# Patient Record
Sex: MALE | Race: WHITE | NOT HISPANIC OR LATINO | Employment: FULL TIME | URBAN - NONMETROPOLITAN AREA
[De-identification: names, ages, dates, MRNs, and addresses within clinical notes are randomized per-mention and may not be internally consistent; named-entity substitution may affect disease eponyms.]

---

## 2018-10-11 ENCOUNTER — HOSPITAL ENCOUNTER (OUTPATIENT)
Facility: HOSPITAL | Age: 43
Discharge: HOME | End: 2018-10-12
Attending: EMERGENCY MEDICINE | Admitting: UROLOGY

## 2018-10-11 ENCOUNTER — APPOINTMENT (OUTPATIENT)
Dept: CT IMAGING | Facility: HOSPITAL | Age: 43
End: 2018-10-11

## 2018-10-11 DIAGNOSIS — N20.1 RIGHT URETERAL STONE: Primary | ICD-10-CM

## 2018-10-11 DIAGNOSIS — N23 RENAL COLIC ON RIGHT SIDE: ICD-10-CM

## 2018-10-11 PROBLEM — R31.9 HEMATURIA: Status: ACTIVE | Noted: 2018-10-11

## 2018-10-11 LAB
ALBUMIN SERPL-MCNC: 4.5 G/DL (ref 3.5–5)
ALBUMIN/GLOB SERPL: 1.5 G/DL (ref 1.5–2.5)
ALP SERPL-CCNC: 76 U/L (ref 40–129)
ALT SERPL W P-5'-P-CCNC: 38 U/L (ref 10–44)
ANION GAP SERPL CALCULATED.3IONS-SCNC: 7.9 MMOL/L (ref 3.6–11.2)
AST SERPL-CCNC: 30 U/L (ref 10–34)
BACTERIA UR QL AUTO: ABNORMAL /HPF
BACTERIA UR QL AUTO: ABNORMAL /HPF
BASOPHILS # BLD AUTO: 0.05 10*3/MM3 (ref 0–0.3)
BASOPHILS NFR BLD AUTO: 0.6 % (ref 0–2)
BILIRUB SERPL-MCNC: 1.5 MG/DL (ref 0.2–1.8)
BILIRUB UR QL STRIP: ABNORMAL
BILIRUB UR QL STRIP: NEGATIVE
BUN BLD-MCNC: 16 MG/DL (ref 7–21)
BUN/CREAT SERPL: 16.5 (ref 7–25)
CALCIUM SPEC-SCNC: 9.2 MG/DL (ref 7.7–10)
CHLORIDE SERPL-SCNC: 106 MMOL/L (ref 99–112)
CLARITY UR: ABNORMAL
CLARITY UR: CLEAR
CO2 SERPL-SCNC: 27.1 MMOL/L (ref 24.3–31.9)
COLOR UR: ABNORMAL
COLOR UR: YELLOW
CREAT BLD-MCNC: 0.97 MG/DL (ref 0.43–1.29)
CRP SERPL-MCNC: <0.5 MG/DL (ref 0–0.99)
CRP SERPL-MCNC: <0.5 MG/DL (ref 0–0.99)
DEPRECATED RDW RBC AUTO: 38.8 FL (ref 37–54)
EOSINOPHIL # BLD AUTO: 0.2 10*3/MM3 (ref 0–0.7)
EOSINOPHIL NFR BLD AUTO: 2.4 % (ref 0–5)
ERYTHROCYTE [DISTWIDTH] IN BLOOD BY AUTOMATED COUNT: 12.7 % (ref 11.5–14.5)
GFR SERPL CREATININE-BSD FRML MDRD: 85 ML/MIN/1.73
GLOBULIN UR ELPH-MCNC: 3.1 GM/DL
GLUCOSE BLD-MCNC: 98 MG/DL (ref 70–110)
GLUCOSE UR STRIP-MCNC: NEGATIVE MG/DL
GLUCOSE UR STRIP-MCNC: NEGATIVE MG/DL
HCT VFR BLD AUTO: 44.8 % (ref 42–52)
HGB BLD-MCNC: 15.9 G/DL (ref 14–18)
HGB UR QL STRIP.AUTO: ABNORMAL
HGB UR QL STRIP.AUTO: ABNORMAL
HOLD SPECIMEN: NORMAL
HYALINE CASTS UR QL AUTO: ABNORMAL /LPF
HYALINE CASTS UR QL AUTO: ABNORMAL /LPF
IMM GRANULOCYTES # BLD: 0.04 10*3/MM3 (ref 0–0.03)
IMM GRANULOCYTES NFR BLD: 0.5 % (ref 0–0.5)
KETONES UR QL STRIP: ABNORMAL
KETONES UR QL STRIP: NEGATIVE
LEUKOCYTE ESTERASE UR QL STRIP.AUTO: ABNORMAL
LEUKOCYTE ESTERASE UR QL STRIP.AUTO: NEGATIVE
LYMPHOCYTES # BLD AUTO: 3.21 10*3/MM3 (ref 1–3)
LYMPHOCYTES NFR BLD AUTO: 38.4 % (ref 21–51)
MCH RBC QN AUTO: 29.8 PG (ref 27–33)
MCHC RBC AUTO-ENTMCNC: 35.5 G/DL (ref 33–37)
MCV RBC AUTO: 83.9 FL (ref 80–94)
MONOCYTES # BLD AUTO: 0.72 10*3/MM3 (ref 0.1–0.9)
MONOCYTES NFR BLD AUTO: 8.6 % (ref 0–10)
MUCOUS THREADS URNS QL MICRO: ABNORMAL /HPF
NEUTROPHILS # BLD AUTO: 4.13 10*3/MM3 (ref 1.4–6.5)
NEUTROPHILS NFR BLD AUTO: 49.5 % (ref 30–70)
NITRITE UR QL STRIP: NEGATIVE
NITRITE UR QL STRIP: NEGATIVE
OSMOLALITY SERPL CALC.SUM OF ELEC: 282.4 MOSM/KG (ref 273–305)
PH UR STRIP.AUTO: 6 [PH] (ref 5–8)
PH UR STRIP.AUTO: 6 [PH] (ref 5–8)
PLATELET # BLD AUTO: 356 10*3/MM3 (ref 130–400)
PMV BLD AUTO: 10.2 FL (ref 6–10)
POTASSIUM BLD-SCNC: 4.2 MMOL/L (ref 3.5–5.3)
PROT SERPL-MCNC: 7.6 G/DL (ref 6–8)
PROT UR QL STRIP: ABNORMAL
PROT UR QL STRIP: NEGATIVE
RBC # BLD AUTO: 5.34 10*6/MM3 (ref 4.7–6.1)
RBC # UR: ABNORMAL /HPF
RBC # UR: ABNORMAL /HPF
REF LAB TEST METHOD: ABNORMAL
REF LAB TEST METHOD: ABNORMAL
SODIUM BLD-SCNC: 141 MMOL/L (ref 135–153)
SP GR UR STRIP: 1.01 (ref 1–1.03)
SP GR UR STRIP: 1.02 (ref 1–1.03)
SQUAMOUS #/AREA URNS HPF: ABNORMAL /HPF
SQUAMOUS #/AREA URNS HPF: ABNORMAL /HPF
UROBILINOGEN UR QL STRIP: ABNORMAL
UROBILINOGEN UR QL STRIP: ABNORMAL
WBC NRBC COR # BLD: 8.35 10*3/MM3 (ref 4.5–12.5)
WBC UR QL AUTO: ABNORMAL /HPF
WBC UR QL AUTO: ABNORMAL /HPF
WHOLE BLOOD HOLD SPECIMEN: NORMAL

## 2018-10-11 PROCEDURE — G0378 HOSPITAL OBSERVATION PER HR: HCPCS

## 2018-10-11 PROCEDURE — 81001 URINALYSIS AUTO W/SCOPE: CPT | Performed by: PHYSICIAN ASSISTANT

## 2018-10-11 PROCEDURE — 25010000002 MORPHINE PER 10 MG: Performed by: INTERNAL MEDICINE

## 2018-10-11 PROCEDURE — 80053 COMPREHEN METABOLIC PANEL: CPT | Performed by: PHYSICIAN ASSISTANT

## 2018-10-11 PROCEDURE — 25010000002 KETOROLAC TROMETHAMINE PER 15 MG: Performed by: INTERNAL MEDICINE

## 2018-10-11 PROCEDURE — 74176 CT ABD & PELVIS W/O CONTRAST: CPT

## 2018-10-11 PROCEDURE — 81001 URINALYSIS AUTO W/SCOPE: CPT | Performed by: NURSE PRACTITIONER

## 2018-10-11 PROCEDURE — 96361 HYDRATE IV INFUSION ADD-ON: CPT

## 2018-10-11 PROCEDURE — 86140 C-REACTIVE PROTEIN: CPT | Performed by: NURSE PRACTITIONER

## 2018-10-11 PROCEDURE — 25010000002 PROMETHAZINE PER 50 MG: Performed by: PHYSICIAN ASSISTANT

## 2018-10-11 PROCEDURE — 25010000002 MORPHINE PER 10 MG

## 2018-10-11 PROCEDURE — 96376 TX/PRO/DX INJ SAME DRUG ADON: CPT

## 2018-10-11 PROCEDURE — 25010000002 HYDROMORPHONE 1 MG/ML SOLUTION: Performed by: EMERGENCY MEDICINE

## 2018-10-11 PROCEDURE — 85025 COMPLETE CBC W/AUTO DIFF WBC: CPT | Performed by: PHYSICIAN ASSISTANT

## 2018-10-11 PROCEDURE — 74176 CT ABD & PELVIS W/O CONTRAST: CPT | Performed by: RADIOLOGY

## 2018-10-11 PROCEDURE — 96374 THER/PROPH/DIAG INJ IV PUSH: CPT

## 2018-10-11 PROCEDURE — 25010000002 ONDANSETRON PER 1 MG: Performed by: PHYSICIAN ASSISTANT

## 2018-10-11 PROCEDURE — 25010000002 ONDANSETRON PER 1 MG: Performed by: INTERNAL MEDICINE

## 2018-10-11 PROCEDURE — 25010000002 KETOROLAC TROMETHAMINE PER 15 MG: Performed by: PHYSICIAN ASSISTANT

## 2018-10-11 PROCEDURE — 96375 TX/PRO/DX INJ NEW DRUG ADDON: CPT

## 2018-10-11 PROCEDURE — 86140 C-REACTIVE PROTEIN: CPT | Performed by: INTERNAL MEDICINE

## 2018-10-11 PROCEDURE — 99284 EMERGENCY DEPT VISIT MOD MDM: CPT

## 2018-10-11 RX ORDER — MULTIVITAMIN
1 TABLET ORAL DAILY
Status: DISCONTINUED | OUTPATIENT
Start: 2018-10-12 | End: 2018-10-12 | Stop reason: HOSPADM

## 2018-10-11 RX ORDER — KETOROLAC TROMETHAMINE 30 MG/ML
30 INJECTION, SOLUTION INTRAMUSCULAR; INTRAVENOUS EVERY 6 HOURS PRN
Status: DISCONTINUED | OUTPATIENT
Start: 2018-10-11 | End: 2018-10-12 | Stop reason: HOSPADM

## 2018-10-11 RX ORDER — MORPHINE SULFATE 2 MG/ML
4 INJECTION, SOLUTION INTRAMUSCULAR; INTRAVENOUS ONCE
Status: COMPLETED | OUTPATIENT
Start: 2018-10-11 | End: 2018-10-11

## 2018-10-11 RX ORDER — ATENOLOL 50 MG/1
50 TABLET ORAL DAILY
Status: DISCONTINUED | OUTPATIENT
Start: 2018-10-12 | End: 2018-10-12 | Stop reason: HOSPADM

## 2018-10-11 RX ORDER — SODIUM CHLORIDE 9 MG/ML
INJECTION, SOLUTION INTRAVENOUS
Status: DISPENSED
Start: 2018-10-11 | End: 2018-10-11

## 2018-10-11 RX ORDER — SODIUM CHLORIDE 0.9 % (FLUSH) 0.9 %
3 SYRINGE (ML) INJECTION EVERY 12 HOURS SCHEDULED
Status: DISCONTINUED | OUTPATIENT
Start: 2018-10-11 | End: 2018-10-12 | Stop reason: HOSPADM

## 2018-10-11 RX ORDER — NALOXONE HCL 0.4 MG/ML
0.4 VIAL (ML) INJECTION
Status: DISCONTINUED | OUTPATIENT
Start: 2018-10-11 | End: 2018-10-11

## 2018-10-11 RX ORDER — MORPHINE SULFATE 2 MG/ML
INJECTION, SOLUTION INTRAMUSCULAR; INTRAVENOUS
Status: COMPLETED
Start: 2018-10-11 | End: 2018-10-11

## 2018-10-11 RX ORDER — MULTIVITAMIN
1 TABLET ORAL DAILY
Status: CANCELLED | OUTPATIENT
Start: 2018-10-11

## 2018-10-11 RX ORDER — SODIUM CHLORIDE 0.9 % (FLUSH) 0.9 %
10 SYRINGE (ML) INJECTION AS NEEDED
Status: DISCONTINUED | OUTPATIENT
Start: 2018-10-11 | End: 2018-10-12 | Stop reason: HOSPADM

## 2018-10-11 RX ORDER — ATENOLOL 50 MG/1
50 TABLET ORAL DAILY
Status: CANCELLED | OUTPATIENT
Start: 2018-10-11

## 2018-10-11 RX ORDER — KETOROLAC TROMETHAMINE 30 MG/ML
30 INJECTION, SOLUTION INTRAMUSCULAR; INTRAVENOUS ONCE
Status: COMPLETED | OUTPATIENT
Start: 2018-10-11 | End: 2018-10-11

## 2018-10-11 RX ORDER — PROMETHAZINE HYDROCHLORIDE 25 MG/ML
12.5 INJECTION, SOLUTION INTRAMUSCULAR; INTRAVENOUS ONCE
Status: COMPLETED | OUTPATIENT
Start: 2018-10-11 | End: 2018-10-11

## 2018-10-11 RX ORDER — ONDANSETRON 2 MG/ML
4 INJECTION INTRAMUSCULAR; INTRAVENOUS ONCE
Status: COMPLETED | OUTPATIENT
Start: 2018-10-11 | End: 2018-10-11

## 2018-10-11 RX ORDER — MORPHINE SULFATE 2 MG/ML
2 INJECTION, SOLUTION INTRAMUSCULAR; INTRAVENOUS EVERY 4 HOURS PRN
Status: DISCONTINUED | OUTPATIENT
Start: 2018-10-11 | End: 2018-10-12 | Stop reason: HOSPADM

## 2018-10-11 RX ORDER — MORPHINE SULFATE 2 MG/ML
1 INJECTION, SOLUTION INTRAMUSCULAR; INTRAVENOUS EVERY 4 HOURS PRN
Status: DISCONTINUED | OUTPATIENT
Start: 2018-10-11 | End: 2018-10-11

## 2018-10-11 RX ORDER — MULTIVITAMIN
1 TABLET ORAL DAILY
COMMUNITY
End: 2022-08-30

## 2018-10-11 RX ORDER — ONDANSETRON 2 MG/ML
4 INJECTION INTRAMUSCULAR; INTRAVENOUS EVERY 6 HOURS PRN
Status: DISCONTINUED | OUTPATIENT
Start: 2018-10-11 | End: 2018-10-12 | Stop reason: HOSPADM

## 2018-10-11 RX ORDER — NALOXONE HCL 0.4 MG/ML
0.4 VIAL (ML) INJECTION
Status: DISCONTINUED | OUTPATIENT
Start: 2018-10-11 | End: 2018-10-12 | Stop reason: HOSPADM

## 2018-10-11 RX ORDER — ACETAMINOPHEN 325 MG/1
650 TABLET ORAL EVERY 4 HOURS PRN
Status: DISCONTINUED | OUTPATIENT
Start: 2018-10-11 | End: 2018-10-12 | Stop reason: HOSPADM

## 2018-10-11 RX ORDER — SODIUM CHLORIDE 0.9 % (FLUSH) 0.9 %
3-10 SYRINGE (ML) INJECTION AS NEEDED
Status: DISCONTINUED | OUTPATIENT
Start: 2018-10-11 | End: 2018-10-12 | Stop reason: HOSPADM

## 2018-10-11 RX ADMIN — HYDROMORPHONE HYDROCHLORIDE 1 MG: 1 INJECTION, SOLUTION INTRAMUSCULAR; INTRAVENOUS; SUBCUTANEOUS at 07:19

## 2018-10-11 RX ADMIN — ONDANSETRON 4 MG: 2 INJECTION, SOLUTION INTRAMUSCULAR; INTRAVENOUS at 06:22

## 2018-10-11 RX ADMIN — MORPHINE SULFATE 1 MG: 2 INJECTION, SOLUTION INTRAMUSCULAR; INTRAVENOUS at 18:39

## 2018-10-11 RX ADMIN — KETOROLAC TROMETHAMINE 30 MG: 30 INJECTION, SOLUTION INTRAMUSCULAR; INTRAVENOUS at 23:33

## 2018-10-11 RX ADMIN — MORPHINE SULFATE 4 MG: 2 INJECTION, SOLUTION INTRAMUSCULAR; INTRAVENOUS at 06:34

## 2018-10-11 RX ADMIN — MORPHINE SULFATE 2 MG: 2 INJECTION, SOLUTION INTRAMUSCULAR; INTRAVENOUS at 21:15

## 2018-10-11 RX ADMIN — SODIUM CHLORIDE 1000 ML: 9 INJECTION, SOLUTION INTRAVENOUS at 06:22

## 2018-10-11 RX ADMIN — PROMETHAZINE HYDROCHLORIDE 12.5 MG: 25 INJECTION INTRAMUSCULAR; INTRAVENOUS at 08:05

## 2018-10-11 RX ADMIN — Medication 3 ML: at 10:00

## 2018-10-11 RX ADMIN — MORPHINE SULFATE 1 MG: 2 INJECTION, SOLUTION INTRAMUSCULAR; INTRAVENOUS at 12:26

## 2018-10-11 RX ADMIN — HYDROMORPHONE HYDROCHLORIDE 1 MG: 1 INJECTION, SOLUTION INTRAMUSCULAR; INTRAVENOUS; SUBCUTANEOUS at 08:00

## 2018-10-11 RX ADMIN — ONDANSETRON 4 MG: 2 INJECTION INTRAMUSCULAR; INTRAVENOUS at 23:33

## 2018-10-11 RX ADMIN — KETOROLAC TROMETHAMINE 30 MG: 30 INJECTION, SOLUTION INTRAMUSCULAR; INTRAVENOUS at 16:20

## 2018-10-11 RX ADMIN — KETOROLAC TROMETHAMINE 30 MG: 30 INJECTION, SOLUTION INTRAMUSCULAR; INTRAVENOUS at 06:20

## 2018-10-11 NOTE — PLAN OF CARE
Problem: Patient Care Overview  Goal: Plan of Care Review  Outcome: Ongoing (interventions implemented as appropriate)      Problem: Pain, Acute (Adult)  Goal: Identify Related Risk Factors and Signs and Symptoms  Outcome: Ongoing (interventions implemented as appropriate)

## 2018-10-11 NOTE — H&P
HISTORY AND PHYSICAL    Patient Identification:  Name:  Francois Griffin  Age:  43 y.o.  Sex:  male  :  1975  MRN:  [id number]   Visit Number:  [id number]  Room number:  214/2S  Primary Care Physician:  Nanette Shepard APRN     Chief complaint:    Chief Complaint   Patient presents with   • Flank Pain       History of presenting illness:  43 y.o. male with history of hyperlipidemia, hypertension, and kidney stones. He presented to the ED with Moderate, sharp, right flank pain that continued to worsen especially with movement. He denies dysuria or hematuria. No fever or chills. CT of there abdomen and pelvis  Reports mild right hydronephrosis secondary to 5 mm proximal right ureteral stone just beyond the UPJ, Mild sigmoid diverticulosis without diverticulitis. Urine collected in the ED positive for RBC's, minimal WBC's. White count is normal. Will consult urology for evaluation of stone and hematuria. He was admitted to the observation unit of pain management and follow up. Case discussed with Dr. Montalvo.  ---------------------------------------------------------------------------------------------------------------------   Review Of Systems:    Constitutional: no fever, chills and night sweats. No appetite change or unexpected weight change. No fatigue.  Eyes: no eye drainage, itching or redness.  HEENT: no mouth sores, dysphagia or nose bleed.  Respiratory: no for shortness of breath, cough or production of sputum.  Cardiovascular: no chest pain, no palpitations, no orthopnea.  Gastrointestinal: no nausea, vomiting or diarrhea. No abdominal pain, hematemesis or rectal bleeding.  Genitourinary: no dysuria or polyuria.  Hematologic/lymphatic: no lymph node abnormalities, no easy bruising or easy bleeding.  Musculoskeletal: no muscle or joint pain.  Skin: No rash and no itching.  Neurological: no loss of consciousness, no seizure, no headache.  Behavioral/Psych: no depression or suicidal  ideation.  Endocrine: no hot flashes.  Immunologic: negative.    ---------------------------------------------------------------------------------------------------------------------   Past Medical History:   Diagnosis Date   • Hyperlipidemia    • Hypertension    • Kidney stone      Past Surgical History:   Procedure Laterality Date   • APPENDECTOMY     • VOCAL CORD BIOPSY       History reviewed. No pertinent family history.  Social History     Social History   • Marital status:      Social History Main Topics   • Smoking status: Never Smoker   • Alcohol use No   • Drug use: No     Other Topics Concern   • Not on file     ---------------------------------------------------------------------------------------------------------------------   Allergies:  Patient has no known allergies.  ---------------------------------------------------------------------------------------------------------------------   Prior to Admission Medications     Prescriptions Last Dose Informant Patient Reported? Taking?    atenolol (TENORMIN) 50 MG tablet 10/11/2018 Self No Yes    Take 1 tablet by mouth Daily.    multivitamin (DAILY RMABO) tablet tablet 10/11/2018 Self Yes Yes    Take 1 tablet by mouth Daily.    hepatitis A (HAVRIX) 1440 EL U/ML vaccine Not Taking Self No No    Inject 1 mL into the appropriate muscle as directed by prescriber.        ---------------------------------------------------------------------------------------------------------------------   Vital Signs:  Temp:  [96.3 °F (35.7 °C)-97.9 °F (36.6 °C)] 96.3 °F (35.7 °C)  Heart Rate:  [61-72] 61  Resp:  [18-22] 18  BP: (113-149)/() 115/74    Mean Arterial Pressure (Non-Invasive) for the past 24 hrs (Last 3 readings):   Noninvasive MAP (mmHg)   10/11/18 0808 87   10/11/18 0723 94   10/11/18 0607 115     SpO2:  [97 %-100 %] 98 %  on   ;   Device (Oxygen Therapy): room air  Body mass index is 32.07 kg/m².    Wt Readings from Last 3 Encounters:   10/11/18 95.7  kg (210 lb 14.4 oz)               ---------------------------------------------------------------------------------------------------------------------   Physical Exam:  Constitutional:  Well-developed and well-nourished.  No respiratory distress.      HENT:  Head: Normocephalic and atraumatic.  Mouth:  Moist mucous membranes.    Eyes:  Conjunctivae and EOM are normal.  Pupils are equal, round, and reactive to light.  No scleral icterus.  Neck:  Neck supple.  No JVD present.    Cardiovascular:  Normal rate, regular rhythm and normal heart sounds with no murmur.  Pulmonary/Chest:  No respiratory distress, no wheezes, no crackles, with normal breath sounds and good air movement.  Abdominal:  Soft.  Bowel sounds are normal.  No distension and no tenderness.   Musculoskeletal:  No edema, no tenderness, and no deformity.  No red or swollen joints anywhere.    Neurological:  Alert and oriented to person, place, and time.  No cranial nerve deficit.  No tongue deviation.  No facial droop.  No slurred speech.   Skin:  Skin is warm and dry.  No rash noted.  No pallor.   Peripheral vascular:  No edema and strong pulses on all 4 extremities.  Genitourinary: right flank pain  ---------------------------------------------------------------------------------------------------------------------      --------------------------------------------------------------------------------------------------------------------    Results from last 7 days  Lab Units 10/11/18  0933 10/11/18  0615   CRP mg/dL <0.50  --    WBC 10*3/mm3  --  8.35   HEMOGLOBIN g/dL  --  15.9   HEMATOCRIT %  --  44.8   MCV fL  --  83.9   MCHC g/dL  --  35.5   PLATELETS 10*3/mm3  --  356     Results from last 7 days  Lab Units 10/11/18  0615   SODIUM mmol/L 141   POTASSIUM mmol/L 4.2   CHLORIDE mmol/L 106   CO2 mmol/L 27.1   BUN mg/dL 16   CREATININE mg/dL 0.97   EGFR IF NONAFRICN AM mL/min/1.73 85   CALCIUM mg/dL 9.2   GLUCOSE mg/dL 98   ALBUMIN g/dL 4.50   BILIRUBIN  mg/dL 1.5   ALK PHOS U/L 76   AST (SGOT) U/L 30   ALT (SGPT) U/L 38   Estimated Creatinine Clearance: 112.4 mL/min (by C-G formula based on SCr of 0.97 mg/dL).    No results found for: HGBA1C, POCGLU  No results found for: AMMONIA      Results from last 7 days  Lab Units 10/11/18  0827   NITRITE UA  Negative   WBC UA /HPF 3-5*   BACTERIA UA /HPF 1+*   SQUAM EPITHEL UA /HPF 0-2             pH No results found for: PHART   pO2 No results found for: PO2ART   pCO2 No results found for: BCA0TWN   HCO3 No results found for: IPC7PCF     I have personally looked at the labs and they are summarized above.  ----------------------------------------------------------------------------------------------------------------------  Imaging Results (last 24 hours)     Procedure Component Value Units Date/Time    CT Abdomen Pelvis Stone Protocol [[id number]] Collected:  10/11/18 0744     Updated:  10/11/18 0748    Narrative:       EXAM: CT ABDOMEN PELVIS STONE PROTOCOL-              TECHNIQUE: Multiple axial CT images were obtained from lung bases  through pubic symphysis WITHOUT administration of IV contrast.  Reformatted images in the coronal and/or sagittal plane(s) were  generated from the axial data set to facilitate diagnostic accuracy  and/or surgical planning.  Oral Contrast:NONE.     Radiation dose reduction techniques were utilized per ALARA protocol.  Automated exposure control was initiated through either or [employer] or  [employer] software packages by  protocol.       DOSE: 1051.52 mGy.cm     Clinical information  Flank pain, stone disease suspected      Comparison  NONE.     Findings  LOWER THORAX: Clear. No effusions.     ABDOMEN:        LIVER: Homogeneous. No focal hepatic mass or ductal dilatation.        GALLBLADDER: CHOLECYSTECTOMY.        PANCREAS: Unremarkable. No mass or ductal dilatation.        SPLEEN: Homogeneous. No splenomegaly.        ADRENALS: No mass.        KIDNEYS/URETERS: MILD RIGHT-SIDED  HYDRONEPHROSIS SECONDARY TO 5 MM  PROXIMAL RIGHT URETERAL STONE. BILATERAL NONOBSTRUCTING KIDNEY STONES.        GI TRACT: SMALL SLIDING HIATAL HERNIA. MILD SIGMOID DIVERTICULOSIS  WITHOUT EVIDENCE OF DIVERTICULITIS. NO BOWEL OBSTRUCTION.        PERITONEUM: No free air. No free fluid or loculated fluid  collections.        MESENTERY: Unremarkable.        LYMPH NODES: No lymphadenopathy.        VASCULATURE: No evidence of aneurysm.        ABDOMINAL WALL: No focal hernia or mass.           OTHER: None.     PELVIS:        BLADDER: No focal mass or significant wall thickening        REPRODUCTIVE: Unremarkable as visualized.        APPENDIX: APPENDECTOMY.     BONES: DEGENERATIVE CHANGES WITH NEURAL FORAMINAL STENOSIS L5/S1. NO  ACUTE BONY FINDINGS.       Impression:       Impression:  1. MILD RIGHT HYDRONEPHROSIS SECONDARY TO 5 MM PROXIMAL RIGHT URETERAL  STONE JUST BEYOND UPJ.  2. MILD SIGMOID DIVERTICULOSIS WITHOUT DIVERTICULITIS.  3. OTHER NONACUTE/INCIDENTAL FINDINGS AS ABOVE.     This report was finalized on 10/11/2018 7:46 AM by Dr. Dejuan Levy MD.           I have personally reviewed the radiology images and read the final radiology report.  ----------------------------------------------------------------------------------------------------------------------  Assessment:    1. Right flank pain r/t kidney stone     Plan:      43 y.o. male with history of hyperlipidemia, hypertension, and kidney stones. He presented to the ED with Moderate, sharp, right flank pain that continued to worsen especially with movement. He denies dysuria or hematuria. No fever or chills. CT of there abdomen and pelvis  Reports mild right hydronephrosis secondary to 5 mm proximal right ureteral stone just beyond the UPJ, Mild sigmoid diverticulosis without diverticulitis. Urine collected in the ED positive for RBC's, minimal WBC's. White count is normal. Will consult urology for evaluation of stone and hematuria. He was admitted to the  observation unit of pain management and follow up. Case discussed with Dr. Montalvo.    Will continue to follow closely.      YESSENIA Elise  10/11/18  11:11 AM

## 2018-10-11 NOTE — ED PROVIDER NOTES
Subjective     Abdominal Pain   Pain location:  R flank  Pain quality: sharp    Pain radiates to:  Back  Pain severity:  Moderate  Onset quality:  Sudden  Duration:  2 hours  Timing:  Constant  Progression:  Worsening  Chronicity:  New  Context: awakening from sleep    Context: not alcohol use, not diet changes, not eating, not laxative use, not medication withdrawal, not previous surgeries, not recent illness, not recent sexual activity, not recent travel, not retching, not sick contacts, not suspicious food intake and not trauma    Relieved by:  None tried  Worsened by:  Position changes, palpation, movement and urination  Ineffective treatments:  None tried  Associated symptoms: nausea and vomiting    Associated symptoms: no anorexia, no belching, no chest pain, no chills, no constipation, no cough, no diarrhea, no dysuria, no fatigue, no fever, no flatus, no hematemesis, no hematochezia, no hematuria, no melena, no shortness of breath, no sore throat, no vaginal bleeding and no vaginal discharge    Risk factors: obesity    Risk factors: no alcohol abuse, no aspirin use, not elderly, has not had multiple surgeries, no NSAID use, not pregnant and no recent hospitalization    Risk factors comment:  History of kidney stone 1 year ago that passed on its own.      Review of Systems   Constitutional: Negative.  Negative for chills, fatigue and fever.   HENT: Negative.  Negative for sore throat.    Respiratory: Negative.  Negative for cough and shortness of breath.    Cardiovascular: Negative.  Negative for chest pain.   Gastrointestinal: Positive for abdominal pain, nausea and vomiting. Negative for abdominal distention, anal bleeding, anorexia, blood in stool, constipation, diarrhea, flatus, hematemesis, hematochezia, melena and rectal pain.   Endocrine: Negative.    Genitourinary: Positive for decreased urine volume. Negative for difficulty urinating, discharge, dysuria, enuresis, flank pain, frequency, genital  sores, hematuria, penile pain, penile swelling, scrotal swelling, testicular pain, urgency, vaginal bleeding and vaginal discharge.   Skin: Negative.    Neurological: Negative.    Psychiatric/Behavioral: Negative.    All other systems reviewed and are negative.      Past Medical History:   Diagnosis Date   • Hyperlipidemia    • Hypertension    • Kidney stone        No Known Allergies    Past Surgical History:   Procedure Laterality Date   • APPENDECTOMY     • VOCAL CORD BIOPSY         History reviewed. No pertinent family history.    Social History     Social History   • Marital status:      Social History Main Topics   • Smoking status: Never Smoker   • Alcohol use No   • Drug use: No     Other Topics Concern   • Not on file           Objective   Physical Exam   Constitutional: He is oriented to person, place, and time. He appears well-developed and well-nourished. No distress.   HENT:   Head: Normocephalic and atraumatic.   Right Ear: External ear normal.   Left Ear: External ear normal.   Nose: Nose normal.   Eyes: Pupils are equal, round, and reactive to light. Conjunctivae and EOM are normal.   Neck: Normal range of motion. Neck supple. No JVD present. No tracheal deviation present.   Cardiovascular: Normal rate, regular rhythm and normal heart sounds.  Exam reveals no gallop and no friction rub.    No murmur heard.  Pulmonary/Chest: Effort normal and breath sounds normal. No respiratory distress. He has no wheezes. He has no rales. He exhibits no tenderness.   Abdominal: Soft. Bowel sounds are normal. He exhibits no distension and no mass. There is tenderness. There is no rebound and no guarding. No hernia.   Tenderness to palpation in the right flank    Musculoskeletal: Normal range of motion. He exhibits no edema or deformity.   Neurological: He is alert and oriented to person, place, and time. No cranial nerve deficit.   Skin: Skin is warm and dry. No rash noted. He is not diaphoretic. No erythema.  No pallor.   Psychiatric: He has a normal mood and affect. His behavior is normal. Thought content normal.   Nursing note and vitals reviewed.      Procedures           ED Course  ED Course as of Oct 11 0801   Thu Oct 11, 2018   [id number] Spoke with Anisha in the observation unit and she has accepted him for admission under Dr. Montalvo.   [MM]      ED Course User Index  [MM] Navya Howell PA                  MDM  Number of Diagnoses or Management Options  Renal colic on right side:   Right ureteral stone:      Amount and/or Complexity of Data Reviewed  Clinical lab tests: ordered and reviewed  Tests in the radiology section of CPT®: ordered and reviewed  Discuss the patient with other providers: yes          Final diagnoses:   Right ureteral stone   Renal colic on right side            Navya Howell PA  10/11/18 0801

## 2018-10-12 ENCOUNTER — ANESTHESIA EVENT (OUTPATIENT)
Dept: PERIOP | Facility: HOSPITAL | Age: 43
End: 2018-10-12

## 2018-10-12 ENCOUNTER — APPOINTMENT (OUTPATIENT)
Dept: GENERAL RADIOLOGY | Facility: HOSPITAL | Age: 43
End: 2018-10-12

## 2018-10-12 ENCOUNTER — ANESTHESIA (OUTPATIENT)
Dept: PERIOP | Facility: HOSPITAL | Age: 43
End: 2018-10-12

## 2018-10-12 VITALS
SYSTOLIC BLOOD PRESSURE: 101 MMHG | RESPIRATION RATE: 18 BRPM | WEIGHT: 210.98 LBS | HEIGHT: 68 IN | BODY MASS INDEX: 31.98 KG/M2 | HEART RATE: 53 BPM | TEMPERATURE: 96.8 F | OXYGEN SATURATION: 96 % | DIASTOLIC BLOOD PRESSURE: 65 MMHG

## 2018-10-12 LAB
ANION GAP SERPL CALCULATED.3IONS-SCNC: 9 MMOL/L (ref 3.6–11.2)
BASOPHILS # BLD AUTO: 0.02 10*3/MM3 (ref 0–0.3)
BASOPHILS NFR BLD AUTO: 0.2 % (ref 0–2)
BUN BLD-MCNC: 15 MG/DL (ref 7–21)
BUN/CREAT SERPL: 13.6 (ref 7–25)
CALCIUM SPEC-SCNC: 8.9 MG/DL (ref 7.7–10)
CHLORIDE SERPL-SCNC: 112 MMOL/L (ref 99–112)
CO2 SERPL-SCNC: 20 MMOL/L (ref 24.3–31.9)
CREAT BLD-MCNC: 1.1 MG/DL (ref 0.43–1.29)
DEPRECATED RDW RBC AUTO: 39.8 FL (ref 37–54)
EOSINOPHIL # BLD AUTO: 0.16 10*3/MM3 (ref 0–0.7)
EOSINOPHIL NFR BLD AUTO: 1.8 % (ref 0–5)
ERYTHROCYTE [DISTWIDTH] IN BLOOD BY AUTOMATED COUNT: 12.9 % (ref 11.5–14.5)
GFR SERPL CREATININE-BSD FRML MDRD: 74 ML/MIN/1.73
GLUCOSE BLD-MCNC: 97 MG/DL (ref 70–110)
HCT VFR BLD AUTO: 43.9 % (ref 42–52)
HGB BLD-MCNC: 15.2 G/DL (ref 14–18)
IMM GRANULOCYTES # BLD: 0.01 10*3/MM3 (ref 0–0.03)
IMM GRANULOCYTES NFR BLD: 0.1 % (ref 0–0.5)
LYMPHOCYTES # BLD AUTO: 1.39 10*3/MM3 (ref 1–3)
LYMPHOCYTES NFR BLD AUTO: 16 % (ref 21–51)
MCH RBC QN AUTO: 29.3 PG (ref 27–33)
MCHC RBC AUTO-ENTMCNC: 34.6 G/DL (ref 33–37)
MCV RBC AUTO: 84.7 FL (ref 80–94)
MONOCYTES # BLD AUTO: 0.61 10*3/MM3 (ref 0.1–0.9)
MONOCYTES NFR BLD AUTO: 7 % (ref 0–10)
NEUTROPHILS # BLD AUTO: 6.49 10*3/MM3 (ref 1.4–6.5)
NEUTROPHILS NFR BLD AUTO: 74.9 % (ref 30–70)
OSMOLALITY SERPL CALC.SUM OF ELEC: 282 MOSM/KG (ref 273–305)
PLATELET # BLD AUTO: 281 10*3/MM3 (ref 130–400)
PMV BLD AUTO: 10.1 FL (ref 6–10)
POTASSIUM BLD-SCNC: 4.7 MMOL/L (ref 3.5–5.3)
RBC # BLD AUTO: 5.18 10*6/MM3 (ref 4.7–6.1)
SODIUM BLD-SCNC: 141 MMOL/L (ref 135–153)
WBC NRBC COR # BLD: 8.68 10*3/MM3 (ref 4.5–12.5)

## 2018-10-12 PROCEDURE — 25010000002 MORPHINE PER 10 MG: Performed by: NURSE PRACTITIONER

## 2018-10-12 PROCEDURE — 74018 RADEX ABDOMEN 1 VIEW: CPT | Performed by: RADIOLOGY

## 2018-10-12 PROCEDURE — 25010000002 FENTANYL CITRATE (PF) 100 MCG/2ML SOLUTION: Performed by: NURSE ANESTHETIST, CERTIFIED REGISTERED

## 2018-10-12 PROCEDURE — 25010000002 HYDROMORPHONE 1 MG/ML SOLUTION

## 2018-10-12 PROCEDURE — 25010000002 MIDAZOLAM PER 1 MG: Performed by: NURSE ANESTHETIST, CERTIFIED REGISTERED

## 2018-10-12 PROCEDURE — G0378 HOSPITAL OBSERVATION PER HR: HCPCS

## 2018-10-12 PROCEDURE — 96376 TX/PRO/DX INJ SAME DRUG ADON: CPT

## 2018-10-12 PROCEDURE — 50590 FRAGMENTING OF KIDNEY STONE: CPT | Performed by: UROLOGY

## 2018-10-12 PROCEDURE — 25010000002 ONDANSETRON PER 1 MG: Performed by: NURSE ANESTHETIST, CERTIFIED REGISTERED

## 2018-10-12 PROCEDURE — 25010000003 CEFAZOLIN PER 500 MG: Performed by: UROLOGY

## 2018-10-12 PROCEDURE — 85025 COMPLETE CBC W/AUTO DIFF WBC: CPT | Performed by: INTERNAL MEDICINE

## 2018-10-12 PROCEDURE — 25010000002 HYDROMORPHONE PER 4 MG: Performed by: ANESTHESIOLOGY

## 2018-10-12 PROCEDURE — 25010000002 PROPOFOL 10 MG/ML EMULSION: Performed by: NURSE ANESTHETIST, CERTIFIED REGISTERED

## 2018-10-12 PROCEDURE — 80048 BASIC METABOLIC PNL TOTAL CA: CPT | Performed by: INTERNAL MEDICINE

## 2018-10-12 PROCEDURE — 25010000002 DEXAMETHASONE PER 1 MG: Performed by: NURSE ANESTHETIST, CERTIFIED REGISTERED

## 2018-10-12 PROCEDURE — 74018 RADEX ABDOMEN 1 VIEW: CPT

## 2018-10-12 RX ORDER — HYDROMORPHONE HCL 110MG/55ML
PATIENT CONTROLLED ANALGESIA SYRINGE INTRAVENOUS AS NEEDED
Status: DISCONTINUED | OUTPATIENT
Start: 2018-10-12 | End: 2018-10-12 | Stop reason: SURG

## 2018-10-12 RX ORDER — HYDROMORPHONE HYDROCHLORIDE 1 MG/ML
0.5 INJECTION, SOLUTION INTRAMUSCULAR; INTRAVENOUS; SUBCUTANEOUS
Status: DISCONTINUED | OUTPATIENT
Start: 2018-10-12 | End: 2018-10-12 | Stop reason: HOSPADM

## 2018-10-12 RX ORDER — SODIUM CHLORIDE, SODIUM LACTATE, POTASSIUM CHLORIDE, CALCIUM CHLORIDE 600; 310; 30; 20 MG/100ML; MG/100ML; MG/100ML; MG/100ML
125 INJECTION, SOLUTION INTRAVENOUS CONTINUOUS
Status: DISCONTINUED | OUTPATIENT
Start: 2018-10-12 | End: 2018-10-12 | Stop reason: HOSPADM

## 2018-10-12 RX ORDER — SODIUM CHLORIDE 0.9 % (FLUSH) 0.9 %
3-10 SYRINGE (ML) INJECTION AS NEEDED
Status: DISCONTINUED | OUTPATIENT
Start: 2018-10-12 | End: 2018-10-12 | Stop reason: HOSPADM

## 2018-10-12 RX ORDER — HYDROMORPHONE HYDROCHLORIDE 1 MG/ML
0.5 INJECTION, SOLUTION INTRAMUSCULAR; INTRAVENOUS; SUBCUTANEOUS ONCE
Status: COMPLETED | OUTPATIENT
Start: 2018-10-12 | End: 2018-10-12

## 2018-10-12 RX ORDER — LIDOCAINE HYDROCHLORIDE 20 MG/ML
INJECTION, SOLUTION EPIDURAL; INFILTRATION; INTRACAUDAL; PERINEURAL AS NEEDED
Status: DISCONTINUED | OUTPATIENT
Start: 2018-10-12 | End: 2018-10-12 | Stop reason: SURG

## 2018-10-12 RX ORDER — MEPERIDINE HYDROCHLORIDE 25 MG/ML
12.5 INJECTION INTRAMUSCULAR; INTRAVENOUS; SUBCUTANEOUS
Status: DISCONTINUED | OUTPATIENT
Start: 2018-10-12 | End: 2018-10-12 | Stop reason: HOSPADM

## 2018-10-12 RX ORDER — ONDANSETRON 2 MG/ML
INJECTION INTRAMUSCULAR; INTRAVENOUS AS NEEDED
Status: DISCONTINUED | OUTPATIENT
Start: 2018-10-12 | End: 2018-10-12 | Stop reason: SURG

## 2018-10-12 RX ORDER — FENTANYL CITRATE 50 UG/ML
50 INJECTION, SOLUTION INTRAMUSCULAR; INTRAVENOUS
Status: DISCONTINUED | OUTPATIENT
Start: 2018-10-12 | End: 2018-10-12 | Stop reason: HOSPADM

## 2018-10-12 RX ORDER — PROPOFOL 10 MG/ML
VIAL (ML) INTRAVENOUS AS NEEDED
Status: DISCONTINUED | OUTPATIENT
Start: 2018-10-12 | End: 2018-10-12 | Stop reason: SURG

## 2018-10-12 RX ORDER — SODIUM CHLORIDE 0.9 % (FLUSH) 0.9 %
3 SYRINGE (ML) INJECTION EVERY 12 HOURS SCHEDULED
Status: DISCONTINUED | OUTPATIENT
Start: 2018-10-12 | End: 2018-10-12 | Stop reason: HOSPADM

## 2018-10-12 RX ORDER — ONDANSETRON 2 MG/ML
4 INJECTION INTRAMUSCULAR; INTRAVENOUS ONCE AS NEEDED
Status: DISCONTINUED | OUTPATIENT
Start: 2018-10-12 | End: 2018-10-12 | Stop reason: HOSPADM

## 2018-10-12 RX ORDER — OXYCODONE HYDROCHLORIDE AND ACETAMINOPHEN 5; 325 MG/1; MG/1
1 TABLET ORAL ONCE AS NEEDED
Status: DISCONTINUED | OUTPATIENT
Start: 2018-10-12 | End: 2018-10-12 | Stop reason: HOSPADM

## 2018-10-12 RX ORDER — FENTANYL CITRATE 50 UG/ML
INJECTION, SOLUTION INTRAMUSCULAR; INTRAVENOUS AS NEEDED
Status: DISCONTINUED | OUTPATIENT
Start: 2018-10-12 | End: 2018-10-12 | Stop reason: SURG

## 2018-10-12 RX ORDER — MIDAZOLAM HYDROCHLORIDE 1 MG/ML
INJECTION INTRAMUSCULAR; INTRAVENOUS AS NEEDED
Status: DISCONTINUED | OUTPATIENT
Start: 2018-10-12 | End: 2018-10-12 | Stop reason: SURG

## 2018-10-12 RX ORDER — IPRATROPIUM BROMIDE AND ALBUTEROL SULFATE 2.5; .5 MG/3ML; MG/3ML
3 SOLUTION RESPIRATORY (INHALATION) ONCE AS NEEDED
Status: DISCONTINUED | OUTPATIENT
Start: 2018-10-12 | End: 2018-10-12 | Stop reason: HOSPADM

## 2018-10-12 RX ORDER — DEXAMETHASONE SODIUM PHOSPHATE 4 MG/ML
INJECTION, SOLUTION INTRA-ARTICULAR; INTRALESIONAL; INTRAMUSCULAR; INTRAVENOUS; SOFT TISSUE AS NEEDED
Status: DISCONTINUED | OUTPATIENT
Start: 2018-10-12 | End: 2018-10-12 | Stop reason: SURG

## 2018-10-12 RX ORDER — OXYCODONE AND ACETAMINOPHEN 10; 325 MG/1; MG/1
1 TABLET ORAL EVERY 4 HOURS PRN
Qty: 10 TABLET | Refills: 0 | Status: SHIPPED | OUTPATIENT
Start: 2018-10-12 | End: 2018-10-22

## 2018-10-12 RX ADMIN — ATENOLOL 50 MG: 50 TABLET ORAL at 07:45

## 2018-10-12 RX ADMIN — PROPOFOL 180 MG: 10 INJECTION, EMULSION INTRAVENOUS at 11:57

## 2018-10-12 RX ADMIN — MIDAZOLAM HYDROCHLORIDE 2 MG: 1 INJECTION, SOLUTION INTRAMUSCULAR; INTRAVENOUS at 11:53

## 2018-10-12 RX ADMIN — DEXAMETHASONE SODIUM PHOSPHATE 4 MG: 4 INJECTION, SOLUTION INTRAMUSCULAR; INTRAVENOUS at 12:06

## 2018-10-12 RX ADMIN — Medication 1 TABLET: at 07:45

## 2018-10-12 RX ADMIN — FENTANYL CITRATE 50 MCG: 50 INJECTION INTRAMUSCULAR; INTRAVENOUS at 12:35

## 2018-10-12 RX ADMIN — ONDANSETRON 4 MG: 2 INJECTION, SOLUTION INTRAMUSCULAR; INTRAVENOUS at 12:06

## 2018-10-12 RX ADMIN — HYDROMORPHONE HYDROCHLORIDE 0.5 MG: 1 INJECTION, SOLUTION INTRAMUSCULAR; INTRAVENOUS; SUBCUTANEOUS at 05:11

## 2018-10-12 RX ADMIN — MORPHINE SULFATE 2 MG: 2 INJECTION, SOLUTION INTRAMUSCULAR; INTRAVENOUS at 00:47

## 2018-10-12 RX ADMIN — Medication 3 ML: at 11:14

## 2018-10-12 RX ADMIN — SODIUM CHLORIDE, POTASSIUM CHLORIDE, SODIUM LACTATE AND CALCIUM CHLORIDE: 600; 310; 30; 20 INJECTION, SOLUTION INTRAVENOUS at 11:31

## 2018-10-12 RX ADMIN — CEFAZOLIN SODIUM 2 G: 2 SOLUTION INTRAVENOUS at 11:53

## 2018-10-12 RX ADMIN — FENTANYL CITRATE 50 MCG: 50 INJECTION INTRAMUSCULAR; INTRAVENOUS at 11:53

## 2018-10-12 RX ADMIN — HYDROMORPHONE HYDROCHLORIDE 1 MG: 2 INJECTION, SOLUTION INTRAMUSCULAR; INTRAVENOUS; SUBCUTANEOUS at 11:29

## 2018-10-12 RX ADMIN — FENTANYL CITRATE 50 MCG: 50 INJECTION INTRAMUSCULAR; INTRAVENOUS at 12:29

## 2018-10-12 RX ADMIN — LIDOCAINE HYDROCHLORIDE 3 ML: 20 INJECTION, SOLUTION EPIDURAL; INFILTRATION; INTRACAUDAL; PERINEURAL at 11:55

## 2018-10-12 RX ADMIN — MORPHINE SULFATE 2 MG: 2 INJECTION, SOLUTION INTRAMUSCULAR; INTRAVENOUS at 04:07

## 2018-10-12 RX ADMIN — FENTANYL CITRATE 50 MCG: 50 INJECTION INTRAMUSCULAR; INTRAVENOUS at 12:05

## 2018-10-12 NOTE — PLAN OF CARE
Problem: Patient Care Overview  Goal: Plan of Care Review  Outcome: Ongoing (interventions implemented as appropriate)    Goal: Individualization and Mutuality  Outcome: Ongoing (interventions implemented as appropriate)    Goal: Discharge Needs Assessment  Outcome: Ongoing (interventions implemented as appropriate)    Goal: Interprofessional Rounds/Family Conf  Outcome: Ongoing (interventions implemented as appropriate)      Problem: Pain, Acute (Adult)  Goal: Identify Related Risk Factors and Signs and Symptoms  Outcome: Ongoing (interventions implemented as appropriate)  Pt reports pain is a tolerable level  Goal: Acceptable Pain Control/Comfort Level  Outcome: Ongoing (interventions implemented as appropriate)

## 2018-10-12 NOTE — ANESTHESIA PROCEDURE NOTES
Airway  Urgency: elective    Date/Time: 10/12/2018 11:58 AM    General Information and Staff    Patient location during procedure: OR  CRNA: SHANTELL FLORIAN    Indications and Patient Condition  Indications for airway management: airway protection    Preoxygenated: yes  MILS maintained throughout  Mask difficulty assessment: 0 - not attempted    Final Airway Details  Final airway type: supraglottic airway      Successful airway: unique  Size 4  Airway Seal Pressure (cm H2O): 20    Number of attempts at approach: 1    Additional Comments  Atraumatic

## 2018-10-12 NOTE — PROGRESS NOTES
"  I have personally seen and examined the patient today and discussed overnight interval progress and pertinent issues with nursing staff.    Subjective       He had severe pain overnight. Pain medication was increased. Stated that he felt a \"pop\" and the pain improved after pain medication. He continues to be tender on the right flank, as well as lower abdomin and groin area. He has been afebrile overnight. CRP and white count are normal. UA with significant hematuria. He is NPO this morning for ureteroscopy and possible lithotripsy this afternoon.         History taken from: patient chart RN      Objective       Vital Signs    /76 (BP Location: Left arm, Patient Position: Lying)   Pulse 98   Temp 97 °F (36.1 °C) (Oral)   Resp 18   Ht 172.7 cm (68\")   Wt 95.7 kg (210 lb 14.4 oz)   SpO2 97%   BMI 32.07 kg/m²     Temp:  [96.2 °F (35.7 °C)-98.6 °F (37 °C)] 97 °F (36.1 °C)      Intake/Output Summary (Last 24 hours) at 10/12/18 1018  Last data filed at 10/11/18 1117   Gross per 24 hour   Intake             1000 ml   Output                0 ml   Net             1000 ml     Intake & Output (last 3 days)       10/09 0701 - 10/10 0700 10/10 0701 - 10/11 0700 10/11 0701 - 10/12 0700 10/12 0701 - 10/13 0700    P.O.   1320     Total Intake(mL/kg)   1320 (13.8)     Net     +1320              Unmeasured Urine Occurrence   2 x     Unmeasured Stool Occurrence   1 x           Objective    General Appearance:    Alert, cooperative, in no acute distress   Head:    Normocephalic, without obvious abnormality, atraumatic   Eyes:            Lids and lashes normal, conjunctivae and sclerae normal, no   icterus, no pallor, corneas clear, PERRLA   Ears:    Ears appear intact with no abnormalities noted   Throat:   No oral lesions, no thrush, oral mucosa moist   Neck:   No adenopathy, supple, trachea midline, no thyromegaly, no   carotid bruit, no JVD   Back:     No tenderness to percussion or palpation, range of motion   normal "   Lungs:     Clear to auscultation,respirations regular, even and unlabored. No wheezing, no ronchi and no crackles.    Heart:    Regular rhythm and normal rate, normal S1 and S2, no            murmur, no gallop, no rub, no click   Chest Wall:    No abnormalities observed   Abdomen:    right flank tenderness   Rectal:     Deferred   Extremities:   Moves all extremities well, no edema, no cyanosis, no             redness   Pulses:   Pulses palpable and equal bilaterally   Skin:   No bleeding, bruising or rash   Lymph nodes:   No palpable adenopathy   Neurologic:   Awake, alert and oriented x 3. Following commands.           Results:      Results from last 7 days  Lab Units 10/12/18  0402 10/11/18  0615   WBC 10*3/mm3 8.68 8.35     Lab Results   Component Value Date    NEUTROABS 6.49 10/12/2018         Results from last 7 days  Lab Units 10/12/18  0402   CREATININE mg/dL 1.10         Results from last 7 days  Lab Units 10/11/18  0933 10/11/18  0637   CRP mg/dL <0.50 <0.50       Imaging Results (last 24 hours)     Procedure Component Value Units Date/Time    XR Abdomen KUB [[id number]] Collected:  10/12/18 0829     Updated:  10/12/18 0835    Narrative:       EXAMINATION: XR ABDOMEN KUB-      TECHNIQUE: Single KUB     CLINICAL INDICATION:     stone localization; N20.1-Calculus of ureter;  N23-Unspecified renal colic      COMPARISON:    None available.     FINDINGS:    Left kidney stone. Probably no change in location of previously  described UPJ stone in the region of the proximal right ureter.  Scattered fecal material seen throughout colon.  The bony structures are unremarkable.  No definite radiographic evidence of soft tissue mass.            Impression:       Probably no change in location of proximal right ureteral stone.  Nonobstructing left kidney stone noted.     This report was finalized on 10/12/2018 8:30 AM by Dr. Dejuan Levy MD.               Results Review:    I have personally reviewed laboratory data,  "culture results, radiology studies and antimicrobial therapy.    Hospital Medications (active)       Dose Frequency Start End    acetaminophen (TYLENOL) tablet 650 mg 650 mg Every 4 Hours PRN 10/11/2018     Sig - Route: Take 2 tablets by mouth Every 4 (Four) Hours As Needed for Mild Pain . - Oral    atenolol (TENORMIN) tablet 50 mg 50 mg Daily 10/12/2018     Sig - Route: Take 1 tablet by mouth Daily. - Oral    HYDROmorphone (DILAUDID) injection 0.5 mg 0.5 mg Once 10/12/2018 10/12/2018    Sig - Route: Infuse 0.5 mL into a venous catheter 1 (One) Time. - Intravenous    HYDROmorphone (DILAUDID) injection 0.5 mg 0.5 mg Every 2 Hours PRN 10/12/2018 10/22/2018    Sig - Route: Infuse 0.5 mL into a venous catheter Every 2 (Two) Hours As Needed for Severe Pain . - Intravenous    ketorolac (TORADOL) injection 30 mg 30 mg Every 6 Hours PRN 10/11/2018 10/16/2018    Sig - Route: Infuse 1 mL into a venous catheter Every 6 (Six) Hours As Needed for Moderate Pain  (times two doses). - Intravenous    morphine injection 2 mg 2 mg Every 4 Hours PRN 10/11/2018 10/21/2018    Sig - Route: Infuse 1 mL into a venous catheter Every 4 (Four) Hours As Needed for Moderate Pain . - Intravenous    Cosign for Ordering: Accepted by Ruslan Montalvo MD on 10/12/2018 10:03 AM    Linked Group 1:  \"And\" Linked Group Details        multivitamin (DAILY RAMBO) tablet 1 tablet 1 tablet Daily 10/12/2018     Sig - Route: Take 1 tablet by mouth Daily. - Oral    naloxone (NARCAN) injection 0.4 mg 0.4 mg Every 5 Minutes PRN 10/11/2018     Sig - Route: Infuse 1 mL into a venous catheter Every 5 (Five) Minutes As Needed for Respiratory Depression. - Intravenous    Linked Group 1:  \"And\" Linked Group Details        ondansetron (ZOFRAN) injection 4 mg 4 mg Every 6 Hours PRN 10/11/2018     Sig - Route: Infuse 2 mL into a venous catheter Every 6 (Six) Hours As Needed for Nausea or Vomiting. - Intravenous    sodium chloride 0.9 % flush 10 mL 10 mL As Needed " "10/11/2018     Sig - Route: Infuse 10 mL into a venous catheter As Needed for Line Care. - Intravenous    Cosign for Ordering: Accepted by Kishan Crawford MD on 10/11/2018  7:14 AM    Linked Group 2:  \"And\" Linked Group Details        sodium chloride 0.9 % flush 3 mL 3 mL Every 12 Hours Scheduled 10/11/2018     Sig - Route: Infuse 3 mL into a venous catheter Every 12 (Twelve) Hours. - Intravenous    sodium chloride 0.9 % flush 3-10 mL 3-10 mL As Needed 10/11/2018     Sig - Route: Infuse 3-10 mL into a venous catheter As Needed for Line Care. - Intravenous    morphine injection 1 mg (Discontinued) 1 mg Every 4 Hours PRN 10/11/2018 10/11/2018    Sig - Route: Infuse 0.5 mL into a venous catheter Every 4 (Four) Hours As Needed for Moderate Pain . - Intravenous    Linked Group 1:  \"And\" Linked Group Details        naloxone (NARCAN) injection 0.4 mg (Discontinued) 0.4 mg Every 5 Minutes PRN 10/11/2018 10/11/2018    Sig - Route: Infuse 1 mL into a venous catheter Every 5 (Five) Minutes As Needed for Respiratory Depression. - Intravenous    Linked Group 1:  \"And\" Linked Group Details                           ASSESSMENT/PLAN           Assessment/Plan     ASSESSMENT:    1. Right flank pain r/t kidney stone    PLAN:    He had severe pain overnight. Pain medication was increased. Stated that he felt a \"pop\" and the pain improved after pain medication. He continues to be tender on the right flank, as well as lower abdomin and groin area. He has been afebrile overnight. CRP and white count are normal. UA with significant hematuria. He is NPO this morning for ureteroscopy and possible lithotripsy this afternoon.    Will monitor after surgical procedure for pain control with hopes to discharge later this evening.      Patient's findings and recommendations were discussed with patient, nursing staff, primary care team and consulting provider    Code Status:   Code Status and Medical Interventions:   Ordered at: 10/11/18 0910 "     Level Of Support Discussed With:    Patient     Code Status:    CPR     Medical Interventions (Level of Support Prior to Arrest):    Full       Anisha Neil, APRN  10/12/18  10:18 AM   Physician Attestation:    I have personally seen and examined the patient. I reviewed the patient's data including history of present illness, review of systems, physical examination, assessment and treatment plan and agree with findings above. The assessment and plan are my own.  I have reviewed and edited the note above after discussing the findings with the midlevel.    Ruslan Montalvo MD  Infectious Diseases  10/13/18  9:44 AM

## 2018-10-12 NOTE — NURSING NOTE
Pt currently NPO for procedure this afternoon. I called to outpatient surgery and they have given me the OK to give the pt. His atenolol and multivitamin with a SMALL sip of water.

## 2018-10-12 NOTE — DISCHARGE SUMMARY
[facility] DISCHARGE SUMMARY    Patient Identification:  Name:  Francois Griffin  Age:  43 y.o.  Sex:  male  :  1975  MRN:  [id number]  Visit Number:  [id number]    Date of Admission: 10/11/2018  Date of Discharge:  10/12/2018     PCP: Nanette Shepard, YESSENIA    DISCHARGE DIAGNOSIS    Right ureteral stone/ s/p ESWL  CONSULTS     Urology  PROCEDURES PERFORMED    ESWL  HOSPITAL COURSE   Patient is a 43 y.o. male presented to [facility] complaining of right flank pain related to renal stone. CT from  10/11/18 reported 5 MM urethral stone. He has ESWL 10/12/18 by Dr. Whitley. Patient is doing well post op and denies any pain and is ready to be discharged. He will need to follow up with Urology as recommended. He should follow up with PCP 1-2 weeks.  Please see the admitting history and physical for further details.          VITAL SIGNS:  Temp:  [96.1 °F (35.6 °C)-98.6 °F (37 °C)] 96.2 °F (35.7 °C)  Heart Rate:  [53-98] 53  Resp:  [14-18] 18  BP: (101-139)/(65-87) 101/65  SpO2:  [95 %-98 %] 96 %  on  Flow (L/min):  [4] 4;   Device (Oxygen Therapy): room air  Body mass index is 32.07 kg/m².  Wt Readings from Last 1 Encounters:   10/11/18 0902 95.7 kg (210 lb 14.4 oz)   10/11/18 0603 90.7 kg (200 lb)     Wt Readings from Last 3 Encounters:   10/11/18 95.7 kg (210 lb 14.4 oz)       PHYSICAL EXAM:    Constitutional:  Well-developed and well-nourished.  No respiratory distress.      HENT:  Head: Normocephalic and atraumatic.  Mouth:  Moist mucous membranes.    Eyes:  Conjunctivae and EOM are normal.  Pupils are equal, round, and reactive to light.  No scleral icterus.  Neck:  Neck supple.  No JVD present.    Cardiovascular:  Normal rate, regular rhythm and normal heart sounds with no murmur.  Pulmonary/Chest:  No respiratory distress, no wheezes, no crackles, with normal breath sounds and good air movement.  Abdominal:  Soft.  Bowel sounds are normal.  No distension and no tenderness.    Musculoskeletal:  No edema, no tenderness, and no deformity.  No red or swollen joints anywhere.    Neurological:  Alert and oriented to person, place, and time.  No cranial nerve deficit.  No tongue deviation.  No facial droop.  No slurred speech.   Skin:  Skin is warm and dry.  No rash noted.  No pallor.   Peripheral vascular:  No edema and strong pulses on all 4 extremities.  Genitourinary: right flank pain, much improved    DISCHARGE DISPOSITION   Stable    DISCHARGE MEDICATIONS:     Discharge Medications      New Medications      Instructions Start Date   oxyCODONE-acetaminophen  MG per tablet  Commonly known as:  PERCOCET   1 tablet, Oral, Every 4 Hours PRN         Continue These Medications      Instructions Start Date   atenolol 50 MG tablet  Commonly known as:  TENORMIN   50 mg, Oral, Daily      HAVRIX 1440 EL U/ML vaccine  Generic drug:  hepatitis A   1,440 Units, Intramuscular      multivitamin tablet tablet   1 tablet, Oral, Daily             Diet Instructions     Advance Diet as Tolerated           Future Appointments  Date Time Provider Department Center   10/22/2018 10:00 AM Elvin Whitley MD [facility] None       TEST  RESULTS PENDING AT DISCHARGE   none    CODE STATUS  Code Status and Medical Interventions:   Ordered at: 10/11/18 0910     Level Of Support Discussed With:    Patient     Code Status:    CPR     Medical Interventions (Level of Support Prior to Arrest):    Full       YESSENIA Elise  10/12/18  2:13 PM    Please note that this discharge summary required more than 30 minutes to complete.    Please send a copy of this dictation to the following providers:  Nanette Shepard APRN      Your Scheduled Appointments    Oct 22, 2018 10:00 AM EDT  Follow Up with Elvin Whitley MD  [facility] UR[facility] (--) [place] [address]  [phone]   Arrive 15 minutes prior to appointment.        Additional Instructions for the Follow-ups  that You Need to Schedule     Discharge Follow-up with PCP    As directed      Currently Documented PCP:  Nanette Shepard APRN  PCP Phone Number:  [phone]    Follow Up Details:  follow up with PCP 1-2 weeks         Discharge Follow-up with Specified Provider: 1 week with Juddidle    As directed      To:  1 week with Erikle           Follow-up Information     Nanette Shepard APRN .    Specialty:  Family Medicine  Why:  follow up with PCP 1-2 weeks  Contact information:  [address] [facility] [address]  [phone]                  Physician Attestation:    I have personally seen and examined the patient. I reviewed the patient's data including history of present illness, review of systems, physical examination, assessment and treatment plan and agree with findings above. The assessment and plan are my own.  I have reviewed and edited the note above after discussing the findings with the midlevel.    Ruslan Montalvo MD  Infectious Diseases  10/13/18  9:44 AM

## 2018-10-12 NOTE — OP NOTE
EXTRACORPOREAL SHOCKWAVE LITHOTRIPSY  Procedure Note    Francois Griffin  10/11/2018 - 10/12/2018    Pre-op Diagnosis:   Right ureteral stone [N20.1]    Post-op Diagnosis:     Post-Op Diagnosis Codes:     * Right ureteral stone [N20.1]    Procedure/CPT® Codes:   43-year-old white male with a right 6 mm upper ureteral calculus.  This is his first stone.  He is for lithotripsy.  ESWL-the patient is a candidate for extracorporeal shockwave lithotripsy.  We discussed the size type of stone.  And the complications associated with the procedure including but not limited to pain in the flank, hematoma, spontaneous renal hemorrhage, and adequate fragmentation of stones as well as the need for passage of the stone fragments. The need for concomitant additional procedures in the range of 24% and the risk of a distal fragment in the range of 3% requiring ureteroscopic removal..  Fact that sometimes a stent is indicated based on the size and the density of the stone as determined on the CAT scan.  Following an extensive informed consent he is brought to the operative suite and underwent induction of general endotracheal anesthetic the stone was localized at F2 and a total of 1500 shockwaves administered without complication.  There was excellent fragmentation and the patient was awake and alert return to recovery room.          Procedure(s):  EXTRACORPOREAL SHOCKWAVE LITHOTRIPSY    Surgeon(s):  Elvin Whitley MD    Anesthesia: see anesthesia record    Staff:   Circulator: Sofie Ag RN  Other: Christa Rubio    Estimated Blood Loss: none  Urine Voided: * No values recorded between 10/12/2018 11:53 AM and 10/12/2018 12:13 PM *    Specimens:                None      Drains: None    Findings: Excellent fragmentation    Blood: N/A    Complications: None    Grafts and Implants: None    Elvin Whitley MD     Date: 10/12/2018  Time: 12:13 PM

## 2018-10-12 NOTE — ANESTHESIA POSTPROCEDURE EVALUATION
Patient: Francois Griffin    Procedure Summary     Date:  10/12/18 Room / Location:  [facility] OR 08 /  [facility] OR    Anesthesia Start:  1153 Anesthesia Stop:  1220    Procedure:  EXTRACORPOREAL SHOCKWAVE LITHOTRIPSY (Right ) Diagnosis:       Right ureteral stone      (Right ureteral stone [N20.1])    Surgeon:  Elvin Whitley MD Provider:  Rajeev Henderson MD    Anesthesia Type:  general ASA Status:  2          Anesthesia Type: general  Last vitals  BP   105/75 (10/12/18 1250)   Temp   98.3 °F (36.8 °C) (10/12/18 1250)   Pulse   64 (10/12/18 1250)   Resp   16 (10/12/18 1250)     SpO2   96 % (10/12/18 1250)     Post Anesthesia Care and Evaluation    Patient location during evaluation: PHASE II  Patient participation: complete - patient participated  Level of consciousness: awake and alert  Pain score: 1  Pain management: adequate  Airway patency: patent  Anesthetic complications: No anesthetic complications  PONV Status: controlled  Cardiovascular status: acceptable  Respiratory status: acceptable  Hydration status: acceptable

## 2018-10-22 ENCOUNTER — HOSPITAL ENCOUNTER (OUTPATIENT)
Dept: GENERAL RADIOLOGY | Facility: HOSPITAL | Age: 43
Discharge: HOME | End: 2018-10-22
Attending: UROLOGY | Admitting: UROLOGY

## 2018-10-22 ENCOUNTER — OFFICE VISIT (OUTPATIENT)
Dept: UROLOGY | Facility: CLINIC | Age: 43
End: 2018-10-22

## 2018-10-22 VITALS — WEIGHT: 210 LBS | BODY MASS INDEX: 31.83 KG/M2 | HEIGHT: 68 IN

## 2018-10-22 DIAGNOSIS — N20.1 RIGHT URETERAL STONE: ICD-10-CM

## 2018-10-22 DIAGNOSIS — N20.0 KIDNEY STONE: Primary | ICD-10-CM

## 2018-10-22 PROCEDURE — 74018 RADEX ABDOMEN 1 VIEW: CPT | Performed by: RADIOLOGY

## 2018-10-22 PROCEDURE — 99024 POSTOP FOLLOW-UP VISIT: CPT | Performed by: UROLOGY

## 2018-10-22 PROCEDURE — 74018 RADEX ABDOMEN 1 VIEW: CPT

## 2018-10-22 RX ORDER — HYDROCODONE BITARTRATE AND ACETAMINOPHEN 10; 325 MG/1; MG/1
1 TABLET ORAL EVERY 6 HOURS PRN
Qty: 15 TABLET | Refills: 0 | Status: SHIPPED | OUTPATIENT
Start: 2018-10-22 | End: 2019-04-02

## 2018-10-22 RX ORDER — TAMSULOSIN HYDROCHLORIDE 0.4 MG/1
1 CAPSULE ORAL NIGHTLY
Qty: 30 CAPSULE | Refills: 2 | Status: SHIPPED | OUTPATIENT
Start: 2018-10-22 | End: 2019-01-02 | Stop reason: SDUPTHER

## 2018-10-22 NOTE — PROGRESS NOTES
Chief Complaint:          Chief Complaint   Patient presents with   • Nephrolithiasis     ESWL surgery f/u       HPI:   43 y.o. male.  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  A KUB performed today reveals a right 3 mm distal fragment just at the level of the ureterovesical junction.  I discussed this with him.  He has greater than a 75-85% chance of spontaneous passage.  I'm going to make the addition of medical expulsive therapy I offered him a surgical intervention but I'm confident he'll pass the stone without difficulty.  I'll see him back in a week he was given adequate pain medication and Flomax.  There is no indication for acute intervention, no fevers or chills or evidence of urinary tract infection.    Past Medical History:        Past Medical History:   Diagnosis Date   • Elevated cholesterol    • Hyperlipidemia    • Hypertension    • Kidney stone    • PONV (postoperative nausea and vomiting)          Current Meds:     Current Outpatient Prescriptions   Medication Sig Dispense Refill   • atenolol (TENORMIN) 50 MG tablet Take 1 tablet by mouth Daily. 90 tablet 1   • hepatitis A (HAVRIX) 1440 EL U/ML vaccine Inject 1 mL into the appropriate muscle as directed by prescriber. 1 mL 1   • multivitamin (DAILY RAMBO) tablet tablet Take 1 tablet by mouth Daily.     • oxyCODONE-acetaminophen (PERCOCET)  MG per tablet Take 1 tablet by mouth Every 4 (Four) Hours As Needed for Moderate Pain 10 tablet 0     No current facility-administered medications for this visit.         Allergies:      No Known Allergies     Past Surgical History:     Past Surgical History:   Procedure Laterality Date   • APPENDECTOMY     • CHOLECYSTECTOMY     • EXTRACORPOREAL SHOCK WAVE LITHOTRIPSY (ESWL) Right 10/12/2018    Procedure: EXTRACORPOREAL SHOCKWAVE  LITHOTRIPSY;  Surgeon: Elvin Whitley MD;  Location: [facility];  Service: Urology   • VOCAL CORD BIOPSY           Social History:     Social History     Social History   • Marital status:      Spouse name: N/A   • Number of children: N/A   • Years of education: N/A     Occupational History   • Not on file.     Social History Main Topics   • Smoking status: Never Smoker   • Smokeless tobacco: Never Used   • Alcohol use No   • Drug use: No   • Sexual activity: Defer     Other Topics Concern   • Not on file     Social History Narrative   • No narrative on file       Family History:     History reviewed. No pertinent family history.    Review of Systems:     Review of Systems   Constitutional: Negative.    HENT: Negative.    Eyes: Negative.    Respiratory: Negative.    Cardiovascular: Negative.    Gastrointestinal: Negative.    Endocrine: Negative.    Musculoskeletal: Negative.    Allergic/Immunologic: Negative.    Neurological: Negative.    Hematological: Negative.    Psychiatric/Behavioral: Negative.        Physical Exam:     Physical Exam   Constitutional: He is oriented to person, place, and time. He appears well-developed and well-nourished.   HENT:   Head: Normocephalic and atraumatic.   Eyes: Pupils are equal, round, and reactive to light. Conjunctivae and EOM are normal.   Neck: Normal range of motion.   Cardiovascular: Normal rate, regular rhythm, normal heart sounds and intact distal pulses.    Pulmonary/Chest: Effort normal and breath sounds normal.   Abdominal: Soft. Bowel sounds are normal.   Musculoskeletal: Normal range of motion.   Neurological: He is alert and oriented to person, place, and time. He has normal reflexes.   Skin: Skin is warm and dry.   Psychiatric: He has a normal mood and affect. His behavior is normal. Judgment and thought content normal.   Nursing note and vitals reviewed.      I have reviewed the following portions of the patient's history: allergies, current  medications, past family history, past medical history, past social history, past surgical history, problem list and ROS and confirm it's accurate.      Procedure:       Assessment/Plan:   Ureteral calculus-patient has been diagnosed with a ureteral calculus.  We have discussed the various parameters regarding spontaneous passage including the notion that a 2 mm stone has a high likelihood of spontaneous passage versus a larger stone being caught up in the upper areas of the urinary tract.  We also discussed the medical management of stone disease and the use of medical expulsive therapy in the form of Flomax.  This is used in an off label setting.  We discussed the indicators for intervention including  absolute indicators such as sepsis and uncontrollable severe pain as well as  the relative indicators of moderate pain that is well-controlled with various analgesia.  I also talked about nonoperative management including ambulation and increasing fluids and hot tub as being an effective adjuncts in the treatment of a ureteral stone.  He was given Flomax and hydrocodone.  He is a substantial risk of spontaneous passage.  He understands the need for intervention should one of the absolute indicators arise I'm when he give him a week to pass it.     Patient's Body mass index is 31.93 kg/m². BMI is above normal parameters. Recommendations include: educational material.          This document has been electronically signed by DEL METZ MD October 22, 2018 9:35 AM

## 2019-01-02 DIAGNOSIS — N20.0 KIDNEY STONE: ICD-10-CM

## 2019-01-02 RX ORDER — TAMSULOSIN HYDROCHLORIDE 0.4 MG/1
1 CAPSULE ORAL NIGHTLY
Qty: 30 CAPSULE | Refills: 2 | Status: SHIPPED | OUTPATIENT
Start: 2019-01-02 | End: 2021-03-10

## 2019-02-07 ENCOUNTER — OFFICE VISIT (OUTPATIENT)
Dept: FAMILY MEDICINE CLINIC | Facility: CLINIC | Age: 44
End: 2019-02-07

## 2019-02-07 VITALS
HEIGHT: 69 IN | HEART RATE: 57 BPM | WEIGHT: 217 LBS | SYSTOLIC BLOOD PRESSURE: 125 MMHG | TEMPERATURE: 98.7 F | OXYGEN SATURATION: 99 % | BODY MASS INDEX: 32.14 KG/M2 | DIASTOLIC BLOOD PRESSURE: 83 MMHG

## 2019-02-07 DIAGNOSIS — I10 ESSENTIAL HYPERTENSION: Primary | ICD-10-CM

## 2019-02-07 DIAGNOSIS — E78.2 MIXED HYPERLIPIDEMIA: ICD-10-CM

## 2019-02-07 PROCEDURE — 99203 OFFICE O/P NEW LOW 30 MIN: CPT | Performed by: FAMILY MEDICINE

## 2019-02-07 RX ORDER — ATENOLOL 50 MG/1
50 TABLET ORAL DAILY
Qty: 90 TABLET | Refills: 1 | Status: CANCELLED | OUTPATIENT
Start: 2019-02-07

## 2019-02-07 RX ORDER — METOPROLOL SUCCINATE 50 MG/1
50 TABLET, EXTENDED RELEASE ORAL DAILY
Qty: 30 TABLET | Refills: 5 | Status: SHIPPED | OUTPATIENT
Start: 2019-02-07 | End: 2019-04-02

## 2019-02-18 ENCOUNTER — APPOINTMENT (OUTPATIENT)
Dept: LAB | Facility: HOSPITAL | Age: 44
End: 2019-02-18

## 2019-02-18 LAB
ALBUMIN SERPL-MCNC: 4.2 G/DL (ref 3.5–5)
ALBUMIN/GLOB SERPL: 1.4 G/DL (ref 1.5–2.5)
ALP SERPL-CCNC: 69 U/L (ref 40–129)
ALT SERPL W P-5'-P-CCNC: 25 U/L (ref 10–44)
ANION GAP SERPL CALCULATED.3IONS-SCNC: 4.6 MMOL/L (ref 3.6–11.2)
AST SERPL-CCNC: 18 U/L (ref 10–34)
BASOPHILS # BLD AUTO: 0.04 10*3/MM3 (ref 0–0.3)
BASOPHILS NFR BLD AUTO: 0.6 % (ref 0–2)
BILIRUB SERPL-MCNC: 1.3 MG/DL (ref 0.2–1.8)
BUN BLD-MCNC: 12 MG/DL (ref 7–21)
BUN/CREAT SERPL: 13.8 (ref 7–25)
CALCIUM SPEC-SCNC: 9.2 MG/DL (ref 7.7–10)
CHLORIDE SERPL-SCNC: 110 MMOL/L (ref 99–112)
CHOLEST SERPL-MCNC: 194 MG/DL (ref 0–200)
CO2 SERPL-SCNC: 26.4 MMOL/L (ref 24.3–31.9)
CREAT BLD-MCNC: 0.87 MG/DL (ref 0.43–1.29)
DEPRECATED RDW RBC AUTO: 38.2 FL (ref 37–54)
EOSINOPHIL # BLD AUTO: 0.1 10*3/MM3 (ref 0–0.7)
EOSINOPHIL NFR BLD AUTO: 1.6 % (ref 0–5)
ERYTHROCYTE [DISTWIDTH] IN BLOOD BY AUTOMATED COUNT: 12.7 % (ref 11.5–14.5)
GFR SERPL CREATININE-BSD FRML MDRD: 97 ML/MIN/1.73
GLOBULIN UR ELPH-MCNC: 3 GM/DL
GLUCOSE BLD-MCNC: 88 MG/DL (ref 70–110)
HCT VFR BLD AUTO: 44.3 % (ref 42–52)
HDLC SERPL-MCNC: 38 MG/DL (ref 60–100)
HGB BLD-MCNC: 15.9 G/DL (ref 14–18)
IMM GRANULOCYTES # BLD AUTO: 0.02 10*3/MM3 (ref 0–0.03)
IMM GRANULOCYTES NFR BLD AUTO: 0.3 % (ref 0–0.5)
LDLC SERPL CALC-MCNC: 115 MG/DL (ref 0–100)
LDLC/HDLC SERPL: 3.02 {RATIO}
LYMPHOCYTES # BLD AUTO: 2.03 10*3/MM3 (ref 1–3)
LYMPHOCYTES NFR BLD AUTO: 32.4 % (ref 21–51)
MCH RBC QN AUTO: 30.1 PG (ref 27–33)
MCHC RBC AUTO-ENTMCNC: 35.9 G/DL (ref 33–37)
MCV RBC AUTO: 83.9 FL (ref 80–94)
MONOCYTES # BLD AUTO: 0.49 10*3/MM3 (ref 0.1–0.9)
MONOCYTES NFR BLD AUTO: 7.8 % (ref 0–10)
NEUTROPHILS # BLD AUTO: 3.59 10*3/MM3 (ref 1.4–6.5)
NEUTROPHILS NFR BLD AUTO: 57.3 % (ref 30–70)
OSMOLALITY SERPL CALC.SUM OF ELEC: 280.4 MOSM/KG (ref 273–305)
PLATELET # BLD AUTO: 296 10*3/MM3 (ref 130–400)
PMV BLD AUTO: 10.1 FL (ref 6–10)
POTASSIUM BLD-SCNC: 4.1 MMOL/L (ref 3.5–5.3)
PROT SERPL-MCNC: 7.2 G/DL (ref 6–8)
RBC # BLD AUTO: 5.28 10*6/MM3 (ref 4.7–6.1)
SODIUM BLD-SCNC: 141 MMOL/L (ref 135–153)
TRIGL SERPL-MCNC: 206 MG/DL (ref 0–150)
TSH SERPL DL<=0.05 MIU/L-ACNC: 1.91 MIU/ML (ref 0.55–4.78)
VLDLC SERPL-MCNC: 41.2 MG/DL
WBC NRBC COR # BLD: 6.27 10*3/MM3 (ref 4.5–12.5)

## 2019-02-18 PROCEDURE — 80061 LIPID PANEL: CPT | Performed by: FAMILY MEDICINE

## 2019-02-18 PROCEDURE — 80053 COMPREHEN METABOLIC PANEL: CPT | Performed by: FAMILY MEDICINE

## 2019-02-18 PROCEDURE — 84443 ASSAY THYROID STIM HORMONE: CPT | Performed by: FAMILY MEDICINE

## 2019-02-18 PROCEDURE — 85025 COMPLETE CBC W/AUTO DIFF WBC: CPT | Performed by: FAMILY MEDICINE

## 2019-02-26 NOTE — PROGRESS NOTES
"Francois Griffin     VITALS: Blood pressure 125/83, pulse 57, temperature 98.7 °F (37.1 °C), temperature source Oral, height 175.3 cm (69\"), weight 98.4 kg (217 lb), SpO2 99 %.    Subjective  Chief Complaint:   Chief Complaint   Patient presents with   • Establish Care   • Hypertension        History of Present Illness:  Patient is a 43 y.o.  male who presents to clinic secondary to establishment of care.    Patient was seen today for these conditions and concerns:    Patient has hypertension and is on atenolol 50 mg orally daily. He does not really like this medication. Denies any dizziness, lightheadedness, blurry vision, chest pain, or edema.   Home blood pressure: No  Low salt diet: Yes    Patient has hyperlipidemia but failed statins.     Patient has a history of kidney stones.    No complaints about any of the medications.    The following portions of the patient's history were reviewed and updated as appropriate: allergies, current medications, past family history, past medical history, past social history, past surgical history and problem list.    Past Medical History  Past Medical History:   Diagnosis Date   • Elevated cholesterol    • Hyperlipidemia    • Hypertension    • Kidney stone    • PONV (postoperative nausea and vomiting)        Review of Systems  Constitutional: Denies any recent history of HAs, dizziness, fevers, chills, itching.  Eyes: Denies any changes in vision. Denies any blurry vision or diplopia.  Ears, Nose, Mouth, Throat: Denies any sore throat, rhinorrhea, or cough.  Cardiovascular: Denies any chest pain, pressure, or palpitations.  Respiratory: Denies any shortness of breath or wheezing.  Gastrointestinal: Denies any abdominal pain, nausea, vomiting, diarrhea, or constipation.  Genitourinary: Denies any changes in urination.  Musculoskeletal: Denies any muscle weakness.  Skin and/or breasts: Denies any rashes.  Neurological: Denies any changes in balance or gait.  Psychiatric: Denies any " anxiety, depression, or insomnia. Denies any suicidal or homicidal ideations.  Endocrine: Denies any heat or cold intolerance. Denies any voice changes, polydipsia, or polyuria.  Hematologic/Lymphatic: Denies any anemia or easy bruising.    Surgical History  Past Surgical History:   Procedure Laterality Date   • APPENDECTOMY     • CHOLECYSTECTOMY     • EXTRACORPOREAL SHOCK WAVE LITHOTRIPSY (ESWL) Right 10/12/2018    Procedure: EXTRACORPOREAL SHOCKWAVE LITHOTRIPSY;  Surgeon: Elvin Whitley MD;  Location: [facility];  Service: Urology   • VOCAL CORD BIOPSY         Family History  Family History   Problem Relation Age of Onset   • Hypertension Father    • Arthritis Father    • Heart disease Mother    • Hypertension Mother    • Diabetes Mother    • Cancer Maternal Grandmother    • Cancer Maternal Grandfather    • Stroke Maternal Grandfather    • Cancer Paternal Grandfather        Social History  Social History     Socioeconomic History   • Marital status:      Spouse name: Not on file   • Number of children: Not on file   • Years of education: Not on file   • Highest education level: Not on file   Social Needs   • Financial resource strain: Not on file   • Food insecurity - worry: Not on file   • Food insecurity - inability: Not on file   • Transportation needs - medical: Not on file   • Transportation needs - non-medical: Not on file   Occupational History   • Not on file   Tobacco Use   • Smoking status: Never Smoker   • Smokeless tobacco: Never Used   Substance and Sexual Activity   • Alcohol use: No   • Drug use: No   • Sexual activity: Defer   Other Topics Concern   • Not on file   Social History Narrative   • Not on file       Objective  Physical Exam  Gen: Patient in NAD. Pleasant and answers appropriately. A&Ox3.    Skin: Warm and dry with normal turgor. No purpura, rashes, or unusual pigmentation noted. Hair is normal in appearance and distribution.    HEENT: NC/AT. No lesions noted. Conjunctiva  clear, sclera nonicteric. PERRL. EOMI without nystagmus or strabismus. Fundi appear benign. No hemorrhages or exudates of eyes. Auditory canals are patent bilaterally without lesions. TMs intact,  nonerythematous, nonbulging without lesions. Nasal mucosa pink, nonerythematous, and nonedematous. Frontal and maxillary sinuses are nontender. O/P nonerythematous and moist without exudate.    Neck: Supple without lymph nodes palpated. FROM.     Lungs: CTA B/L without rales, rhonchi, crackles, or wheezes.    Heart: RRR. S1 and S2 normal. No S3 or S4. No MRGT.    Abd: Soft, nontender,nondistended. (+)BSx4 quadrants.     Extrem: No CCE. Radial pulses 2+/4 and equal B/L. FROMx4. No bone, joint, or muscle tenderness noted.    Neuro: No focal motor/sensory deficits.    Procedures    Assessment/Plan  Francois Griffin is a 43 y.o. here for establishment of care.  Diagnoses and all orders for this visit:    Essential hypertension  -     CBC & Differential  -     Comprehensive Metabolic Panel  -     TSH  -     CBC Auto Differential  -     Osmolality, Calculated; Future  -     Osmolality, Calculated  -     Cancel: atenolol (TENORMIN) 50 MG tablet; Take 1 tablet by mouth Daily.  -     metoprolol succinate XL (TOPROL-XL) 50 MG 24 hr tablet; Take 1 tablet by mouth Daily.    Mixed hyperlipidemia  -     Lipid Panel      Patient's Body mass index is 32.05 kg/m². BMI is above normal parameters. Recommendations include: exercise counseling and nutrition counseling.        Findings and plans discussed with patient who verbalizes understanding and agreement. Will followup with patient once results are in. Patient to followup at clinic PRN or in three months for further medical followup.    Holly Rodriguez MD

## 2019-02-28 ENCOUNTER — LAB (OUTPATIENT)
Dept: LAB | Facility: HOSPITAL | Age: 44
End: 2019-02-28

## 2019-02-28 ENCOUNTER — OFFICE VISIT (OUTPATIENT)
Dept: FAMILY MEDICINE CLINIC | Facility: CLINIC | Age: 44
End: 2019-02-28

## 2019-02-28 ENCOUNTER — HOSPITAL ENCOUNTER (OUTPATIENT)
Dept: GENERAL RADIOLOGY | Facility: HOSPITAL | Age: 44
Discharge: HOME | End: 2019-02-28
Admitting: FAMILY MEDICINE

## 2019-02-28 ENCOUNTER — TELEPHONE (OUTPATIENT)
Dept: FAMILY MEDICINE CLINIC | Facility: CLINIC | Age: 44
End: 2019-02-28

## 2019-02-28 VITALS
SYSTOLIC BLOOD PRESSURE: 150 MMHG | BODY MASS INDEX: 31.55 KG/M2 | HEIGHT: 69 IN | HEART RATE: 112 BPM | DIASTOLIC BLOOD PRESSURE: 80 MMHG | OXYGEN SATURATION: 99 % | WEIGHT: 213 LBS

## 2019-02-28 DIAGNOSIS — R07.1 CHEST PAIN ON BREATHING: Primary | ICD-10-CM

## 2019-02-28 DIAGNOSIS — I10 ESSENTIAL HYPERTENSION: ICD-10-CM

## 2019-02-28 DIAGNOSIS — R00.0 TACHYCARDIA: ICD-10-CM

## 2019-02-28 DIAGNOSIS — R07.1 CHEST PAIN ON BREATHING: ICD-10-CM

## 2019-02-28 LAB
CK MB SERPL-CCNC: 1.04 NG/ML (ref 0–5)
TROPONIN I SERPL-MCNC: <0.006 NG/ML

## 2019-02-28 PROCEDURE — 93000 ELECTROCARDIOGRAM COMPLETE: CPT | Performed by: FAMILY MEDICINE

## 2019-02-28 PROCEDURE — 36415 COLL VENOUS BLD VENIPUNCTURE: CPT

## 2019-02-28 PROCEDURE — 71046 X-RAY EXAM CHEST 2 VIEWS: CPT

## 2019-02-28 PROCEDURE — 84484 ASSAY OF TROPONIN QUANT: CPT

## 2019-02-28 PROCEDURE — 82553 CREATINE MB FRACTION: CPT

## 2019-02-28 PROCEDURE — 99214 OFFICE O/P EST MOD 30 MIN: CPT | Performed by: FAMILY MEDICINE

## 2019-02-28 PROCEDURE — 71046 X-RAY EXAM CHEST 2 VIEWS: CPT | Performed by: RADIOLOGY

## 2019-02-28 RX ORDER — ATENOLOL 50 MG/1
50 TABLET ORAL DAILY
Qty: 90 TABLET | Refills: 1 | Status: SHIPPED | OUTPATIENT
Start: 2019-02-28 | End: 2019-08-26 | Stop reason: SDUPTHER

## 2019-03-01 ENCOUNTER — TELEPHONE (OUTPATIENT)
Dept: FAMILY MEDICINE CLINIC | Facility: CLINIC | Age: 44
End: 2019-03-01

## 2019-03-01 NOTE — TELEPHONE ENCOUNTER
----- Message from Holly Rodriguez MD sent at 3/1/2019  8:54 AM EST -----  Please call Francois. CXR okay. We had a scary episode yesterday where his blood pressures and his heart rates were really high yesterday and he had pressure in his chest. How is he feeling today? Any chest pain? 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 (If he's having any symptoms, I was going to try to get him into cardiology today). If he's okay, please make him an appointment for next week for followup. Thanks.    Spoke with patient,he reports he is much better today ,a little pressure earlier but says he is fine,B/P=124/83 HR=80s,schedule a follow up for 3/05/19.

## 2019-03-14 ENCOUNTER — OFFICE VISIT (OUTPATIENT)
Dept: FAMILY MEDICINE CLINIC | Facility: CLINIC | Age: 44
End: 2019-03-14

## 2019-03-14 VITALS
HEART RATE: 84 BPM | BODY MASS INDEX: 31.4 KG/M2 | DIASTOLIC BLOOD PRESSURE: 90 MMHG | SYSTOLIC BLOOD PRESSURE: 120 MMHG | WEIGHT: 212 LBS | OXYGEN SATURATION: 99 % | HEIGHT: 69 IN | TEMPERATURE: 99 F

## 2019-03-14 DIAGNOSIS — I10 ESSENTIAL HYPERTENSION: ICD-10-CM

## 2019-03-14 DIAGNOSIS — E78.2 MIXED HYPERLIPIDEMIA: ICD-10-CM

## 2019-03-14 DIAGNOSIS — R00.2 PALPITATIONS: Primary | ICD-10-CM

## 2019-03-14 PROCEDURE — 99214 OFFICE O/P EST MOD 30 MIN: CPT | Performed by: FAMILY MEDICINE

## 2019-03-19 NOTE — PROGRESS NOTES
"Francois Griffin     VITALS: Blood pressure 150/80, pulse 112, height 175.3 cm (69.02\"), weight 96.6 kg (213 lb), SpO2 99 %.    Subjective  Chief Complaint:   Chief Complaint   Patient presents with   • Hypertension   • Rapid Heart Rate        History of Present Illness:  Patient is a 43 y.o.  male who presents to clinic secondary to an acute concern.  At his last visit, patient mentioned that he did not really like atenolol.  As a result we had switched him to metoprolol XL 50 mg orally daily.  Apparently he had just started taking the metoprolol.  He states that the blood pressures have shot up.  He states that he has started having palpitations.  Metoprolol does not make him feel well.  He started having chest pain today so he decided to call us.  Denies any nausea, fever, chills, numbness or tingling of the arms.    No complaints about any of the medications.    The following portions of the patient's history were reviewed and updated as appropriate: allergies, current medications, past family history, past medical history, past social history, past surgical history and problem list.    Past Medical History  Past Medical History:   Diagnosis Date   • Elevated cholesterol    • Hyperlipidemia    • Hypertension    • Kidney stone    • PONV (postoperative nausea and vomiting)        Review of Systems  Constitutional: Denies any recent history of fevers, chills, itching.  Eyes: Denies any changes in vision. Denies any blurry vision or diplopia.  Ears, Nose, Mouth, Throat: Denies any sore throat, rhinorrhea, or cough.  Cardiovascular: Denies any chest pain, pressure.  Respiratory: Denies any shortness of breath or wheezing.  Gastrointestinal: Denies any abdominal pain, nausea, vomiting, diarrhea, or constipation.  Genitourinary: Denies any changes in urination.  Musculoskeletal: Denies any muscle weakness.  Skin and/or breasts: Denies any rashes.  Neurological: Denies any changes in balance or gait.  Psychiatric: Denies any " anxiety, depression, or insomnia. Denies any suicidal or homicidal ideations.  Endocrine: Denies any heat or cold intolerance. Denies any voice changes, polydipsia, or polyuria.  Hematologic/Lymphatic: Denies any anemia or easy bruising.    Surgical History  Past Surgical History:   Procedure Laterality Date   • APPENDECTOMY     • CHOLECYSTECTOMY     • EXTRACORPOREAL SHOCK WAVE LITHOTRIPSY (ESWL) Right 10/12/2018    Procedure: EXTRACORPOREAL SHOCKWAVE LITHOTRIPSY;  Surgeon: Elvin Whitley MD;  Location: [facility];  Service: Urology   • VOCAL CORD BIOPSY         Family History  Family History   Problem Relation Age of Onset   • Hypertension Father    • Arthritis Father    • Heart disease Mother    • Hypertension Mother    • Diabetes Mother    • Cancer Maternal Grandmother    • Cancer Maternal Grandfather    • Stroke Maternal Grandfather    • Cancer Paternal Grandfather        Social History  Social History     Socioeconomic History   • Marital status:      Spouse name: Not on file   • Number of children: Not on file   • Years of education: Not on file   • Highest education level: Not on file   Social Needs   • Financial resource strain: Not on file   • Food insecurity - worry: Not on file   • Food insecurity - inability: Not on file   • Transportation needs - medical: Not on file   • Transportation needs - non-medical: Not on file   Occupational History   • Not on file   Tobacco Use   • Smoking status: Never Smoker   • Smokeless tobacco: Never Used   Substance and Sexual Activity   • Alcohol use: No   • Drug use: No   • Sexual activity: Defer   Other Topics Concern   • Not on file   Social History Narrative   • Not on file       Objective  Physical Exam    Gen: Patient in NAD. Pleasant and answers appropriately. A&Ox3.    Skin: Warm and dry with normal turgor. No purpura, rashes, or unusual pigmentation noted. Hair is normal in appearance and distribution.    HEENT: NC/AT. No lesions noted.  Conjunctiva clear, sclera nonicteric. PERRL. EOMI without nystagmus or strabismus. Fundi appear benign. No hemorrhages or exudates of eyes. Auditory canals are patent bilaterally without lesions. TMs intact,  nonerythematous, nonbulging without lesions. Nasal mucosa pink, nonerythematous, and nonedematous. Frontal and maxillary sinuses are nontender. O/P nonerythematous and moist without exudate.    Neck: Supple without lymph nodes palpated. FROM.     Lungs: CTA B/L without rales, rhonchi, crackles, or wheezes.    Heart: Tachycardic. S1 and S2 normal. No S3 or S4. No MRGT.    Abd: Soft, nontender,nondistended. (+)BSx4 quadrants.     Extrem: No CCE. Radial pulses 2+/4 and equal B/L. FROMx4. No bone, joint, or muscle tenderness noted.    Neuro: No focal motor/sensory deficits.      ECG 12 Lead  Date/Time: 2/28/2019 1:12 PM  Performed by: Holly Rodriguez MD  Authorized by: Holly Rodriguez MD   Comparison: not compared with previous ECG   Rhythm: sinus tachycardia  Rate: normal  Conduction: conduction normal  ST Segments: ST segments normal  T Waves: T waves normal  QRS axis: normal  Other: no other findings    Clinical impression: abnormal EKG  Comments: Sinus tachycardia with no acute ST or T changes.            Assessment/Plan  Francois Griffin is a 43 y.o. here for an acute concern.  Diagnoses and all orders for this visit:    Chest pain on breathing  -     Troponin T; Future  -     CK-MB; Future  -     Troponin; Future  -     XR Chest 2 View; Future  Most likely secondary to sinus tachycardia and elevated blood pressures, but secondary to possible cardiac etiology, will get cardiac enzymes and a chest x-ray.  EKG within normal limits.    Essential hypertension  -     atenolol (TENORMIN) 50 MG tablet; Take 1 tablet by mouth Daily.  Elevated.  Will restart atenolol 50 mg orally daily.    Tachycardia  Elevated.  Will restart atenolol 50 mg orally daily.      Patient's Body mass index is 31.44 kg/m². BMI is above  normal parameters. Recommendations include: exercise counseling and nutrition counseling.     Findings and plans discussed with patient who verbalizes understanding and agreement. Will followup with patient once results are in. Patient to followup at clinic PRN or in 2 weeks for further medical followup.    MD ARSALAN Sharma/Transcription Disclaimer:  Much of this encounter note is an electronic transcription/translation of spoken language to printed text.  The electronic translation of spoken language may permit erroneous, or at times, nonsensical words or phrases to be inadvertently transcribed.  Although I have reviewed the note for such errors, some may still exist.

## 2019-03-21 DIAGNOSIS — R00.2 PALPITATIONS: Primary | ICD-10-CM

## 2019-03-22 ENCOUNTER — HOSPITAL ENCOUNTER (OUTPATIENT)
Dept: RESPIRATORY THERAPY | Facility: HOSPITAL | Age: 44
Discharge: HOME | End: 2019-03-22
Admitting: FAMILY MEDICINE

## 2019-03-22 DIAGNOSIS — R00.2 PALPITATIONS: ICD-10-CM

## 2019-03-22 PROCEDURE — 93226 XTRNL ECG REC<48 HR SCAN A/R: CPT

## 2019-03-22 PROCEDURE — 93225 XTRNL ECG REC<48 HRS REC: CPT

## 2019-03-26 PROCEDURE — 93227 XTRNL ECG REC<48 HR R&I: CPT | Performed by: INTERNAL MEDICINE

## 2019-03-27 ENCOUNTER — TELEPHONE (OUTPATIENT)
Dept: FAMILY MEDICINE CLINIC | Facility: CLINIC | Age: 44
End: 2019-03-27

## 2019-03-27 NOTE — TELEPHONE ENCOUNTER
----- Message from Holly Rodriguez MD sent at 3/26/2019  9:49 PM EDT -----  Labs stable. Okay to either call or send letter to patient. Thanks.        Stable letter mailed.

## 2019-04-02 NOTE — PROGRESS NOTES
"Francois Griffin     VITALS: Blood pressure 120/90, pulse 84, temperature 99 °F (37.2 °C), temperature source Oral, height 175.3 cm (69.02\"), weight 96.2 kg (212 lb), SpO2 99 %.    Subjective  Chief Complaint:   Chief Complaint   Patient presents with   • Follow-up   • Palpitations        History of Present Illness:  Patient is a 43 y.o.  male with a history of hypertension who presents to clinic secondary to medical followup.  No new or acute concerns today. He has had palpitations here and there, but not to the extent that it had been previously on metoprolol.     Several visits ago, patient mentioned that he did not really like atenolol.  As a result we had switched him to metoprolol XL 50 mg orally daily.  Apparently he had just started taking the metoprolol, and the blood pressures shot up.  He states that he started having palpitations and chest pain.  Denied any nausea, fever, chills, numbness or tingling of the arms. EKG at that time along with cardiac enzymes were negative. We switched him back to atenolol; symptoms have subsided.      Patient has hyperlipidemia but failed statins.      Patient has a history of kidney stones.    No complaints about any of the medications.    The following portions of the patient's history were reviewed and updated as appropriate: allergies, current medications, past family history, past medical history, past social history, past surgical history and problem list.    Past Medical History  Past Medical History:   Diagnosis Date   • Elevated cholesterol    • Hyperlipidemia    • Hypertension    • Kidney stone    • PONV (postoperative nausea and vomiting)        Review of Systems  Constitutional: Denies any recent history of HAs, dizziness, fevers, chills, itching.  Eyes: Denies any changes in vision. Denies any blurry vision or diplopia.  Ears, Nose, Mouth, Throat: Denies any sore throat, rhinorrhea, or cough.  Cardiovascular: Denies any chest pain, pressure.  Respiratory: Denies " any shortness of breath or wheezing.  Gastrointestinal: Denies any abdominal pain, nausea, vomiting, diarrhea, or constipation.  Genitourinary: Denies any changes in urination.  Musculoskeletal: Denies any muscle weakness.  Skin and/or breasts: Denies any rashes.  Neurological: Denies any changes in balance or gait.  Psychiatric: Denies any anxiety, depression, or insomnia. Denies any suicidal or homicidal ideations.  Endocrine: Denies any heat or cold intolerance. Denies any voice changes, polydipsia, or polyuria.  Hematologic/Lymphatic: Denies any anemia or easy bruising.    Surgical History  Past Surgical History:   Procedure Laterality Date   • APPENDECTOMY     • CHOLECYSTECTOMY     • EXTRACORPOREAL SHOCK WAVE LITHOTRIPSY (ESWL) Right 10/12/2018    Procedure: EXTRACORPOREAL SHOCKWAVE LITHOTRIPSY;  Surgeon: Elvin Whitley MD;  Location: [facility];  Service: Urology   • VOCAL CORD BIOPSY         Family History  Family History   Problem Relation Age of Onset   • Hypertension Father    • Arthritis Father    • Heart disease Mother    • Hypertension Mother    • Diabetes Mother    • Cancer Maternal Grandmother    • Cancer Maternal Grandfather    • Stroke Maternal Grandfather    • Cancer Paternal Grandfather        Social History  Social History     Socioeconomic History   • Marital status:      Spouse name: Not on file   • Number of children: Not on file   • Years of education: Not on file   • Highest education level: Not on file   Tobacco Use   • Smoking status: Never Smoker   • Smokeless tobacco: Never Used   Substance and Sexual Activity   • Alcohol use: No   • Drug use: No   • Sexual activity: Defer       Objective  Physical Exam    Gen: Patient in NAD. Pleasant and answers appropriately. A&Ox3.    Skin: Warm and dry with normal turgor. No purpura, rashes, or unusual pigmentation noted. Hair is normal in appearance and distribution.    HEENT: NC/AT. No lesions noted. Conjunctiva clear, sclera  nonicteric. PERRL. EOMI without nystagmus or strabismus. Fundi appear benign. No hemorrhages or exudates of eyes. Auditory canals are patent bilaterally without lesions. TMs intact,  nonerythematous, nonbulging without lesions. Nasal mucosa pink, nonerythematous, and nonedematous. Frontal and maxillary sinuses are nontender. O/P nonerythematous and moist without exudate.    Neck: Supple without lymph nodes palpated. FROM.     Lungs: CTA B/L without rales, rhonchi, crackles, or wheezes.    Heart: RRR. S1 and S2 normal. No S3 or S4. No MRGT.    Abd: Soft, nontender,nondistended. (+)BSx4 quadrants.     Extrem: No CCE. Radial pulses 2+/4 and equal B/L. FROMx4. No bone, joint, or muscle tenderness noted.    Neuro: No focal motor/sensory deficits.    Procedures    Assessment/Plan  Francois Griffin is a 43 y.o. here for medical followup.  Diagnoses and all orders for this visit:    Palpitations  -     Holter Monitor - 72 Hour  -     Ambulatory Referral to Cardiology  Since palpitations are still happening occasionally, will do Holter and send to cardiology for further workup.     Essential hypertension  Well controlled on atenolol 50 mg orally daily.     Mixed hyperlipidemia  Last lipid panel 2/2019 was borderline - will work on diet and exercise.     Patient's Body mass index is 31.29 kg/m². BMI is above normal parameters. Recommendations include: exercise counseling and nutrition counseling.        Findings and plans discussed with patient who verbalizes understanding and agreement. Will followup with patient once results are in. Patient to followup at clinic PRN or in two months for further medical followup.    Holly Rodriguez MD    EMR Dragon/Transcription Disclaimer:  Much of this encounter note is an electronic transcription/translation of spoken language to printed text.  The electronic translation of spoken language may permit erroneous, or at times, nonsensical words or phrases to be inadvertently transcribed.   Although I have reviewed the note for such errors, some may still exist.

## 2019-05-24 RX ORDER — METOPROLOL SUCCINATE 50 MG/1
50 TABLET, EXTENDED RELEASE ORAL DAILY
Qty: 30 TABLET | Refills: 5 | OUTPATIENT
Start: 2019-05-24

## 2019-08-26 RX ORDER — ATENOLOL 50 MG/1
50 TABLET ORAL DAILY
Qty: 30 TABLET | Refills: 0 | Status: SHIPPED | OUTPATIENT
Start: 2019-08-26 | End: 2019-09-10 | Stop reason: SDUPTHER

## 2019-08-26 NOTE — TELEPHONE ENCOUNTER
Patient called for a refill on his atenolol,follow up scheduled & refilled x 1 month pending follow up.

## 2019-09-10 ENCOUNTER — OFFICE VISIT (OUTPATIENT)
Dept: FAMILY MEDICINE CLINIC | Facility: CLINIC | Age: 44
End: 2019-09-10

## 2019-09-10 VITALS
HEART RATE: 65 BPM | HEIGHT: 69 IN | OXYGEN SATURATION: 99 % | SYSTOLIC BLOOD PRESSURE: 123 MMHG | TEMPERATURE: 98.2 F | DIASTOLIC BLOOD PRESSURE: 90 MMHG | WEIGHT: 211 LBS | BODY MASS INDEX: 31.25 KG/M2

## 2019-09-10 DIAGNOSIS — I10 ESSENTIAL HYPERTENSION: Primary | ICD-10-CM

## 2019-09-10 DIAGNOSIS — K58.0 IRRITABLE BOWEL SYNDROME WITH DIARRHEA: ICD-10-CM

## 2019-09-10 DIAGNOSIS — E78.2 MIXED HYPERLIPIDEMIA: ICD-10-CM

## 2019-09-10 PROCEDURE — 99214 OFFICE O/P EST MOD 30 MIN: CPT | Performed by: FAMILY MEDICINE

## 2019-09-10 RX ORDER — ATENOLOL 50 MG/1
50 TABLET ORAL DAILY
Qty: 90 TABLET | Refills: 1 | Status: SHIPPED | OUTPATIENT
Start: 2019-09-10 | End: 2020-03-10 | Stop reason: SDUPTHER

## 2019-09-10 RX ORDER — DICYCLOMINE HCL 20 MG
20 TABLET ORAL 4 TIMES DAILY PRN
Qty: 120 TABLET | Refills: 5 | Status: SHIPPED | OUTPATIENT
Start: 2019-09-10 | End: 2020-03-10

## 2019-09-30 NOTE — PROGRESS NOTES
"Francois Griffin     VITALS: Blood pressure 123/90, pulse 65, temperature 98.2 °F (36.8 °C), temperature source Oral, height 175.3 cm (69.02\"), weight 95.7 kg (211 lb), SpO2 99 %.    Subjective  Chief Complaint:   Chief Complaint   Patient presents with   • Hypertension        History of Present Illness:  Patient is a 44 y.o.  male with a history of hypertension who presents to clinic secondary to medical followup.  Patient states that his IBS has been flaring up.  He currently is not on any medications for it.  He states that he feels that his colon is very spastic and irritable.  He states that he occasionally has diarrhea and occasionally has constipation.  It does not matter what he eats.  It is more related to stress.  It has been flaring up for the last 2 weeks.  Resting makes it better.  Anxiety makes it worse.    Patient has hypertension and is currently on atenolol 50 mg orally daily without any side effects.  He denies any dizziness, blurry vision, shortness of breath, chest pain, or edema.  He failed metoprolol.  He does not check his blood pressures at home.  He tries to adhere to a low-salt diet.    Patient has hyperlipidemia and has failed statins in the past.  He is currently on diet and exercise.  He tries to adhere to a low-cholesterol diet.  He denies any muscle weakness, jaundice, or itching.    No complaints about any of the medications.    The following portions of the patient's history were reviewed and updated as appropriate: allergies, current medications, past family history, past medical history, past social history, past surgical history and problem list.    Past Medical History  Past Medical History:   Diagnosis Date   • Elevated cholesterol    • Hyperlipidemia    • Hypertension    • Kidney stone    • PONV (postoperative nausea and vomiting)        Review of Systems   Respiratory: Negative for shortness of breath and wheezing.    Cardiovascular: Negative for chest pain and palpitations. "   Gastrointestinal: Positive for constipation and diarrhea. Negative for nausea and vomiting.       Surgical History  Past Surgical History:   Procedure Laterality Date   • APPENDECTOMY     • CHOLECYSTECTOMY     • EXTRACORPOREAL SHOCK WAVE LITHOTRIPSY (ESWL) Right 10/12/2018    Procedure: EXTRACORPOREAL SHOCKWAVE LITHOTRIPSY;  Surgeon: Elvin Whitley MD;  Location: [facility];  Service: Urology   • VOCAL CORD BIOPSY         Family History  Family History   Problem Relation Age of Onset   • Hypertension Father    • Arthritis Father    • Heart disease Mother    • Hypertension Mother    • Diabetes Mother    • Cancer Maternal Grandmother    • Cancer Maternal Grandfather    • Stroke Maternal Grandfather    • Cancer Paternal Grandfather        Social History  Social History     Socioeconomic History   • Marital status:      Spouse name: Not on file   • Number of children: Not on file   • Years of education: Not on file   • Highest education level: Not on file   Tobacco Use   • Smoking status: Never Smoker   • Smokeless tobacco: Never Used   Substance and Sexual Activity   • Alcohol use: No   • Drug use: No   • Sexual activity: Defer       Objective  Physical Exam    Gen: Patient in NAD. Pleasant and answers appropriately. A&Ox3.    Skin: Warm and dry with normal turgor. No purpura, rashes, or unusual pigmentation noted. Hair is normal in appearance and distribution.    HEENT: NC/AT. No lesions noted. Conjunctiva clear, sclera nonicteric. PERRL. EOMI without nystagmus or strabismus. Fundi appear benign. No hemorrhages or exudates of eyes. Auditory canals are patent bilaterally without lesions. TMs intact,  nonerythematous, nonbulging without lesions. Nasal mucosa pink, nonerythematous, and nonedematous. Frontal and maxillary sinuses are nontender. O/P nonerythematous and moist without exudate.    Neck: Supple without lymph nodes palpated. FROM.     Lungs: CTA B/L without rales, rhonchi, crackles, or  wheezes.    Heart: RRR. S1 and S2 normal. No S3 or S4. No MRGT.    Abd: Soft, nontender,nondistended. (+)BSx4 quadrants.     Extrem: No CCE. Radial pulses 2+/4 and equal B/L. FROMx4. No bone, joint, or muscle tenderness noted.    Neuro: No focal motor/sensory deficits.    Procedures    Assessment/Plan  Francois Griffin is a 44 y.o. here for medical followup.  Diagnoses and all orders for this visit:    Essential hypertension  -     atenolol (TENORMIN) 50 MG tablet; Take 1 tablet by mouth Daily.    Irritable bowel syndrome with diarrhea  -     dicyclomine (BENTYL) 20 MG tablet; Take 1 tablet by mouth 4 (Four) Times a Day As Needed (irritable bowel syndrome).    Mixed hyperlipidemia  Stable.  Continue diet and exercise.  Will need to recheck lipid panel in February 2020.    Patient's Body mass index is 31.14 kg/m². BMI is above normal parameters. Recommendations include: exercise counseling and nutrition counseling.     Findings and plans discussed with patient who verbalizes understanding and agreement. Will followup with patient once results are in. Patient to followup at clinic PRN or in six months for further medical followup.    Holly Rodriguez MD    EMR Dragon/Transcription Disclaimer:  Much of this encounter note is an electronic transcription/translation of spoken language to printed text.  The electronic translation of spoken language may permit erroneous, or at times, nonsensical words or phrases to be inadvertently transcribed.  Although I have reviewed the note for such errors, some may still exist.

## 2020-03-10 ENCOUNTER — OFFICE VISIT (OUTPATIENT)
Dept: FAMILY MEDICINE CLINIC | Facility: CLINIC | Age: 45
End: 2020-03-10

## 2020-03-10 VITALS
HEIGHT: 69 IN | SYSTOLIC BLOOD PRESSURE: 123 MMHG | OXYGEN SATURATION: 99 % | TEMPERATURE: 98.3 F | HEART RATE: 76 BPM | DIASTOLIC BLOOD PRESSURE: 82 MMHG | WEIGHT: 214 LBS | BODY MASS INDEX: 31.7 KG/M2

## 2020-03-10 DIAGNOSIS — E78.2 MIXED HYPERLIPIDEMIA: Primary | ICD-10-CM

## 2020-03-10 DIAGNOSIS — I10 ESSENTIAL HYPERTENSION: ICD-10-CM

## 2020-03-10 DIAGNOSIS — K58.0 IRRITABLE BOWEL SYNDROME WITH DIARRHEA: ICD-10-CM

## 2020-03-10 PROCEDURE — 80061 LIPID PANEL: CPT | Performed by: FAMILY MEDICINE

## 2020-03-10 PROCEDURE — 80053 COMPREHEN METABOLIC PANEL: CPT | Performed by: FAMILY MEDICINE

## 2020-03-10 PROCEDURE — 99213 OFFICE O/P EST LOW 20 MIN: CPT | Performed by: FAMILY MEDICINE

## 2020-03-10 RX ORDER — ATENOLOL 50 MG/1
50 TABLET ORAL DAILY
Qty: 90 TABLET | Refills: 3 | Status: SHIPPED | OUTPATIENT
Start: 2020-03-10 | End: 2021-03-10 | Stop reason: SDUPTHER

## 2020-03-11 LAB
ALBUMIN SERPL-MCNC: 4.2 G/DL (ref 3.5–5.2)
ALBUMIN/GLOB SERPL: 1.8 G/DL
ALP SERPL-CCNC: 68 U/L (ref 39–117)
ALT SERPL W P-5'-P-CCNC: 21 U/L (ref 1–41)
ANION GAP SERPL CALCULATED.3IONS-SCNC: 12.7 MMOL/L (ref 5–15)
AST SERPL-CCNC: 24 U/L (ref 1–40)
BILIRUB SERPL-MCNC: 1.6 MG/DL (ref 0.2–1.2)
BUN BLD-MCNC: 17 MG/DL (ref 6–20)
BUN/CREAT SERPL: 18.3 (ref 7–25)
CALCIUM SPEC-SCNC: 8.9 MG/DL (ref 8.6–10.5)
CHLORIDE SERPL-SCNC: 103 MMOL/L (ref 98–107)
CHOLEST SERPL-MCNC: 188 MG/DL (ref 0–200)
CO2 SERPL-SCNC: 25.3 MMOL/L (ref 22–29)
CREAT BLD-MCNC: 0.93 MG/DL (ref 0.76–1.27)
GFR SERPL CREATININE-BSD FRML MDRD: 89 ML/MIN/1.73
GLOBULIN UR ELPH-MCNC: 2.3 GM/DL
GLUCOSE BLD-MCNC: 86 MG/DL (ref 65–99)
HDLC SERPL-MCNC: 36 MG/DL (ref 40–60)
LDLC SERPL CALC-MCNC: 133 MG/DL (ref 0–100)
LDLC/HDLC SERPL: 3.68 {RATIO}
POTASSIUM BLD-SCNC: 4 MMOL/L (ref 3.5–5.2)
PROT SERPL-MCNC: 6.5 G/DL (ref 6–8.5)
SODIUM BLD-SCNC: 141 MMOL/L (ref 136–145)
TRIGL SERPL-MCNC: 97 MG/DL (ref 0–150)
VLDLC SERPL-MCNC: 19.4 MG/DL (ref 5–40)

## 2020-03-27 ENCOUNTER — TELEPHONE (OUTPATIENT)
Dept: FAMILY MEDICINE CLINIC | Facility: CLINIC | Age: 45
End: 2020-03-27

## 2020-03-27 NOTE — TELEPHONE ENCOUNTER
----- Message from Holly Rodriguez MD sent at 3/26/2020 10:20 PM EDT -----  Please call Francois. Labs are okay. His bad cholesterol did increase from last year, but his TGs have decreased, which shows that he has really worked on his diet. May be fighting a losing hackett against genetics though. Continue to do what he is doing. Will monitor. Numbers are still okay. Cardiac risk is 2.2% of a cardiac event in the next 10 years.    Patient notified and verbally understands.

## 2020-07-03 ENCOUNTER — LAB (OUTPATIENT)
Dept: LAB | Facility: HOSPITAL | Age: 45
End: 2020-07-03

## 2020-07-03 DIAGNOSIS — Z20.828 EXPOSURE TO SARS-ASSOCIATED CORONAVIRUS: ICD-10-CM

## 2020-07-03 DIAGNOSIS — Z20.828 EXPOSURE TO SARS-ASSOCIATED CORONAVIRUS: Primary | ICD-10-CM

## 2020-07-03 PROCEDURE — U0002 COVID-19 LAB TEST NON-CDC: HCPCS

## 2020-07-03 PROCEDURE — U0004 COV-19 TEST NON-CDC HGH THRU: HCPCS

## 2020-07-03 PROCEDURE — C9803 HOPD COVID-19 SPEC COLLECT: HCPCS

## 2020-07-04 LAB
REF LAB TEST METHOD: NORMAL
SARS-COV-2 RNA RESP QL NAA+PROBE: NOT DETECTED

## 2021-03-10 ENCOUNTER — OFFICE VISIT (OUTPATIENT)
Dept: FAMILY MEDICINE CLINIC | Facility: CLINIC | Age: 46
End: 2021-03-10

## 2021-03-10 ENCOUNTER — CONSULT (OUTPATIENT)
Dept: GASTROENTEROLOGY | Facility: CLINIC | Age: 46
End: 2021-03-10

## 2021-03-10 VITALS
OXYGEN SATURATION: 98 % | DIASTOLIC BLOOD PRESSURE: 91 MMHG | WEIGHT: 214.6 LBS | HEIGHT: 69 IN | HEART RATE: 81 BPM | SYSTOLIC BLOOD PRESSURE: 133 MMHG | BODY MASS INDEX: 31.78 KG/M2 | TEMPERATURE: 97.8 F

## 2021-03-10 VITALS
TEMPERATURE: 97.1 F | SYSTOLIC BLOOD PRESSURE: 110 MMHG | WEIGHT: 213 LBS | BODY MASS INDEX: 31.55 KG/M2 | HEIGHT: 69 IN | DIASTOLIC BLOOD PRESSURE: 88 MMHG | RESPIRATION RATE: 16 BRPM | OXYGEN SATURATION: 98 % | HEART RATE: 57 BPM

## 2021-03-10 DIAGNOSIS — K62.5 BRBPR (BRIGHT RED BLOOD PER RECTUM): Primary | ICD-10-CM

## 2021-03-10 DIAGNOSIS — K62.5 BRBPR (BRIGHT RED BLOOD PER RECTUM): ICD-10-CM

## 2021-03-10 DIAGNOSIS — Z01.818 PREOPERATIVE CLEARANCE: Primary | ICD-10-CM

## 2021-03-10 DIAGNOSIS — Z13.6 ENCOUNTER FOR LIPID SCREENING FOR CARDIOVASCULAR DISEASE: ICD-10-CM

## 2021-03-10 DIAGNOSIS — Z13.220 ENCOUNTER FOR LIPID SCREENING FOR CARDIOVASCULAR DISEASE: ICD-10-CM

## 2021-03-10 DIAGNOSIS — I10 ESSENTIAL HYPERTENSION: ICD-10-CM

## 2021-03-10 PROCEDURE — 82784 ASSAY IGA/IGD/IGG/IGM EACH: CPT | Performed by: FAMILY MEDICINE

## 2021-03-10 PROCEDURE — 80053 COMPREHEN METABOLIC PANEL: CPT | Performed by: FAMILY MEDICINE

## 2021-03-10 PROCEDURE — 83516 IMMUNOASSAY NONANTIBODY: CPT | Performed by: FAMILY MEDICINE

## 2021-03-10 PROCEDURE — 85025 COMPLETE CBC W/AUTO DIFF WBC: CPT | Performed by: FAMILY MEDICINE

## 2021-03-10 PROCEDURE — 80061 LIPID PANEL: CPT | Performed by: FAMILY MEDICINE

## 2021-03-10 PROCEDURE — 99204 OFFICE O/P NEW MOD 45 MIN: CPT | Performed by: INTERNAL MEDICINE

## 2021-03-10 PROCEDURE — 86671 FUNGUS NES ANTIBODY: CPT | Performed by: FAMILY MEDICINE

## 2021-03-10 PROCEDURE — 99214 OFFICE O/P EST MOD 30 MIN: CPT | Performed by: FAMILY MEDICINE

## 2021-03-10 PROCEDURE — 86255 FLUORESCENT ANTIBODY SCREEN: CPT | Performed by: FAMILY MEDICINE

## 2021-03-10 PROCEDURE — 36415 COLL VENOUS BLD VENIPUNCTURE: CPT | Performed by: FAMILY MEDICINE

## 2021-03-10 RX ORDER — ATENOLOL 50 MG/1
50 TABLET ORAL 2 TIMES DAILY
Qty: 180 TABLET | Refills: 1 | Status: SHIPPED | OUTPATIENT
Start: 2021-03-10 | End: 2021-06-10 | Stop reason: SDUPTHER

## 2021-03-10 RX ORDER — SODIUM, POTASSIUM,MAG SULFATES 17.5-3.13G
2 SOLUTION, RECONSTITUTED, ORAL ORAL EVERY 12 HOURS
Qty: 354 ML | Refills: 0 | Status: SHIPPED | OUTPATIENT
Start: 2021-03-10 | End: 2021-03-13

## 2021-03-10 NOTE — PROGRESS NOTES
Subjective     Francois Griffin is a 45 y.o. male who presents to the office today as a consultation from Holly Rodriguez MD for evaluation of Rectal Bleeding        History of Present Illness:  The patient presents for evaluation of rectal bleeding.  He states he the bleeding began about 3 months.  The blood is mixed with the stool.  He is having increased bowel movements as well.  This has worsened over the past year. The diarrhea began when he had his gallbladder taken.  His stool is a [place] score 6-7.  There is associated urgency.  The stool is small to large volume.  He does not have bleeding every time.  He has not had blood in a week.  No weight loss.  His appetite is good. No family history colon cancer. No family history of IBD.       Review of Systems:  Review of Systems   Constitutional: Negative for chills, fatigue and fever.   HENT: Negative for trouble swallowing.    Eyes: Negative.    Respiratory: Negative for cough, choking, chest tightness and shortness of breath.    Cardiovascular: Negative for chest pain.   Gastrointestinal: Positive for abdominal distention, abdominal pain, anal bleeding, blood in stool, diarrhea and nausea. Negative for constipation and vomiting.   Endocrine: Negative.    Genitourinary: Negative for difficulty urinating.   Musculoskeletal: Negative for back pain and neck pain.   Skin: Negative.    Allergic/Immunologic: Negative for environmental allergies and food allergies.   Neurological: Negative for dizziness, light-headedness and headaches.   Hematological: Does not bruise/bleed easily.   Psychiatric/Behavioral: Negative.        Past Medical History:  Past Medical History:   Diagnosis Date   • Elevated cholesterol    • Hyperlipidemia    • Hypertension    • Kidney stone    • PONV (postoperative nausea and vomiting)        Past Surgical History:  Past Surgical History:   Procedure Laterality Date   • APPENDECTOMY     • CHOLECYSTECTOMY     • EXTRACORPOREAL SHOCK WAVE LITHOTRIPSY  "(ESWL) Right 10/12/2018    Procedure: EXTRACORPOREAL SHOCKWAVE LITHOTRIPSY;  Surgeon: Elvin Whitley MD;  Location: [facility];  Service: Urology   • VOCAL CORD BIOPSY         Family History:  Family History   Problem Relation Age of Onset   • Hypertension Father    • Arthritis Father    • Heart disease Mother    • Hypertension Mother    • Diabetes Mother    • Cancer Maternal Grandmother    • Cancer Maternal Grandfather    • Stroke Maternal Grandfather    • Cancer Paternal Grandfather        Social History:  Social History     Socioeconomic History   • Marital status:      Spouse name: Not on file   • Number of children: Not on file   • Years of education: Not on file   • Highest education level: Not on file   Tobacco Use   • Smoking status: Never Smoker   • Smokeless tobacco: Never Used   Substance and Sexual Activity   • Alcohol use: No   • Drug use: No   • Sexual activity: Defer       Current Medication List:    Current Outpatient Medications:   •  atenolol (TENORMIN) 50 MG tablet, Take 1 tablet by mouth 2 (two) times a day., Disp: 180 tablet, Rfl: 1  •  ibuprofen (ADVIL,MOTRIN) 800 MG tablet, Take 1 tablet by mouth Every 6 (Six) Hours as needed for pain., Disp: 16 tablet, Rfl: 0  •  multivitamin (DAILY RAMBO) tablet tablet, Take 1 tablet by mouth Daily., Disp: , Rfl:   •  sodium-potassium-magnesium sulfates (Suprep Bowel Prep Kit) 17.5-3.13-1.6 GM/177ML solution oral solution, Take 2 bottles by mouth Every 12 (Twelve) Hours for 2 doses., Disp: 708 mL, Rfl: 0    Allergies:   Patient has no known allergies.    Vitals:  /91 (BP Location: Left arm, Patient Position: Sitting, Cuff Size: Adult)   Pulse 81   Temp 97.8 °F (36.6 °C)   Ht 175.3 cm (69\")   Wt 97.3 kg (214 lb 9.6 oz)   SpO2 98%   BMI 31.69 kg/m²     Physical Exam:  Physical Exam  Constitutional:       Appearance: He is normal weight.   HENT:      Head: Normocephalic and atraumatic.      Nose: Nose normal. No congestion or " rhinorrhea.   Eyes:      General: No scleral icterus.     Extraocular Movements: Extraocular movements intact.      Conjunctiva/sclera: Conjunctivae normal.      Pupils: Pupils are equal, round, and reactive to light.   Cardiovascular:      Rate and Rhythm: Normal rate and regular rhythm.      Pulses: Normal pulses.      Heart sounds: Normal heart sounds.   Pulmonary:      Effort: Pulmonary effort is normal.      Breath sounds: Normal breath sounds.   Abdominal:      General: Abdomen is flat. Bowel sounds are normal. There is no distension.      Palpations: Abdomen is soft. There is no shifting dullness, fluid wave, hepatomegaly, splenomegaly, mass or pulsatile mass.      Tenderness: There is no abdominal tenderness. There is no guarding or rebound.      Hernia: No hernia is present.   Musculoskeletal:         General: No swelling or tenderness.      Cervical back: Normal range of motion and neck supple.   Skin:     General: Skin is warm and dry.      Coloration: Skin is not jaundiced.   Neurological:      General: No focal deficit present.      Mental Status: He is alert and oriented to person, place, and time.   Psychiatric:         Mood and Affect: Mood normal.         Behavior: Behavior normal.         Results Review:  Lab Results:   No visits with results within 1 Month(s) from this visit.   Latest known visit with results is:   Lab on 07/03/2020   Component Date Value Ref Range Status   • Reference Lab Report 07/03/2020 Performed by [facility]   Final   • COVID19 07/03/2020 Not Detected  Not Detected - Ref. Range Final       Assessment/Plan     Visit Diagnoses:    ICD-10-CM ICD-9-CM   1. BRBPR (bright red blood per rectum)  K62.5 569.3       Plan:  The patient has had bright red blood per rectum for a while now.  The blood can be mixed with the stool.  So having numerous bowel movements daily.  He states this is occurred since his cholecystectomy.  I will have him undergo colonoscopy to rule out inflammatory  bowel disease versus neoplasm.  He will follow-up after his colonoscopy.    COLONOSCOPY WITH BIOPSY (N/A)      MEDICATION ISSUES:  Discussed medication options and treatment plan of prescribed medication as well as the risks, benefits, and side effects including potential falls, possible impaired driving and metabolic adversities among others. Patient is agreeable to call the office with any worsening of symptoms or onset of side effects. Patient is agreeable to call 911 or go to the nearest ER should he/she begin having SI/HI.     MEDS ORDERED DURING VISIT:  New Medications Ordered This Visit   Medications   • sodium-potassium-magnesium sulfates (Suprep Bowel Prep Kit) 17.5-3.13-1.6 GM/177ML solution oral solution     Sig: Take 2 bottles by mouth Every 12 (Twelve) Hours for 2 doses.     Dispense:  708 mL     Refill:  0       No follow-ups on file.             This document has been electronically signed by Mary Cosme MD   March 10, 2021 17:04 EST        Part of this note may be an electronic transcription/translation of spoken language to printed text using the Dragon Dictation System.

## 2021-03-10 NOTE — PROGRESS NOTES
"Francois Griffin     VITALS: Blood pressure 110/88, pulse 57, temperature 97.1 °F (36.2 °C), temperature source Temporal, resp. rate 16, height 175.3 cm (69\"), weight 96.6 kg (213 lb), SpO2 98 %.    Subjective  Chief Complaint  Hypertension (1 year follow up) and Rectal Bleeding    Subjective          History of Present Illness:  Patient is a 45 y.o.  male with medical conditions significant for hypertension and hyperlipidemia who presents to clinic secondary to medical followup.    The patient has noticed bright red rectal bleeding for the past 3 to 4 months. The bleeding is always bright red and intermittently \"completely covers the toilet and is not in his stool. He has not noticed any in his underwear. He also cites a history of hemorrhoids and pain following bowel movements along with a history of IBS. He was diagnosed by a previous PCP, although he has never been followed by a gastroenterologist or had a colonoscopy in the past. He denies a family history of Crohn's disease or ulcerative colitis; however he cites a maternal history of diverticulitis, as well as a history of colon cancer with his maternal grandmother. His grandmother was diagnosed when she was 45 years old and had a bowel resection preformed and did not require chemotherapy or radiation afterwards. He is unsure if his mother has had a colonoscopy in the past.  Additionally, he continues to have diarrhea intermittently.     Patient has hypertension and is currently on atenolol 50 mg orally twice a day without any side effects.  The patient states that his blood pressure has been great during the days. however when he has been working, his systolic pressure is in the 150s and his diastolic is in the low 100s. He attributes this to his stressful job. He has not eaten today.    No complaints about any of the medications.    The following portions of the patient's history were reviewed and updated as appropriate: allergies, current medications, past " "family history, past medical history, past social history, past surgical history and problem list.    Past Medical History  Past Medical History:   Diagnosis Date   • Elevated cholesterol    • Hyperlipidemia    • Hypertension    • Kidney stone    • PONV (postoperative nausea and vomiting)        Surgical History  Past Surgical History:   Procedure Laterality Date   • APPENDECTOMY     • CHOLECYSTECTOMY     • EXTRACORPOREAL SHOCK WAVE LITHOTRIPSY (ESWL) Right 10/12/2018    Procedure: EXTRACORPOREAL SHOCKWAVE LITHOTRIPSY;  Surgeon: Elvin Whitley MD;  Location: [facility];  Service: Urology   • VOCAL CORD BIOPSY         Family History  Family History   Problem Relation Age of Onset   • Hypertension Father    • Arthritis Father    • Heart disease Mother    • Hypertension Mother    • Diabetes Mother    • Cancer Maternal Grandmother    • Cancer Maternal Grandfather    • Stroke Maternal Grandfather    • Cancer Paternal Grandfather        Social History  Social History     Socioeconomic History   • Marital status:      Spouse name: Not on file   • Number of children: Not on file   • Years of education: Not on file   • Highest education level: Not on file   Tobacco Use   • Smoking status: Never Smoker   • Smokeless tobacco: Never Used   Substance and Sexual Activity   • Alcohol use: No   • Drug use: No   • Sexual activity: Defer       Objective   Vital Signs:   /88 (BP Location: Left arm, Patient Position: Sitting)   Pulse 57   Temp 97.1 °F (36.2 °C) (Temporal)   Resp 16   Ht 175.3 cm (69\")   Wt 96.6 kg (213 lb)   SpO2 98%   BMI 31.45 kg/m²     Physical Exam     Gen: Patient in NAD. Pleasant and answers appropriately. A&Ox3.    Skin: Warm and dry with normal turgor. No purpura, rashes, or unusual pigmentation noted. Hair is normal in appearance and distribution.    HEENT: NC/AT. No lesions noted. Conjunctiva clear, sclera nonicteric. PERRL. EOMI without nystagmus or strabismus. Fundi appear " benign. No hemorrhages or exudates of eyes. Auditory canals are patent bilaterally without lesions. TMs intact,  nonerythematous, nonbulging without lesions. Nasal mucosa pink, nonerythematous, and nonedematous. Frontal and maxillary sinuses are nontender. O/P nonerythematous and moist without exudate.    Neck: Supple without lymph nodes palpated. FROM.     Lungs: CTA B/L without rales, rhonchi, crackles, or wheezes.    Heart: RRR. S1 and S2 normal. No S3 or S4. No MRGT.    Abd: Soft, nondistended. Slightly tender in left lower quadrant. Positive bowel sounds x4, none hyperechoic or hypoechoic.  No HSM or masses.      Extrem: No CCE. Radial pulses 2+/4 and equal B/L. FROMx4. No bone, joint, or muscle tenderness noted.    Neuro: No focal motor/sensory deficits.    Procedures       Assessment and Plan    Francois Griffin is a 45 y.o. here for medical followup.      Problem List Items Addressed This Visit     None      Visit Diagnoses     BRBPR (bright red blood per rectum)    -  Primary    Relevant Orders    Ambulatory Referral to Gastroenterology    CBC Auto Differential    Comprehensive Metabolic Panel    Saccharomyces Cerevisiae Antibodies, IgG & IgA    Celiac Comprehensive Panel    Etiology uncertain.  May be secondary to just a polyp, but secondary to his family history will send to GI for colonoscopy. We will also order a GI screening for inflammatory enzymes.       Essential hypertension        Relevant Medications    atenolol (TENORMIN) 50 MG tablet    Secondary to elevations, will increase atenolol to 50 mg orally twice a day. He was advised to take his first dose of atenolol in the morning and when he returns home from work, he will check his blood pressure. If it is elevated, he will take his second dose; if not, he may skip it.      Other Relevant Orders    CBC Auto Differential    Comprehensive Metabolic Panel    Encounter for lipid screening for cardiovascular disease        Relevant Orders    Lipid Panel             Patient's Body mass index is 31.45 kg/m². BMI is above normal parameters. Recommendations include: exercise counseling and nutrition counseling.              Follow Up   Return in about 3 months (around 6/10/2021).  Findings and plans discussed with patient who verbalizes understanding and agreement. Will followup with patient once results are in. Patient was given instructions and counseling regarding his condition or for health maintenance advice. Please see specific information pulled into the AVS if appropriate.       LISBETH MARIA    EMR Dragon/Transcription Disclaimer:  Much of this encounter note is an electronic transcription/translation of spoken language to printed text.      Scribed for Holly Rodriguez MD by LISBETH MARIA.  03/10/21   10:04 EST    I have personally performed the services described in this document as scribed by the above individual, and it is both accurate and complete.  Holly Rodriguez MD  3/17/2021  08:42 EDT

## 2021-03-10 NOTE — H&P (VIEW-ONLY)
Subjective     Francois Griffin is a 45 y.o. male who presents to the office today as a consultation from Holly Rodriguez MD for evaluation of Rectal Bleeding        History of Present Illness:  The patient presents for evaluation of rectal bleeding.  He states he the bleeding began about 3 months.  The blood is mixed with the stool.  He is having increased bowel movements as well.  This has worsened over the past year. The diarrhea began when he had his gallbladder taken.  His stool is a [place] score 6-7.  There is associated urgency.  The stool is small to large volume.  He does not have bleeding every time.  He has not had blood in a week.  No weight loss.  His appetite is good. No family history colon cancer. No family history of IBD.       Review of Systems:  Review of Systems   Constitutional: Negative for chills, fatigue and fever.   HENT: Negative for trouble swallowing.    Eyes: Negative.    Respiratory: Negative for cough, choking, chest tightness and shortness of breath.    Cardiovascular: Negative for chest pain.   Gastrointestinal: Positive for abdominal distention, abdominal pain, anal bleeding, blood in stool, diarrhea and nausea. Negative for constipation and vomiting.   Endocrine: Negative.    Genitourinary: Negative for difficulty urinating.   Musculoskeletal: Negative for back pain and neck pain.   Skin: Negative.    Allergic/Immunologic: Negative for environmental allergies and food allergies.   Neurological: Negative for dizziness, light-headedness and headaches.   Hematological: Does not bruise/bleed easily.   Psychiatric/Behavioral: Negative.        Past Medical History:  Past Medical History:   Diagnosis Date   • Elevated cholesterol    • Hyperlipidemia    • Hypertension    • Kidney stone    • PONV (postoperative nausea and vomiting)        Past Surgical History:  Past Surgical History:   Procedure Laterality Date   • APPENDECTOMY     • CHOLECYSTECTOMY     • EXTRACORPOREAL SHOCK WAVE LITHOTRIPSY  "(ESWL) Right 10/12/2018    Procedure: EXTRACORPOREAL SHOCKWAVE LITHOTRIPSY;  Surgeon: Elvin Whitley MD;  Location: [facility];  Service: Urology   • VOCAL CORD BIOPSY         Family History:  Family History   Problem Relation Age of Onset   • Hypertension Father    • Arthritis Father    • Heart disease Mother    • Hypertension Mother    • Diabetes Mother    • Cancer Maternal Grandmother    • Cancer Maternal Grandfather    • Stroke Maternal Grandfather    • Cancer Paternal Grandfather        Social History:  Social History     Socioeconomic History   • Marital status:      Spouse name: Not on file   • Number of children: Not on file   • Years of education: Not on file   • Highest education level: Not on file   Tobacco Use   • Smoking status: Never Smoker   • Smokeless tobacco: Never Used   Substance and Sexual Activity   • Alcohol use: No   • Drug use: No   • Sexual activity: Defer       Current Medication List:    Current Outpatient Medications:   •  atenolol (TENORMIN) 50 MG tablet, Take 1 tablet by mouth 2 (two) times a day., Disp: 180 tablet, Rfl: 1  •  ibuprofen (ADVIL,MOTRIN) 800 MG tablet, Take 1 tablet by mouth Every 6 (Six) Hours as needed for pain., Disp: 16 tablet, Rfl: 0  •  multivitamin (DAILY RAMBO) tablet tablet, Take 1 tablet by mouth Daily., Disp: , Rfl:   •  sodium-potassium-magnesium sulfates (Suprep Bowel Prep Kit) 17.5-3.13-1.6 GM/177ML solution oral solution, Take 2 bottles by mouth Every 12 (Twelve) Hours for 2 doses., Disp: 708 mL, Rfl: 0    Allergies:   Patient has no known allergies.    Vitals:  /91 (BP Location: Left arm, Patient Position: Sitting, Cuff Size: Adult)   Pulse 81   Temp 97.8 °F (36.6 °C)   Ht 175.3 cm (69\")   Wt 97.3 kg (214 lb 9.6 oz)   SpO2 98%   BMI 31.69 kg/m²     Physical Exam:  Physical Exam  Constitutional:       Appearance: He is normal weight.   HENT:      Head: Normocephalic and atraumatic.      Nose: Nose normal. No congestion or " rhinorrhea.   Eyes:      General: No scleral icterus.     Extraocular Movements: Extraocular movements intact.      Conjunctiva/sclera: Conjunctivae normal.      Pupils: Pupils are equal, round, and reactive to light.   Cardiovascular:      Rate and Rhythm: Normal rate and regular rhythm.      Pulses: Normal pulses.      Heart sounds: Normal heart sounds.   Pulmonary:      Effort: Pulmonary effort is normal.      Breath sounds: Normal breath sounds.   Abdominal:      General: Abdomen is flat. Bowel sounds are normal. There is no distension.      Palpations: Abdomen is soft. There is no shifting dullness, fluid wave, hepatomegaly, splenomegaly, mass or pulsatile mass.      Tenderness: There is no abdominal tenderness. There is no guarding or rebound.      Hernia: No hernia is present.   Musculoskeletal:         General: No swelling or tenderness.      Cervical back: Normal range of motion and neck supple.   Skin:     General: Skin is warm and dry.      Coloration: Skin is not jaundiced.   Neurological:      General: No focal deficit present.      Mental Status: He is alert and oriented to person, place, and time.   Psychiatric:         Mood and Affect: Mood normal.         Behavior: Behavior normal.         Results Review:  Lab Results:   No visits with results within 1 Month(s) from this visit.   Latest known visit with results is:   Lab on 07/03/2020   Component Date Value Ref Range Status   • Reference Lab Report 07/03/2020 Performed by [facility]   Final   • COVID19 07/03/2020 Not Detected  Not Detected - Ref. Range Final       Assessment/Plan     Visit Diagnoses:    ICD-10-CM ICD-9-CM   1. BRBPR (bright red blood per rectum)  K62.5 569.3       Plan:  The patient has had bright red blood per rectum for a while now.  The blood can be mixed with the stool.  So having numerous bowel movements daily.  He states this is occurred since his cholecystectomy.  I will have him undergo colonoscopy to rule out inflammatory  bowel disease versus neoplasm.  He will follow-up after his colonoscopy.    COLONOSCOPY WITH BIOPSY (N/A)      MEDICATION ISSUES:  Discussed medication options and treatment plan of prescribed medication as well as the risks, benefits, and side effects including potential falls, possible impaired driving and metabolic adversities among others. Patient is agreeable to call the office with any worsening of symptoms or onset of side effects. Patient is agreeable to call 911 or go to the nearest ER should he/she begin having SI/HI.     MEDS ORDERED DURING VISIT:  New Medications Ordered This Visit   Medications   • sodium-potassium-magnesium sulfates (Suprep Bowel Prep Kit) 17.5-3.13-1.6 GM/177ML solution oral solution     Sig: Take 2 bottles by mouth Every 12 (Twelve) Hours for 2 doses.     Dispense:  708 mL     Refill:  0       No follow-ups on file.             This document has been electronically signed by Mary Cosme MD   March 10, 2021 17:04 EST        Part of this note may be an electronic transcription/translation of spoken language to printed text using the Dragon Dictation System.

## 2021-03-11 LAB
ALBUMIN SERPL-MCNC: 4.3 G/DL (ref 3.5–5.2)
ALBUMIN/GLOB SERPL: 1.5 G/DL
ALP SERPL-CCNC: 68 U/L (ref 39–117)
ALT SERPL W P-5'-P-CCNC: 20 U/L (ref 1–41)
ANION GAP SERPL CALCULATED.3IONS-SCNC: 9.7 MMOL/L (ref 5–15)
AST SERPL-CCNC: 20 U/L (ref 1–40)
BASOPHILS # BLD AUTO: 0.05 10*3/MM3 (ref 0–0.2)
BASOPHILS NFR BLD AUTO: 0.8 % (ref 0–1.5)
BILIRUB SERPL-MCNC: 1.1 MG/DL (ref 0–1.2)
BUN SERPL-MCNC: 14 MG/DL (ref 6–20)
BUN/CREAT SERPL: 16.5 (ref 7–25)
CALCIUM SPEC-SCNC: 9.1 MG/DL (ref 8.6–10.5)
CHLORIDE SERPL-SCNC: 105 MMOL/L (ref 98–107)
CHOLEST SERPL-MCNC: 194 MG/DL (ref 0–200)
CO2 SERPL-SCNC: 26.3 MMOL/L (ref 22–29)
CREAT SERPL-MCNC: 0.85 MG/DL (ref 0.76–1.27)
DEPRECATED RDW RBC AUTO: 41.2 FL (ref 37–54)
ENDOMYSIUM IGA SER QL: NEGATIVE
EOSINOPHIL # BLD AUTO: 0.1 10*3/MM3 (ref 0–0.4)
EOSINOPHIL NFR BLD AUTO: 1.7 % (ref 0.3–6.2)
ERYTHROCYTE [DISTWIDTH] IN BLOOD BY AUTOMATED COUNT: 12.9 % (ref 12.3–15.4)
GFR SERPL CREATININE-BSD FRML MDRD: 98 ML/MIN/1.73
GLIADIN PEPTIDE IGA SER-ACNC: 7 UNITS (ref 0–19)
GLIADIN PEPTIDE IGG SER-ACNC: 7 UNITS (ref 0–19)
GLOBULIN UR ELPH-MCNC: 2.8 GM/DL
GLUCOSE SERPL-MCNC: 80 MG/DL (ref 65–99)
HCT VFR BLD AUTO: 44.6 % (ref 37.5–51)
HDLC SERPL-MCNC: 35 MG/DL (ref 40–60)
HGB BLD-MCNC: 15.4 G/DL (ref 13–17.7)
IGA SERPL-MCNC: 222 MG/DL (ref 90–386)
IMM GRANULOCYTES # BLD AUTO: 0.02 10*3/MM3 (ref 0–0.05)
IMM GRANULOCYTES NFR BLD AUTO: 0.3 % (ref 0–0.5)
LDLC SERPL CALC-MCNC: 129 MG/DL (ref 0–100)
LDLC/HDLC SERPL: 3.59 {RATIO}
LYMPHOCYTES # BLD AUTO: 1.83 10*3/MM3 (ref 0.7–3.1)
LYMPHOCYTES NFR BLD AUTO: 30.8 % (ref 19.6–45.3)
MCH RBC QN AUTO: 30.1 PG (ref 26.6–33)
MCHC RBC AUTO-ENTMCNC: 34.5 G/DL (ref 31.5–35.7)
MCV RBC AUTO: 87.3 FL (ref 79–97)
MONOCYTES # BLD AUTO: 0.45 10*3/MM3 (ref 0.1–0.9)
MONOCYTES NFR BLD AUTO: 7.6 % (ref 5–12)
NEUTROPHILS NFR BLD AUTO: 3.5 10*3/MM3 (ref 1.7–7)
NEUTROPHILS NFR BLD AUTO: 58.8 % (ref 42.7–76)
NRBC BLD AUTO-RTO: 0 /100 WBC (ref 0–0.2)
PLATELET # BLD AUTO: 338 10*3/MM3 (ref 140–450)
PMV BLD AUTO: 10.7 FL (ref 6–12)
POTASSIUM SERPL-SCNC: 4.8 MMOL/L (ref 3.5–5.2)
PROT SERPL-MCNC: 7.1 G/DL (ref 6–8.5)
RBC # BLD AUTO: 5.11 10*6/MM3 (ref 4.14–5.8)
SODIUM SERPL-SCNC: 141 MMOL/L (ref 136–145)
TRIGL SERPL-MCNC: 167 MG/DL (ref 0–150)
TTG IGA SER-ACNC: <2 U/ML (ref 0–3)
TTG IGG SER-ACNC: 8 U/ML (ref 0–5)
VLDLC SERPL-MCNC: 30 MG/DL (ref 5–40)
WBC # BLD AUTO: 5.95 10*3/MM3 (ref 3.4–10.8)

## 2021-03-12 ENCOUNTER — LAB (OUTPATIENT)
Dept: LAB | Facility: HOSPITAL | Age: 46
End: 2021-03-12

## 2021-03-12 DIAGNOSIS — K62.5 BRBPR (BRIGHT RED BLOOD PER RECTUM): ICD-10-CM

## 2021-03-12 DIAGNOSIS — Z01.818 PREOPERATIVE CLEARANCE: ICD-10-CM

## 2021-03-12 LAB
BAKER'S YEAST IGA QN: <20 UNITS (ref 0–24.9)
BAKER'S YEAST IGG QN: <20 UNITS (ref 0–24.9)
SARS-COV-2 RNA NOSE QL NAA+PROBE: NOT DETECTED

## 2021-03-12 PROCEDURE — C9803 HOPD COVID-19 SPEC COLLECT: HCPCS

## 2021-03-12 PROCEDURE — U0004 COV-19 TEST NON-CDC HGH THRU: HCPCS

## 2021-03-15 ENCOUNTER — ANESTHESIA EVENT (OUTPATIENT)
Dept: PERIOP | Facility: HOSPITAL | Age: 46
End: 2021-03-15

## 2021-03-16 ENCOUNTER — ANESTHESIA (OUTPATIENT)
Dept: PERIOP | Facility: HOSPITAL | Age: 46
End: 2021-03-16

## 2021-03-16 ENCOUNTER — HOSPITAL ENCOUNTER (OUTPATIENT)
Facility: HOSPITAL | Age: 46
Setting detail: HOSPITAL OUTPATIENT SURGERY
Discharge: HOME | End: 2021-03-16
Attending: INTERNAL MEDICINE | Admitting: INTERNAL MEDICINE

## 2021-03-16 VITALS
TEMPERATURE: 98.1 F | RESPIRATION RATE: 18 BRPM | WEIGHT: 210 LBS | HEART RATE: 60 BPM | DIASTOLIC BLOOD PRESSURE: 58 MMHG | SYSTOLIC BLOOD PRESSURE: 100 MMHG | BODY MASS INDEX: 31.1 KG/M2 | OXYGEN SATURATION: 97 % | HEIGHT: 69 IN

## 2021-03-16 PROCEDURE — 45378 DIAGNOSTIC COLONOSCOPY: CPT | Performed by: INTERNAL MEDICINE

## 2021-03-16 PROCEDURE — 25010000003 LIDOCAINE 1 % SOLUTION: Performed by: NURSE ANESTHETIST, CERTIFIED REGISTERED

## 2021-03-16 PROCEDURE — 25010000002 PROPOFOL 10 MG/ML EMULSION: Performed by: NURSE ANESTHETIST, CERTIFIED REGISTERED

## 2021-03-16 PROCEDURE — 25010000002 MIDAZOLAM PER 1 MG: Performed by: ANESTHESIOLOGY

## 2021-03-16 RX ORDER — DROPERIDOL 2.5 MG/ML
0.62 INJECTION, SOLUTION INTRAMUSCULAR; INTRAVENOUS ONCE AS NEEDED
Status: DISCONTINUED | OUTPATIENT
Start: 2021-03-16 | End: 2021-03-16 | Stop reason: HOSPADM

## 2021-03-16 RX ORDER — KETOROLAC TROMETHAMINE 30 MG/ML
30 INJECTION, SOLUTION INTRAMUSCULAR; INTRAVENOUS EVERY 6 HOURS PRN
Status: DISCONTINUED | OUTPATIENT
Start: 2021-03-16 | End: 2021-03-16 | Stop reason: HOSPADM

## 2021-03-16 RX ORDER — OXYCODONE HYDROCHLORIDE AND ACETAMINOPHEN 5; 325 MG/1; MG/1
1 TABLET ORAL ONCE AS NEEDED
Status: DISCONTINUED | OUTPATIENT
Start: 2021-03-16 | End: 2021-03-16 | Stop reason: HOSPADM

## 2021-03-16 RX ORDER — MIDAZOLAM HYDROCHLORIDE 1 MG/ML
1 INJECTION INTRAMUSCULAR; INTRAVENOUS
Status: DISCONTINUED | OUTPATIENT
Start: 2021-03-16 | End: 2021-03-16 | Stop reason: HOSPADM

## 2021-03-16 RX ORDER — ONDANSETRON 2 MG/ML
4 INJECTION INTRAMUSCULAR; INTRAVENOUS AS NEEDED
Status: DISCONTINUED | OUTPATIENT
Start: 2021-03-16 | End: 2021-03-16 | Stop reason: HOSPADM

## 2021-03-16 RX ORDER — MEPERIDINE HYDROCHLORIDE 25 MG/ML
12.5 INJECTION INTRAMUSCULAR; INTRAVENOUS; SUBCUTANEOUS
Status: DISCONTINUED | OUTPATIENT
Start: 2021-03-16 | End: 2021-03-16 | Stop reason: HOSPADM

## 2021-03-16 RX ORDER — SODIUM CHLORIDE 0.9 % (FLUSH) 0.9 %
10 SYRINGE (ML) INJECTION AS NEEDED
Status: DISCONTINUED | OUTPATIENT
Start: 2021-03-16 | End: 2021-03-16 | Stop reason: HOSPADM

## 2021-03-16 RX ORDER — HYDROCORTISONE ACETATE 25 MG/1
25 SUPPOSITORY RECTAL 2 TIMES DAILY PRN
Qty: 30 SUPPOSITORY | Refills: 0 | Status: SHIPPED | OUTPATIENT
Start: 2021-03-16 | End: 2022-08-30

## 2021-03-16 RX ORDER — SODIUM CHLORIDE 0.9 % (FLUSH) 0.9 %
10 SYRINGE (ML) INJECTION EVERY 12 HOURS SCHEDULED
Status: DISCONTINUED | OUTPATIENT
Start: 2021-03-16 | End: 2021-03-16 | Stop reason: HOSPADM

## 2021-03-16 RX ORDER — MIDAZOLAM HYDROCHLORIDE 1 MG/ML
2 INJECTION INTRAMUSCULAR; INTRAVENOUS
Status: DISCONTINUED | OUTPATIENT
Start: 2021-03-16 | End: 2021-03-16 | Stop reason: HOSPADM

## 2021-03-16 RX ORDER — LIDOCAINE HYDROCHLORIDE 10 MG/ML
INJECTION, SOLUTION INFILTRATION; PERINEURAL AS NEEDED
Status: DISCONTINUED | OUTPATIENT
Start: 2021-03-16 | End: 2021-03-16 | Stop reason: SURG

## 2021-03-16 RX ORDER — FENTANYL CITRATE 50 UG/ML
50 INJECTION, SOLUTION INTRAMUSCULAR; INTRAVENOUS
Status: DISCONTINUED | OUTPATIENT
Start: 2021-03-16 | End: 2021-03-16 | Stop reason: HOSPADM

## 2021-03-16 RX ORDER — IPRATROPIUM BROMIDE AND ALBUTEROL SULFATE 2.5; .5 MG/3ML; MG/3ML
3 SOLUTION RESPIRATORY (INHALATION) ONCE AS NEEDED
Status: DISCONTINUED | OUTPATIENT
Start: 2021-03-16 | End: 2021-03-16 | Stop reason: HOSPADM

## 2021-03-16 RX ORDER — SODIUM CHLORIDE, SODIUM LACTATE, POTASSIUM CHLORIDE, CALCIUM CHLORIDE 600; 310; 30; 20 MG/100ML; MG/100ML; MG/100ML; MG/100ML
INJECTION, SOLUTION INTRAVENOUS CONTINUOUS PRN
Status: DISCONTINUED | OUTPATIENT
Start: 2021-03-16 | End: 2021-03-16 | Stop reason: SURG

## 2021-03-16 RX ORDER — SODIUM CHLORIDE, SODIUM LACTATE, POTASSIUM CHLORIDE, CALCIUM CHLORIDE 600; 310; 30; 20 MG/100ML; MG/100ML; MG/100ML; MG/100ML
125 INJECTION, SOLUTION INTRAVENOUS ONCE
Status: DISCONTINUED | OUTPATIENT
Start: 2021-03-16 | End: 2021-03-16 | Stop reason: HOSPADM

## 2021-03-16 RX ORDER — SODIUM CHLORIDE, SODIUM LACTATE, POTASSIUM CHLORIDE, CALCIUM CHLORIDE 600; 310; 30; 20 MG/100ML; MG/100ML; MG/100ML; MG/100ML
100 INJECTION, SOLUTION INTRAVENOUS ONCE AS NEEDED
Status: DISCONTINUED | OUTPATIENT
Start: 2021-03-16 | End: 2021-03-16 | Stop reason: HOSPADM

## 2021-03-16 RX ORDER — PROPOFOL 10 MG/ML
VIAL (ML) INTRAVENOUS CONTINUOUS PRN
Status: DISCONTINUED | OUTPATIENT
Start: 2021-03-16 | End: 2021-03-16 | Stop reason: SURG

## 2021-03-16 RX ADMIN — MIDAZOLAM HYDROCHLORIDE 2 MG: 1 INJECTION, SOLUTION INTRAMUSCULAR; INTRAVENOUS at 07:55

## 2021-03-16 RX ADMIN — SODIUM CHLORIDE, POTASSIUM CHLORIDE, SODIUM LACTATE AND CALCIUM CHLORIDE: 600; 310; 30; 20 INJECTION, SOLUTION INTRAVENOUS at 07:44

## 2021-03-16 RX ADMIN — PROPOFOL 50 MCG/KG/MIN: 10 INJECTION, EMULSION INTRAVENOUS at 07:55

## 2021-03-16 RX ADMIN — LIDOCAINE HYDROCHLORIDE 50 MG: 10 INJECTION, SOLUTION INFILTRATION; PERINEURAL at 07:55

## 2021-03-16 NOTE — OP NOTE
COLONOSCOPY PROCEDURE NOTE    Francois Griffin  3/16/2021    GASTROENTEROLOGIST:  Mary Cosme MD      PRE-PROCEDURE DIAGNOSIS:   BRBPR (bright red blood per rectum) [K62.5]    POST-PROCEDURE DIAGNOSIS:  1.  Internal hemorrhoids    PROCEDURE:  COLONOSCOPY      ANESTHESIA:  Propofol administered by anesthesia.  See anesthesia notes for ASA classification    STAFF  Circulator: Rachana Santiago RN  Endo Technician: Kal Fletcher    Findings:  1.  Normal-appearing colonic mucosa.  2.  Normal terminal ileum.  3.  Small internal hemorrhoids.    OPERATIVE PROCEDURE   After proper informed consent was obtained, the patient was taken the operating suite and placed in left lateral decubitus position.  An Olympus video colonoscope 180 series was inserted in the rectum and advanced to the terminal ileum under direct visualization.  Cecum and terminal ileum were identified by visualization of the appendiceal orifice and ileocecal valve.  The colonoscope was then slowly withdrawn from the cecum to the rectum and passed a second time from rectum to cecum.  The colonoscope was retroflexed in the cecum and rectum. Scope was then withdrawn. Patient tolerated the procedure well. There were no immediate complications. Cecal withdrawal time was 8 minutes.    ESTIMATED BLOOD LOSS  None     COMPLICATIONS  None    RECOMMENDATIONS:  1.  Begin Anusol HC suppositories daily as needed for rectal bleeding.  2.  FiberCon caplets 2 caplets once a day with 8 ounces of fluid.  3.  Repeat colonoscopy in 10 years.    Mary Cosme MD  03/16/21 08:03 EDT

## 2021-03-16 NOTE — ANESTHESIA POSTPROCEDURE EVALUATION
Patient: Francois Griffin    Procedure Summary     Date: 03/16/21 Room / Location:  [facility]  /  [facility]    Anesthesia Start: 0744 Anesthesia Stop: 0805    Procedure: COLONOSCOPY WITH BIOPSY (N/A ) Diagnosis:       BRBPR (bright red blood per rectum)      (BRBPR (bright red blood per rectum) [K62.5])    Surgeons: Mary Cosme MD Provider: Sarah Staton CRNA    Anesthesia Type: general ASA Status: 2          Anesthesia Type: general    Vitals  Vitals Value Taken Time   /58 03/16/21 0836   Temp 98.1 °F (36.7 °C) 03/16/21 0806   Pulse 60 03/16/21 0836   Resp 18 03/16/21 0836   SpO2 97 % 03/16/21 0836           Post Anesthesia Care and Evaluation    Patient location during evaluation: PHASE II  Patient participation: complete - patient participated  Level of consciousness: awake and alert  Pain score: 0  Pain management: adequate  Airway patency: patent  Anesthetic complications: No anesthetic complications    Cardiovascular status: acceptable  Respiratory status: acceptable  Hydration status: acceptable

## 2021-03-16 NOTE — ANESTHESIA PREPROCEDURE EVALUATION
Anesthesia Evaluation     Patient summary reviewed and Nursing notes reviewed   history of anesthetic complications: PONV  NPO Solid Status: > 8 hours  NPO Liquid Status: > 8 hours           Airway   Mallampati: II  TM distance: >3 FB  Neck ROM: full  no difficulty expected  Dental - normal exam     Pulmonary - negative pulmonary ROS and normal exam   (-) asthma, not a smoker  Cardiovascular - normal exam  Exercise tolerance: good (4-7 METS)    NYHA Classification: II  Patient on routine beta blocker and Beta blocker given within 24 hours of surgery    (+) hypertension, hyperlipidemia,   (-) past MI, dysrhythmias, CHF      Neuro/Psych- negative ROS  GI/Hepatic/Renal/Endo    (+) obesity,  GERD,  renal disease stones,     Musculoskeletal (-) negative ROS    Abdominal  - normal exam    Bowel sounds: normal.   Substance History - negative use     OB/GYN negative ob/gyn ROS         Other - negative ROS                         Anesthesia Plan    ASA 2     general     intravenous induction     Anesthetic plan, all risks, benefits, and alternatives have been provided, discussed and informed consent has been obtained with: patient.  Use of blood products discussed with patient  Consented to blood products.

## 2021-03-18 ENCOUNTER — BULK ORDERING (OUTPATIENT)
Dept: CASE MANAGEMENT | Facility: OTHER | Age: 46
End: 2021-03-18

## 2021-03-18 DIAGNOSIS — Z23 IMMUNIZATION DUE: ICD-10-CM

## 2021-06-10 ENCOUNTER — OFFICE VISIT (OUTPATIENT)
Dept: FAMILY MEDICINE CLINIC | Facility: CLINIC | Age: 46
End: 2021-06-10

## 2021-06-10 VITALS
SYSTOLIC BLOOD PRESSURE: 126 MMHG | DIASTOLIC BLOOD PRESSURE: 80 MMHG | BODY MASS INDEX: 31.52 KG/M2 | WEIGHT: 212.8 LBS | OXYGEN SATURATION: 98 % | HEART RATE: 72 BPM | TEMPERATURE: 96.9 F | HEIGHT: 69 IN

## 2021-06-10 DIAGNOSIS — I10 ESSENTIAL HYPERTENSION: ICD-10-CM

## 2021-06-10 PROCEDURE — 99213 OFFICE O/P EST LOW 20 MIN: CPT | Performed by: FAMILY MEDICINE

## 2021-06-10 RX ORDER — ATENOLOL 50 MG/1
50 TABLET ORAL 2 TIMES DAILY
Qty: 180 TABLET | Refills: 1 | Status: SHIPPED | OUTPATIENT
Start: 2021-06-10 | End: 2022-11-10 | Stop reason: SDUPTHER

## 2021-06-10 NOTE — PROGRESS NOTES
"Francois Griffin     VITALS: Blood pressure 126/80, pulse 72, temperature 96.9 °F (36.1 °C), height 175.3 cm (69.02\"), weight 96.5 kg (212 lb 12.8 oz), SpO2 98 %.    Subjective  Chief Complaint  Hypertension    Subjective          History of Present Illness:  Patient is a 45 y.o.  male with medical conditions significant for hypertension who presents to clinic secondary to medical followup.     Since his last visit, he has been see by GI secondary to rectal bleeding and had been diagnosed with internal hemorrhage, Otherwise, his colonoscopy done in 03/20201 was clean and a repeat colonoscopy has been suggested for 10 years. He also had a recent lipid panel done in 03/2021, which showed his cholesterol to be borderline. His cardiac risk score is 2.8 percent.     He states that he has had a couple bouts of bad rectal bleeding. He is utilizing the suppositories as needed. He reports that he has been working on lowering his cholesterol.     No complaints about any of the medications.    The following portions of the patient's history were reviewed and updated as appropriate: allergies, current medications, past family history, past medical history, past social history, past surgical history and problem list.    Past Medical History  Past Medical History:   Diagnosis Date   • Elevated cholesterol    • Hyperlipidemia    • Hypertension    • Kidney stone    • PONV (postoperative nausea and vomiting)        Surgical History  Past Surgical History:   Procedure Laterality Date   • APPENDECTOMY     • CHOLECYSTECTOMY     • COLONOSCOPY N/A 3/16/2021    Procedure: COLONOSCOPY WITH BIOPSY;  Surgeon: Mary Cosme MD;  Location: [facility];  Service: Gastroenterology;  Laterality: N/A;   • EXTRACORPOREAL SHOCK WAVE LITHOTRIPSY (ESWL) Right 10/12/2018    Procedure: EXTRACORPOREAL SHOCKWAVE LITHOTRIPSY;  Surgeon: Elvin Whitley MD;  Location: [facility];  Service: Urology   • VOCAL CORD BIOPSY         Family " "History  Family History   Problem Relation Age of Onset   • Hypertension Father    • Arthritis Father    • Heart disease Mother    • Hypertension Mother    • Diabetes Mother    • Cancer Maternal Grandmother    • Cancer Maternal Grandfather    • Stroke Maternal Grandfather    • Cancer Paternal Grandfather        Social History  Social History     Socioeconomic History   • Marital status:      Spouse name: Not on file   • Number of children: Not on file   • Years of education: Not on file   • Highest education level: Not on file   Tobacco Use   • Smoking status: Never Smoker   • Smokeless tobacco: Never Used   Substance and Sexual Activity   • Alcohol use: No   • Drug use: No   • Sexual activity: Defer       Objective   Vital Signs:   /80 (BP Location: Left arm, Patient Position: Sitting, Cuff Size: Adult)   Pulse 72   Temp 96.9 °F (36.1 °C)   Ht 175.3 cm (69.02\")   Wt 96.5 kg (212 lb 12.8 oz)   SpO2 98%   BMI 31.41 kg/m²     Physical Exam     Gen: Patient in NAD. Pleasant and answers appropriately. A&Ox3.    Skin: Warm and dry with normal turgor. No purpura, rashes, or unusual pigmentation noted. Hair is normal in appearance and distribution.    HEENT: NC/AT. No lesions noted. Conjunctiva clear, sclera nonicteric. PERRL. EOMI without nystagmus or strabismus. Fundi appear benign. No hemorrhages or exudates of eyes. Auditory canals are patent bilaterally without lesions. TMs intact,  nonerythematous, nonbulging without lesions. Nasal mucosa pink, nonerythematous, and nonedematous. Frontal and maxillary sinuses are nontender. O/P nonerythematous and moist without exudate.    Neck: Supple without lymph nodes palpated. FROM.     Lungs: CTA B/L without rales, rhonchi, crackles, or wheezes.    Heart: RRR. S1 and S2 normal. No S3 or S4. No MRGT.    Abd: Soft, nontender,nondistended. (+)BSx4 quadrants.     Extrem: No CCE. Radial pulses 2+/4 and equal B/L. FROMx4. No bone, joint, or muscle tenderness " noted.    Neuro: No focal motor/sensory deficits.    Procedures         Assessment and Plan    Francois Griffin is a 45 y.o. here for medical followup.    Problem List Items Addressed This Visit     None      Visit Diagnoses     Essential hypertension        Relevant Medications    atenolol (TENORMIN) 50 MG tablet  He will continue working on lowering his cholesterol.  Blood pressures within normal limits.  Continue medications.            Patient's Body mass index is 31.41 kg/m². indicating that he is obese (BMI >30). Obesity-related health conditions include the following: hypertension and dyslipidemias. Obesity is unchanged. BMI is is above average; BMI management plan is completed. We discussed portion control and increasing exercise..              Follow Up   Return in about 6 months (around 12/10/2021).  Findings and plans discussed with patient who verbalizes understanding and agreement. Will followup with patient once results are in. Patient was given instructions and counseling regarding his condition or for health maintenance advice. Please see specific information pulled into the AVS if appropriate.       Scribed for Holly Rodriguez MD by Peter Medina.  06/10/21   09:35 EDT    I have personally performed the services described in this document as scribed by the above individual, and it is both accurate and complete.  Holly Rodriguez MD  6/28/2021  07:23 EDT

## 2021-07-13 ENCOUNTER — TRANSCRIBE ORDERS (OUTPATIENT)
Dept: ADMINISTRATIVE | Facility: HOSPITAL | Age: 46
End: 2021-07-13

## 2021-07-13 ENCOUNTER — LAB (OUTPATIENT)
Dept: LAB | Facility: HOSPITAL | Age: 46
End: 2021-07-13

## 2021-07-13 ENCOUNTER — TELEPHONE (OUTPATIENT)
Dept: FAMILY MEDICINE CLINIC | Facility: CLINIC | Age: 46
End: 2021-07-13

## 2021-07-13 DIAGNOSIS — Z01.818 OTHER SPECIFIED PRE-OPERATIVE EXAMINATION: Primary | ICD-10-CM

## 2021-07-13 DIAGNOSIS — Z01.818 OTHER SPECIFIED PRE-OPERATIVE EXAMINATION: ICD-10-CM

## 2021-07-13 PROBLEM — U07.1 COVID-19: Status: ACTIVE | Noted: 2021-07-13

## 2021-07-13 LAB — SARS-COV-2 RNA RESP QL NAA+PROBE: DETECTED

## 2021-07-13 PROCEDURE — C9803 HOPD COVID-19 SPEC COLLECT: HCPCS | Performed by: INTERNAL MEDICINE

## 2021-07-13 PROCEDURE — U0003 INFECTIOUS AGENT DETECTION BY NUCLEIC ACID (DNA OR RNA); SEVERE ACUTE RESPIRATORY SYNDROME CORONAVIRUS 2 (SARS-COV-2) (CORONAVIRUS DISEASE [COVID-19]), AMPLIFIED PROBE TECHNIQUE, MAKING USE OF HIGH THROUGHPUT TECHNOLOGIES AS DESCRIBED BY CMS-2020-01-R: HCPCS | Performed by: INTERNAL MEDICINE

## 2021-07-13 RX ORDER — SODIUM CHLORIDE 9 MG/ML
250 INJECTION, SOLUTION INTRAVENOUS ONCE
Status: CANCELLED | OUTPATIENT
Start: 2021-07-14

## 2021-07-13 RX ORDER — DIPHENHYDRAMINE HYDROCHLORIDE 50 MG/ML
50 INJECTION INTRAMUSCULAR; INTRAVENOUS AS NEEDED
Status: CANCELLED | OUTPATIENT
Start: 2021-07-14

## 2021-07-13 RX ORDER — EPINEPHRINE 1 MG/ML
0.3 INJECTION, SOLUTION INTRAMUSCULAR; SUBCUTANEOUS AS NEEDED
Status: CANCELLED | OUTPATIENT
Start: 2021-07-14

## 2021-07-13 RX ORDER — METHYLPREDNISOLONE SODIUM SUCCINATE 125 MG/2ML
125 INJECTION, POWDER, LYOPHILIZED, FOR SOLUTION INTRAMUSCULAR; INTRAVENOUS AS NEEDED
Status: CANCELLED | OUTPATIENT
Start: 2021-07-14

## 2021-07-13 NOTE — TELEPHONE ENCOUNTER
Please call to set up sotrovimab/casirivimab treatment.     Please let him know that if family develops symptoms, we can put in orders to get tested. Thanks.

## 2021-07-13 NOTE — TELEPHONE ENCOUNTER
Please call to set up sotrovimab/casirivimab treatment.     Please let him know that if family develops symptoms, we can put in orders to get tested. Thanks.      Spoke with Asher & scheduled patient for 7/14/2021 @ 7:30 am,he was notified,verbalized understanding,orders Faxed.

## 2021-07-13 NOTE — TELEPHONE ENCOUNTER
----- Message from Holly Rodriguez MD sent at 7/13/2021  9:47 AM EDT -----  Please call Francois (Marcial).   I have a positive COVID on him. (He's a [employer] employee). Is he somewhere? Does he need to be hooked up with the antiviral treatments (we don't have BAM anymore - it is something else).    Please let him know that he needs to isolate, Vitamin C, Vitamin D, Zinc, Baby aspirin. Sleep on stomach. How bad is it? What is his O2 sats? Does his wife and daughter need to be tested? Felicitas might (his daughter). I saw her for OM (thought from allergies) last week, but what if it is COVID?     Left a message to return call.    Spoke with patient he reports he is bad! Bad bad case of flu symptoms,body aches,HA,SOB,fevers 103 last HS,101 today,would really like to have the antiviral treatment,does not have a pulse ix but his wife is going to pick one up today,he reports he is isolated & his wife & daughter are fine with no symptoms.

## 2021-07-14 ENCOUNTER — HOSPITAL ENCOUNTER (OUTPATIENT)
Dept: INFUSION THERAPY | Facility: HOSPITAL | Age: 46
Discharge: HOME | End: 2021-07-14
Admitting: FAMILY MEDICINE

## 2021-07-14 VITALS
RESPIRATION RATE: 18 BRPM | SYSTOLIC BLOOD PRESSURE: 127 MMHG | DIASTOLIC BLOOD PRESSURE: 88 MMHG | TEMPERATURE: 97.8 F | HEART RATE: 74 BPM | OXYGEN SATURATION: 100 %

## 2021-07-14 DIAGNOSIS — U07.1 COVID-19: Primary | ICD-10-CM

## 2021-07-14 PROCEDURE — 25010000006 INJECTION, CASIRIVIMAB AND IMDEVIMAB, 1200 MG: Performed by: FAMILY MEDICINE

## 2021-07-14 PROCEDURE — M0243 CASIRIVI AND IMDEVI INFUSION: HCPCS | Performed by: FAMILY MEDICINE

## 2021-07-14 RX ORDER — METHYLPREDNISOLONE SODIUM SUCCINATE 125 MG/2ML
125 INJECTION, POWDER, LYOPHILIZED, FOR SOLUTION INTRAMUSCULAR; INTRAVENOUS AS NEEDED
Status: CANCELLED | OUTPATIENT
Start: 2021-07-14

## 2021-07-14 RX ORDER — METHYLPREDNISOLONE SODIUM SUCCINATE 125 MG/2ML
125 INJECTION, POWDER, LYOPHILIZED, FOR SOLUTION INTRAMUSCULAR; INTRAVENOUS AS NEEDED
Status: DISCONTINUED | OUTPATIENT
Start: 2021-07-14 | End: 2021-07-16 | Stop reason: HOSPADM

## 2021-07-14 RX ORDER — EPINEPHRINE 1 MG/ML
0.3 INJECTION, SOLUTION INTRAMUSCULAR; SUBCUTANEOUS AS NEEDED
Status: DISCONTINUED | OUTPATIENT
Start: 2021-07-14 | End: 2021-07-16 | Stop reason: HOSPADM

## 2021-07-14 RX ORDER — DIPHENHYDRAMINE HYDROCHLORIDE 50 MG/ML
50 INJECTION INTRAMUSCULAR; INTRAVENOUS AS NEEDED
Status: DISCONTINUED | OUTPATIENT
Start: 2021-07-14 | End: 2021-07-16 | Stop reason: HOSPADM

## 2021-07-14 RX ORDER — SODIUM CHLORIDE 9 MG/ML
250 INJECTION, SOLUTION INTRAVENOUS ONCE
Status: COMPLETED | OUTPATIENT
Start: 2021-07-14 | End: 2021-07-14

## 2021-07-14 RX ORDER — EPINEPHRINE 1 MG/ML
0.3 INJECTION, SOLUTION INTRAMUSCULAR; SUBCUTANEOUS AS NEEDED
Status: CANCELLED | OUTPATIENT
Start: 2021-07-14

## 2021-07-14 RX ORDER — DIPHENHYDRAMINE HYDROCHLORIDE 50 MG/ML
50 INJECTION INTRAMUSCULAR; INTRAVENOUS AS NEEDED
Status: CANCELLED | OUTPATIENT
Start: 2021-07-14

## 2021-07-14 RX ORDER — SODIUM CHLORIDE 9 MG/ML
250 INJECTION, SOLUTION INTRAVENOUS ONCE
Status: CANCELLED | OUTPATIENT
Start: 2021-07-14

## 2021-07-14 RX ADMIN — CASIRIVIMAB AND IMDEVIMAB: 600; 600 INJECTION, SOLUTION, CONCENTRATE INTRAVENOUS at 08:00

## 2021-07-14 RX ADMIN — SODIUM CHLORIDE 250 ML: 9 INJECTION, SOLUTION INTRAVENOUS at 08:00

## 2021-07-14 NOTE — NURSING NOTE
The FDA has issued an Emergency Use Authorization (EUA) to permit emergency use of the unapproved product bamlanivimab for treatment of laboratory confirmed COVID-19 in certain ambulatory patients. There is no prescribing information or package insert, however, the FDA has authorized distribution of Fact Sheets for patients and/or caregivers for additional information on this investigational therapy. I have provided the Fact Sheet to and discussed the following with the patient and he/she agreed to proceed:  • FDA has authorized the emergency use (EUA) of bamlanivimab, which is not an FDA approved drug.  • The patient has the option to accept or refuse bamlanivimab at any time.  • The significant known and potential risks and benefits of bamlanivimab and the extent to which such risks and benefits are unknown.  • Information on available alternative treatments and the risks and benefits of those alternatives have been shared.

## 2021-07-16 ENCOUNTER — TELEPHONE (OUTPATIENT)
Dept: FAMILY MEDICINE CLINIC | Facility: CLINIC | Age: 46
End: 2021-07-16

## 2021-07-16 NOTE — TELEPHONE ENCOUNTER
----- Message from Holly Rodriguez MD sent at 7/16/2021  2:50 AM EDT -----  Please call Francois. I need to fill out his FMLA. How much time is he asking off? 2 weeks? (in other words, what is [employer] giving him)?    Spoke with patient,he reports he will be off x 10 days,return to work date is 7/23/2021.

## 2021-09-13 ENCOUNTER — TELEPHONE (OUTPATIENT)
Dept: FAMILY MEDICINE CLINIC | Facility: CLINIC | Age: 46
End: 2021-09-13

## 2021-09-13 NOTE — TELEPHONE ENCOUNTER
PATIENT HAS PAIN IN HIS RIGHT LEG AND HE WOULD LIKE TO KNOW IF HIS PCP CAN ORDER AN ULTRASOUND FOR HIM.    CONTACT: [phone]

## 2021-09-14 ENCOUNTER — HOSPITAL ENCOUNTER (OUTPATIENT)
Dept: CARDIOLOGY | Facility: HOSPITAL | Age: 46
Discharge: HOME | End: 2021-09-14
Admitting: FAMILY MEDICINE

## 2021-09-14 DIAGNOSIS — M79.604 RIGHT LEG PAIN: ICD-10-CM

## 2021-09-14 DIAGNOSIS — M79.604 RIGHT LEG PAIN: Primary | ICD-10-CM

## 2021-09-14 PROCEDURE — 93971 EXTREMITY STUDY: CPT | Performed by: RADIOLOGY

## 2021-09-14 PROCEDURE — 93971 EXTREMITY STUDY: CPT

## 2021-09-14 NOTE — TELEPHONE ENCOUNTER
Please advise. Patient complains of leg burning, swelling, stinging.  He would like to have a venous doppler to rule out blood clot asap.  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

## 2021-09-17 ENCOUNTER — TELEPHONE (OUTPATIENT)
Dept: FAMILY MEDICINE CLINIC | Facility: CLINIC | Age: 46
End: 2021-09-17

## 2021-09-17 NOTE — TELEPHONE ENCOUNTER
----- Message from Holly Rodriguez MD sent at 9/17/2021 12:58 AM EDT -----  Please call. U/S was negative. How is his leg? Is it cellulitis? Does he need it looked at?

## 2021-09-17 NOTE — TELEPHONE ENCOUNTER
Called pt and advised. Pt stated the swelling is much better, no pain, no redness, not weeping. Pt stated he is doing well.

## 2021-12-31 DIAGNOSIS — N20.1 URETERAL CALCULUS: Primary | ICD-10-CM

## 2021-12-31 RX ORDER — HYDROCODONE BITARTRATE AND ACETAMINOPHEN 10; 325 MG/1; MG/1
1 TABLET ORAL EVERY 6 HOURS PRN
Qty: 10 TABLET | Refills: 0 | Status: SHIPPED | OUTPATIENT
Start: 2021-12-31 | End: 2022-06-20 | Stop reason: HOSPADM

## 2021-12-31 RX ORDER — TAMSULOSIN HYDROCHLORIDE 0.4 MG/1
1 CAPSULE ORAL NIGHTLY
Qty: 30 CAPSULE | Refills: 5 | Status: SHIPPED | OUTPATIENT
Start: 2021-12-31 | End: 2022-06-20 | Stop reason: HOSPADM

## 2021-12-31 RX ORDER — PROMETHAZINE HYDROCHLORIDE 25 MG/1
25 TABLET ORAL EVERY 6 HOURS PRN
Qty: 21 TABLET | Refills: 2 | Status: SHIPPED | OUTPATIENT
Start: 2021-12-31 | End: 2022-06-20 | Stop reason: HOSPADM

## 2021-12-31 RX ORDER — KETOROLAC TROMETHAMINE 10 MG/1
10 TABLET, FILM COATED ORAL EVERY 6 HOURS PRN
Qty: 16 TABLET | Refills: 2 | Status: SHIPPED | OUTPATIENT
Start: 2021-12-31 | End: 2022-06-20 | Stop reason: HOSPADM

## 2022-05-16 DIAGNOSIS — I10 ESSENTIAL HYPERTENSION: ICD-10-CM

## 2022-05-16 RX ORDER — ATENOLOL 50 MG/1
50 TABLET ORAL
Qty: 180 TABLET | Refills: 1 | OUTPATIENT
Start: 2022-05-16

## 2022-06-20 ENCOUNTER — ANESTHESIA (OUTPATIENT)
Dept: PERIOP | Facility: HOSPITAL | Age: 47
End: 2022-06-20

## 2022-06-20 ENCOUNTER — APPOINTMENT (OUTPATIENT)
Dept: CT IMAGING | Facility: HOSPITAL | Age: 47
End: 2022-06-20

## 2022-06-20 ENCOUNTER — HOSPITAL ENCOUNTER (OUTPATIENT)
Facility: HOSPITAL | Age: 47
Discharge: HOME | End: 2022-06-20
Attending: EMERGENCY MEDICINE | Admitting: UROLOGY

## 2022-06-20 ENCOUNTER — APPOINTMENT (OUTPATIENT)
Dept: GENERAL RADIOLOGY | Facility: HOSPITAL | Age: 47
End: 2022-06-20

## 2022-06-20 ENCOUNTER — ANESTHESIA EVENT (OUTPATIENT)
Dept: PERIOP | Facility: HOSPITAL | Age: 47
End: 2022-06-20

## 2022-06-20 VITALS
HEIGHT: 69 IN | OXYGEN SATURATION: 98 % | SYSTOLIC BLOOD PRESSURE: 132 MMHG | DIASTOLIC BLOOD PRESSURE: 82 MMHG | BODY MASS INDEX: 31.84 KG/M2 | RESPIRATION RATE: 14 BRPM | HEART RATE: 67 BPM | WEIGHT: 215 LBS | TEMPERATURE: 97.8 F

## 2022-06-20 DIAGNOSIS — N20.0 KIDNEY STONE: ICD-10-CM

## 2022-06-20 DIAGNOSIS — N20.1 LEFT URETERAL STONE: ICD-10-CM

## 2022-06-20 DIAGNOSIS — N20.1 RIGHT URETERAL STONE: Primary | ICD-10-CM

## 2022-06-20 LAB
ALBUMIN SERPL-MCNC: 4.12 G/DL (ref 3.5–5.2)
ALBUMIN/GLOB SERPL: 1.4 G/DL
ALP SERPL-CCNC: 88 U/L (ref 39–117)
ALT SERPL W P-5'-P-CCNC: 29 U/L (ref 1–41)
ANION GAP SERPL CALCULATED.3IONS-SCNC: 13.3 MMOL/L (ref 5–15)
AST SERPL-CCNC: 29 U/L (ref 1–40)
BASOPHILS # BLD AUTO: 0.07 10*3/MM3 (ref 0–0.2)
BASOPHILS NFR BLD AUTO: 0.9 % (ref 0–1.5)
BILIRUB SERPL-MCNC: 0.9 MG/DL (ref 0–1.2)
BUN SERPL-MCNC: 18 MG/DL (ref 6–20)
BUN/CREAT SERPL: 16.5 (ref 7–25)
CALCIUM SPEC-SCNC: 9.2 MG/DL (ref 8.6–10.5)
CHLORIDE SERPL-SCNC: 107 MMOL/L (ref 98–107)
CO2 SERPL-SCNC: 23.7 MMOL/L (ref 22–29)
CREAT SERPL-MCNC: 1.09 MG/DL (ref 0.76–1.27)
CRP SERPL-MCNC: 0.44 MG/DL (ref 0–0.5)
D-LACTATE SERPL-SCNC: 1.9 MMOL/L (ref 0.5–2)
DEPRECATED RDW RBC AUTO: 38.5 FL (ref 37–54)
EGFRCR SERPLBLD CKD-EPI 2021: 85.8 ML/MIN/1.73
EOSINOPHIL # BLD AUTO: 0.21 10*3/MM3 (ref 0–0.4)
EOSINOPHIL NFR BLD AUTO: 2.6 % (ref 0.3–6.2)
ERYTHROCYTE [DISTWIDTH] IN BLOOD BY AUTOMATED COUNT: 12.6 % (ref 12.3–15.4)
FLUAV SUBTYP SPEC NAA+PROBE: NOT DETECTED
FLUBV RNA ISLT QL NAA+PROBE: NOT DETECTED
GLOBULIN UR ELPH-MCNC: 3 GM/DL
GLUCOSE SERPL-MCNC: 109 MG/DL (ref 65–99)
HCT VFR BLD AUTO: 44.5 % (ref 37.5–51)
HGB BLD-MCNC: 15.4 G/DL (ref 13–17.7)
HOLD SPECIMEN: NORMAL
HOLD SPECIMEN: NORMAL
IMM GRANULOCYTES # BLD AUTO: 0.03 10*3/MM3 (ref 0–0.05)
IMM GRANULOCYTES NFR BLD AUTO: 0.4 % (ref 0–0.5)
LIPASE SERPL-CCNC: 55 U/L (ref 13–60)
LYMPHOCYTES # BLD AUTO: 3.52 10*3/MM3 (ref 0.7–3.1)
LYMPHOCYTES NFR BLD AUTO: 43.1 % (ref 19.6–45.3)
MCH RBC QN AUTO: 29.3 PG (ref 26.6–33)
MCHC RBC AUTO-ENTMCNC: 34.6 G/DL (ref 31.5–35.7)
MCV RBC AUTO: 84.8 FL (ref 79–97)
MONOCYTES # BLD AUTO: 0.72 10*3/MM3 (ref 0.1–0.9)
MONOCYTES NFR BLD AUTO: 8.8 % (ref 5–12)
NEUTROPHILS NFR BLD AUTO: 3.62 10*3/MM3 (ref 1.7–7)
NEUTROPHILS NFR BLD AUTO: 44.2 % (ref 42.7–76)
NRBC BLD AUTO-RTO: 0 /100 WBC (ref 0–0.2)
PLATELET # BLD AUTO: 340 10*3/MM3 (ref 140–450)
PMV BLD AUTO: 9.4 FL (ref 6–12)
POTASSIUM SERPL-SCNC: 4 MMOL/L (ref 3.5–5.2)
PROT SERPL-MCNC: 7.1 G/DL (ref 6–8.5)
RBC # BLD AUTO: 5.25 10*6/MM3 (ref 4.14–5.8)
SARS-COV-2 RNA PNL SPEC NAA+PROBE: NOT DETECTED
SODIUM SERPL-SCNC: 144 MMOL/L (ref 136–145)
TROPONIN T SERPL-MCNC: <0.01 NG/ML (ref 0–0.03)
WBC NRBC COR # BLD: 8.17 10*3/MM3 (ref 3.4–10.8)
WHOLE BLOOD HOLD COAG: NORMAL
WHOLE BLOOD HOLD SPECIMEN: NORMAL

## 2022-06-20 PROCEDURE — 25010000002 KETOROLAC TROMETHAMINE PER 15 MG: Performed by: NURSE ANESTHETIST, CERTIFIED REGISTERED

## 2022-06-20 PROCEDURE — 25010000002 MIDAZOLAM PER 1 MG: Performed by: NURSE ANESTHETIST, CERTIFIED REGISTERED

## 2022-06-20 PROCEDURE — 84484 ASSAY OF TROPONIN QUANT: CPT | Performed by: NURSE PRACTITIONER

## 2022-06-20 PROCEDURE — 83605 ASSAY OF LACTIC ACID: CPT | Performed by: NURSE PRACTITIONER

## 2022-06-20 PROCEDURE — 83690 ASSAY OF LIPASE: CPT | Performed by: NURSE PRACTITIONER

## 2022-06-20 PROCEDURE — 76000 FLUOROSCOPY <1 HR PHYS/QHP: CPT

## 2022-06-20 PROCEDURE — 99284 EMERGENCY DEPT VISIT MOD MDM: CPT

## 2022-06-20 PROCEDURE — 99204 OFFICE O/P NEW MOD 45 MIN: CPT | Performed by: UROLOGY

## 2022-06-20 PROCEDURE — 87636 SARSCOV2 & INF A&B AMP PRB: CPT | Performed by: NURSE PRACTITIONER

## 2022-06-20 PROCEDURE — 25010000002 PROPOFOL 10 MG/ML EMULSION: Performed by: NURSE ANESTHETIST, CERTIFIED REGISTERED

## 2022-06-20 PROCEDURE — 0 MEPERIDINE PER 100 MG: Performed by: NURSE ANESTHETIST, CERTIFIED REGISTERED

## 2022-06-20 PROCEDURE — 86140 C-REACTIVE PROTEIN: CPT | Performed by: NURSE PRACTITIONER

## 2022-06-20 PROCEDURE — 52332 CYSTOSCOPY AND TREATMENT: CPT | Performed by: UROLOGY

## 2022-06-20 PROCEDURE — 87086 URINE CULTURE/COLONY COUNT: CPT | Performed by: UROLOGY

## 2022-06-20 PROCEDURE — C9803 HOPD COVID-19 SPEC COLLECT: HCPCS

## 2022-06-20 PROCEDURE — 25010000002 GENTAMICIN PER 80 MG: Performed by: UROLOGY

## 2022-06-20 PROCEDURE — 52351 CYSTOURETERO & OR PYELOSCOPE: CPT | Performed by: UROLOGY

## 2022-06-20 PROCEDURE — 25010000002 FENTANYL CITRATE (PF) 50 MCG/ML SOLUTION: Performed by: NURSE ANESTHETIST, CERTIFIED REGISTERED

## 2022-06-20 PROCEDURE — 0 CEFAZOLIN PER 500 MG: Performed by: UROLOGY

## 2022-06-20 PROCEDURE — 25010000002 FENTANYL CITRATE (PF) 50 MCG/ML SOLUTION: Performed by: ANESTHESIOLOGY

## 2022-06-20 PROCEDURE — 76000 FLUOROSCOPY <1 HR PHYS/QHP: CPT | Performed by: RADIOLOGY

## 2022-06-20 PROCEDURE — C1769 GUIDE WIRE: HCPCS | Performed by: UROLOGY

## 2022-06-20 PROCEDURE — 96374 THER/PROPH/DIAG INJ IV PUSH: CPT

## 2022-06-20 PROCEDURE — 25010000002 ONDANSETRON PER 1 MG: Performed by: ANESTHESIOLOGY

## 2022-06-20 PROCEDURE — 96361 HYDRATE IV INFUSION ADD-ON: CPT

## 2022-06-20 PROCEDURE — 25010000002 DROPERIDOL PER 5 MG: Performed by: ANESTHESIOLOGY

## 2022-06-20 PROCEDURE — 25010000002 HYDROMORPHONE 1 MG/ML SOLUTION: Performed by: EMERGENCY MEDICINE

## 2022-06-20 PROCEDURE — 74176 CT ABD & PELVIS W/O CONTRAST: CPT

## 2022-06-20 PROCEDURE — C2617 STENT, NON-COR, TEM W/O DEL: HCPCS | Performed by: UROLOGY

## 2022-06-20 PROCEDURE — 25010000002 IOPAMIDOL 61 % SOLUTION: Performed by: UROLOGY

## 2022-06-20 PROCEDURE — 25010000002 HYDROMORPHONE 1 MG/ML SOLUTION: Performed by: NURSE PRACTITIONER

## 2022-06-20 PROCEDURE — C1758 CATHETER, URETERAL: HCPCS | Performed by: UROLOGY

## 2022-06-20 PROCEDURE — 96376 TX/PRO/DX INJ SAME DRUG ADON: CPT

## 2022-06-20 PROCEDURE — 96375 TX/PRO/DX INJ NEW DRUG ADDON: CPT

## 2022-06-20 PROCEDURE — 25010000002 ONDANSETRON PER 1 MG: Performed by: NURSE ANESTHETIST, CERTIFIED REGISTERED

## 2022-06-20 PROCEDURE — 25010000002 PROCHLORPERAZINE 10 MG/2ML SOLUTION: Performed by: NURSE PRACTITIONER

## 2022-06-20 PROCEDURE — 80053 COMPREHEN METABOLIC PANEL: CPT | Performed by: NURSE PRACTITIONER

## 2022-06-20 PROCEDURE — 25010000002 KETOROLAC TROMETHAMINE PER 15 MG: Performed by: NURSE PRACTITIONER

## 2022-06-20 PROCEDURE — 85025 COMPLETE CBC W/AUTO DIFF WBC: CPT | Performed by: NURSE PRACTITIONER

## 2022-06-20 PROCEDURE — 25010000002 ONDANSETRON PER 1 MG: Performed by: NURSE PRACTITIONER

## 2022-06-20 PROCEDURE — 25010000002 HYDROMORPHONE 1 MG/ML SOLUTION: Performed by: ANESTHESIOLOGY

## 2022-06-20 DEVICE — URETERAL STENT
Type: IMPLANTABLE DEVICE | Site: URETER | Status: NON-FUNCTIONAL
Brand: POLARIS™ ULTRA
Removed: 2022-06-27

## 2022-06-20 RX ORDER — FENTANYL CITRATE 50 UG/ML
INJECTION, SOLUTION INTRAMUSCULAR; INTRAVENOUS AS NEEDED
Status: DISCONTINUED | OUTPATIENT
Start: 2022-06-20 | End: 2022-06-20 | Stop reason: SURG

## 2022-06-20 RX ORDER — SODIUM CHLORIDE 9 MG/ML
75 INJECTION, SOLUTION INTRAVENOUS CONTINUOUS
Status: DISCONTINUED | OUTPATIENT
Start: 2022-06-20 | End: 2022-06-20 | Stop reason: HOSPADM

## 2022-06-20 RX ORDER — MIDAZOLAM HYDROCHLORIDE 1 MG/ML
INJECTION INTRAMUSCULAR; INTRAVENOUS AS NEEDED
Status: DISCONTINUED | OUTPATIENT
Start: 2022-06-20 | End: 2022-06-20 | Stop reason: SURG

## 2022-06-20 RX ORDER — PROCHLORPERAZINE EDISYLATE 5 MG/ML
5 INJECTION INTRAMUSCULAR; INTRAVENOUS ONCE
Status: COMPLETED | OUTPATIENT
Start: 2022-06-20 | End: 2022-06-20

## 2022-06-20 RX ORDER — IPRATROPIUM BROMIDE AND ALBUTEROL SULFATE 2.5; .5 MG/3ML; MG/3ML
3 SOLUTION RESPIRATORY (INHALATION) ONCE AS NEEDED
Status: DISCONTINUED | OUTPATIENT
Start: 2022-06-20 | End: 2022-06-20 | Stop reason: HOSPADM

## 2022-06-20 RX ORDER — SODIUM CHLORIDE, SODIUM LACTATE, POTASSIUM CHLORIDE, CALCIUM CHLORIDE 600; 310; 30; 20 MG/100ML; MG/100ML; MG/100ML; MG/100ML
100 INJECTION, SOLUTION INTRAVENOUS ONCE AS NEEDED
Status: DISCONTINUED | OUTPATIENT
Start: 2022-06-20 | End: 2022-06-20 | Stop reason: HOSPADM

## 2022-06-20 RX ORDER — DROPERIDOL 2.5 MG/ML
0.62 INJECTION, SOLUTION INTRAMUSCULAR; INTRAVENOUS ONCE
Status: DISCONTINUED | OUTPATIENT
Start: 2022-06-20 | End: 2022-06-20 | Stop reason: HOSPADM

## 2022-06-20 RX ORDER — PROPOFOL 10 MG/ML
VIAL (ML) INTRAVENOUS AS NEEDED
Status: DISCONTINUED | OUTPATIENT
Start: 2022-06-20 | End: 2022-06-20 | Stop reason: SURG

## 2022-06-20 RX ORDER — PHENAZOPYRIDINE HYDROCHLORIDE 200 MG/1
200 TABLET, FILM COATED ORAL 3 TIMES DAILY PRN
Qty: 20 TABLET | Refills: 0 | Status: SHIPPED | OUTPATIENT
Start: 2022-06-20 | End: 2022-08-30

## 2022-06-20 RX ORDER — ONDANSETRON 2 MG/ML
INJECTION INTRAMUSCULAR; INTRAVENOUS AS NEEDED
Status: DISCONTINUED | OUTPATIENT
Start: 2022-06-20 | End: 2022-06-20 | Stop reason: SURG

## 2022-06-20 RX ORDER — TAMSULOSIN HYDROCHLORIDE 0.4 MG/1
1 CAPSULE ORAL DAILY
Qty: 30 CAPSULE | Refills: 3 | Status: SHIPPED | OUTPATIENT
Start: 2022-06-20 | End: 2022-08-30

## 2022-06-20 RX ORDER — SODIUM CHLORIDE 0.9 % (FLUSH) 0.9 %
10 SYRINGE (ML) INJECTION AS NEEDED
Status: DISCONTINUED | OUTPATIENT
Start: 2022-06-20 | End: 2022-06-20 | Stop reason: HOSPADM

## 2022-06-20 RX ORDER — FAMOTIDINE 10 MG/ML
INJECTION, SOLUTION INTRAVENOUS AS NEEDED
Status: DISCONTINUED | OUTPATIENT
Start: 2022-06-20 | End: 2022-06-20 | Stop reason: SURG

## 2022-06-20 RX ORDER — ONDANSETRON 2 MG/ML
4 INJECTION INTRAMUSCULAR; INTRAVENOUS ONCE
Status: COMPLETED | OUTPATIENT
Start: 2022-06-20 | End: 2022-06-20

## 2022-06-20 RX ORDER — SODIUM CHLORIDE 0.9 % (FLUSH) 0.9 %
10 SYRINGE (ML) INJECTION EVERY 12 HOURS SCHEDULED
Status: DISCONTINUED | OUTPATIENT
Start: 2022-06-20 | End: 2022-06-20 | Stop reason: HOSPADM

## 2022-06-20 RX ORDER — FENTANYL CITRATE 50 UG/ML
50 INJECTION, SOLUTION INTRAMUSCULAR; INTRAVENOUS
Status: DISCONTINUED | OUTPATIENT
Start: 2022-06-20 | End: 2022-06-20 | Stop reason: HOSPADM

## 2022-06-20 RX ORDER — ATROPA BELLADONNA AND OPIUM 16.2; 3 MG/1; MG/1
SUPPOSITORY RECTAL AS NEEDED
Status: DISCONTINUED | OUTPATIENT
Start: 2022-06-20 | End: 2022-06-20 | Stop reason: HOSPADM

## 2022-06-20 RX ORDER — KETOROLAC TROMETHAMINE 10 MG/1
10 TABLET, FILM COATED ORAL EVERY 6 HOURS PRN
Qty: 20 TABLET | Refills: 0 | Status: SHIPPED | OUTPATIENT
Start: 2022-06-20 | End: 2022-08-30

## 2022-06-20 RX ORDER — ONDANSETRON 2 MG/ML
4 INJECTION INTRAMUSCULAR; INTRAVENOUS AS NEEDED
Status: DISCONTINUED | OUTPATIENT
Start: 2022-06-20 | End: 2022-06-20 | Stop reason: HOSPADM

## 2022-06-20 RX ORDER — ASPIRIN 81 MG/1
81 TABLET, CHEWABLE ORAL DAILY
COMMUNITY

## 2022-06-20 RX ORDER — MIDAZOLAM HYDROCHLORIDE 1 MG/ML
1 INJECTION INTRAMUSCULAR; INTRAVENOUS
Status: DISCONTINUED | OUTPATIENT
Start: 2022-06-20 | End: 2022-06-20 | Stop reason: HOSPADM

## 2022-06-20 RX ORDER — MEPERIDINE HYDROCHLORIDE 25 MG/ML
12.5 INJECTION INTRAMUSCULAR; INTRAVENOUS; SUBCUTANEOUS
Status: COMPLETED | OUTPATIENT
Start: 2022-06-20 | End: 2022-06-20

## 2022-06-20 RX ORDER — DROPERIDOL 2.5 MG/ML
0.62 INJECTION, SOLUTION INTRAMUSCULAR; INTRAVENOUS ONCE
Status: COMPLETED | OUTPATIENT
Start: 2022-06-20 | End: 2022-06-20

## 2022-06-20 RX ORDER — KETOROLAC TROMETHAMINE 30 MG/ML
30 INJECTION, SOLUTION INTRAMUSCULAR; INTRAVENOUS ONCE
Status: COMPLETED | OUTPATIENT
Start: 2022-06-20 | End: 2022-06-20

## 2022-06-20 RX ORDER — MAGNESIUM HYDROXIDE 1200 MG/15ML
LIQUID ORAL AS NEEDED
Status: DISCONTINUED | OUTPATIENT
Start: 2022-06-20 | End: 2022-06-20 | Stop reason: HOSPADM

## 2022-06-20 RX ORDER — KETOROLAC TROMETHAMINE 30 MG/ML
INJECTION, SOLUTION INTRAMUSCULAR; INTRAVENOUS AS NEEDED
Status: DISCONTINUED | OUTPATIENT
Start: 2022-06-20 | End: 2022-06-20 | Stop reason: SURG

## 2022-06-20 RX ORDER — CEFAZOLIN SODIUM 2 G/50ML
2 SOLUTION INTRAVENOUS ONCE
Status: DISCONTINUED | OUTPATIENT
Start: 2022-06-20 | End: 2022-06-20 | Stop reason: CLARIF

## 2022-06-20 RX ORDER — CEPHALEXIN 500 MG/1
500 CAPSULE ORAL 2 TIMES DAILY
Qty: 20 CAPSULE | Refills: 0 | Status: SHIPPED | OUTPATIENT
Start: 2022-06-20 | End: 2022-06-22

## 2022-06-20 RX ORDER — OXYCODONE HYDROCHLORIDE AND ACETAMINOPHEN 5; 325 MG/1; MG/1
1 TABLET ORAL ONCE AS NEEDED
Status: DISCONTINUED | OUTPATIENT
Start: 2022-06-20 | End: 2022-06-20 | Stop reason: HOSPADM

## 2022-06-20 RX ORDER — GENTAMICIN SULFATE 40 MG/ML
INJECTION, SOLUTION INTRAMUSCULAR; INTRAVENOUS AS NEEDED
Status: DISCONTINUED | OUTPATIENT
Start: 2022-06-20 | End: 2022-06-20 | Stop reason: HOSPADM

## 2022-06-20 RX ORDER — KETOROLAC TROMETHAMINE 30 MG/ML
30 INJECTION, SOLUTION INTRAMUSCULAR; INTRAVENOUS EVERY 6 HOURS PRN
Status: DISCONTINUED | OUTPATIENT
Start: 2022-06-20 | End: 2022-06-20 | Stop reason: HOSPADM

## 2022-06-20 RX ORDER — SODIUM CHLORIDE, SODIUM LACTATE, POTASSIUM CHLORIDE, CALCIUM CHLORIDE 600; 310; 30; 20 MG/100ML; MG/100ML; MG/100ML; MG/100ML
125 INJECTION, SOLUTION INTRAVENOUS ONCE
Status: COMPLETED | OUTPATIENT
Start: 2022-06-20 | End: 2022-06-20

## 2022-06-20 RX ORDER — FENTANYL CITRATE 50 UG/ML
100 INJECTION, SOLUTION INTRAMUSCULAR; INTRAVENOUS ONCE
Status: COMPLETED | OUTPATIENT
Start: 2022-06-20 | End: 2022-06-20

## 2022-06-20 RX ORDER — SODIUM CHLORIDE, SODIUM LACTATE, POTASSIUM CHLORIDE, CALCIUM CHLORIDE 600; 310; 30; 20 MG/100ML; MG/100ML; MG/100ML; MG/100ML
INJECTION, SOLUTION INTRAVENOUS CONTINUOUS PRN
Status: DISCONTINUED | OUTPATIENT
Start: 2022-06-20 | End: 2022-06-20 | Stop reason: SURG

## 2022-06-20 RX ADMIN — HYDROMORPHONE HYDROCHLORIDE 1 MG: 1 INJECTION, SOLUTION INTRAMUSCULAR; INTRAVENOUS; SUBCUTANEOUS at 05:14

## 2022-06-20 RX ADMIN — FENTANYL CITRATE 50 MCG: 50 INJECTION INTRAMUSCULAR; INTRAVENOUS at 13:16

## 2022-06-20 RX ADMIN — ONDANSETRON 4 MG: 2 INJECTION INTRAMUSCULAR; INTRAVENOUS at 12:55

## 2022-06-20 RX ADMIN — MIDAZOLAM 2 MG: 1 INJECTION INTRAMUSCULAR; INTRAVENOUS at 12:09

## 2022-06-20 RX ADMIN — SODIUM CHLORIDE 75 ML/HR: 9 INJECTION, SOLUTION INTRAVENOUS at 07:30

## 2022-06-20 RX ADMIN — PROCHLORPERAZINE EDISYLATE 5 MG: 5 INJECTION INTRAMUSCULAR; INTRAVENOUS at 05:59

## 2022-06-20 RX ADMIN — HYDROMORPHONE HYDROCHLORIDE 1 MG: 1 INJECTION, SOLUTION INTRAMUSCULAR; INTRAVENOUS; SUBCUTANEOUS at 06:59

## 2022-06-20 RX ADMIN — ONDANSETRON 4 MG: 2 INJECTION INTRAMUSCULAR; INTRAVENOUS at 05:05

## 2022-06-20 RX ADMIN — HYDROMORPHONE HYDROCHLORIDE 1 MG: 1 INJECTION, SOLUTION INTRAMUSCULAR; INTRAVENOUS; SUBCUTANEOUS at 05:26

## 2022-06-20 RX ADMIN — HYDROMORPHONE HYDROCHLORIDE 1 MG: 1 INJECTION, SOLUTION INTRAMUSCULAR; INTRAVENOUS; SUBCUTANEOUS at 13:32

## 2022-06-20 RX ADMIN — ONDANSETRON 4 MG: 2 INJECTION INTRAMUSCULAR; INTRAVENOUS at 10:41

## 2022-06-20 RX ADMIN — FENTANYL CITRATE 50 MCG: 50 INJECTION INTRAMUSCULAR; INTRAVENOUS at 13:21

## 2022-06-20 RX ADMIN — MEPERIDINE HYDROCHLORIDE 12.5 MG: 25 INJECTION INTRAMUSCULAR; INTRAVENOUS; SUBCUTANEOUS at 13:25

## 2022-06-20 RX ADMIN — KETOROLAC TROMETHAMINE 30 MG: 30 INJECTION, SOLUTION INTRAMUSCULAR; INTRAVENOUS at 05:26

## 2022-06-20 RX ADMIN — DROPERIDOL 0.62 MG: 2.5 INJECTION, SOLUTION INTRAMUSCULAR; INTRAVENOUS at 13:45

## 2022-06-20 RX ADMIN — HYDROMORPHONE HYDROCHLORIDE 1 MG: 1 INJECTION, SOLUTION INTRAMUSCULAR; INTRAVENOUS; SUBCUTANEOUS at 09:20

## 2022-06-20 RX ADMIN — PROPOFOL 150 MG: 10 INJECTION, EMULSION INTRAVENOUS at 12:13

## 2022-06-20 RX ADMIN — FAMOTIDINE 20 MG: 10 INJECTION INTRAVENOUS at 12:09

## 2022-06-20 RX ADMIN — CEFAZOLIN 100 ML: 1 INJECTION, POWDER, FOR SOLUTION INTRAVENOUS at 12:19

## 2022-06-20 RX ADMIN — KETOROLAC TROMETHAMINE 30 MG: 30 INJECTION, SOLUTION INTRAMUSCULAR at 12:52

## 2022-06-20 RX ADMIN — SODIUM CHLORIDE, POTASSIUM CHLORIDE, SODIUM LACTATE AND CALCIUM CHLORIDE: 600; 310; 30; 20 INJECTION, SOLUTION INTRAVENOUS at 12:09

## 2022-06-20 RX ADMIN — FENTANYL CITRATE 50 MCG: 50 INJECTION INTRAMUSCULAR; INTRAVENOUS at 12:33

## 2022-06-20 RX ADMIN — FENTANYL CITRATE 50 MCG: 50 INJECTION INTRAMUSCULAR; INTRAVENOUS at 12:25

## 2022-06-20 RX ADMIN — FENTANYL CITRATE 100 MCG: 50 INJECTION INTRAMUSCULAR; INTRAVENOUS at 10:59

## 2022-06-20 RX ADMIN — SODIUM CHLORIDE, POTASSIUM CHLORIDE, SODIUM LACTATE AND CALCIUM CHLORIDE 125 ML/HR: 600; 310; 30; 20 INJECTION, SOLUTION INTRAVENOUS at 10:41

## 2022-06-20 RX ADMIN — SODIUM CHLORIDE 1000 ML: 9 INJECTION, SOLUTION INTRAVENOUS at 05:14

## 2022-06-20 RX ADMIN — MEPERIDINE HYDROCHLORIDE 12.5 MG: 25 INJECTION INTRAMUSCULAR; INTRAVENOUS; SUBCUTANEOUS at 13:18

## 2022-06-20 NOTE — ANESTHESIA PREPROCEDURE EVALUATION
Anesthesia Evaluation     Patient summary reviewed and Nursing notes reviewed   history of anesthetic complications: PONV  NPO Solid Status: > 8 hours  NPO Liquid Status: > 8 hours           Airway   Mallampati: II  TM distance: >3 FB  Neck ROM: full  no difficulty expected  Dental - normal exam     Pulmonary - negative pulmonary ROS and normal exam   (-) asthma, not a smoker  Cardiovascular - normal exam  Exercise tolerance: good (4-7 METS)    NYHA Classification: II  Patient on routine beta blocker and Beta blocker given within 24 hours of surgery    (+) hypertension, hyperlipidemia,   (-) past MI, dysrhythmias      Neuro/Psych- negative ROS  GI/Hepatic/Renal/Endo    (+) obesity,  GERD, GI bleeding , renal disease stones,     Musculoskeletal (-) negative ROS    Abdominal  - normal exam    Bowel sounds: normal.   Substance History - negative use     OB/GYN negative ob/gyn ROS         Other - negative ROS                         Anesthesia Plan    ASA 2     general     intravenous induction     Anesthetic plan, risks, benefits, and alternatives have been provided, discussed and informed consent has been obtained with: patient.  Use of blood products discussed with patient  Consented to blood products.

## 2022-06-20 NOTE — ANESTHESIA PROCEDURE NOTES
Airway  Urgency: elective    Date/Time: 6/20/2022 12:15 PM  Airway not difficult    General Information and Staff    Patient location during procedure: OR  CRNA/CAA: Maryellen Santiago CRNA    Indications and Patient Condition  Indications for airway management: airway protection    Preoxygenated: yes  Mask difficulty assessment: 0 - not attempted    Final Airway Details  Final airway type: supraglottic airway      Successful airway: unique  Size 4    Number of attempts at approach: 1  Assessment: lips, teeth, and gum same as pre-op

## 2022-06-20 NOTE — H&P (VIEW-ONLY)
[facility] Consult Note     Date of Consult: 2022     Patient Name: Francois Griffin  MRN: [id number]   : 1975     Referring Provider: * No referring provider recorded for this case *    Care Team: Patient Care Team:  Holly Rodriguez MD as PCP - General (Family Medicine)     Reason for Hospitalization: left flank pain    History of Present Illness: Was contacted for hospital consultation on Francois Griffin a 46 y.o. male who presents to the hospital for left flank pain.  He reports his pain started overnight progressively got worse.  He was then seen in the emergency room found to have a millimeter left UPJ stone.  He denies any dysuria or gross hematuria.  No fever, chills.  He has been having nausea and vomiting.  He has passed at least 6 or 7 stones previously.  He has had 1 shockwave lithotripsy..      Subjective      Pertinent items are noted in HPI.     Past Medical History:   Past Medical History:   Diagnosis Date   • Elevated cholesterol    • Hyperlipidemia    • Hypertension    • Kidney stone    • PONV (postoperative nausea and vomiting)        Past Surgical History:   Past Surgical History:   Procedure Laterality Date   • APPENDECTOMY     • CHOLECYSTECTOMY     • COLONOSCOPY N/A 3/16/2021    Procedure: COLONOSCOPY WITH BIOPSY;  Surgeon: Mary Cosme MD;  Location: [facility];  Service: Gastroenterology;  Laterality: N/A;   • EXTRACORPOREAL SHOCK WAVE LITHOTRIPSY (ESWL) Right 10/12/2018    Procedure: EXTRACORPOREAL SHOCKWAVE LITHOTRIPSY;  Surgeon: Elvin Whitley MD;  Location: [facility];  Service: Urology   • VOCAL CORD BIOPSY         Family History:   Family History   Problem Relation Age of Onset   • Hypertension Father    • Arthritis Father    • Heart disease Mother    • Hypertension Mother    • Diabetes Mother    • Cancer Maternal Grandmother    • Cancer Maternal Grandfather    • Stroke Maternal Grandfather    • Cancer Paternal Grandfather        Social History:    Social History     Socioeconomic History   • Marital status:    Tobacco Use   • Smoking status: Never Smoker   • Smokeless tobacco: Never Used   Substance and Sexual Activity   • Alcohol use: No   • Drug use: No   • Sexual activity: Defer       Medications:     Current Facility-Administered Medications:   •  ceFAZolin (ANCEF) 2 g in sodium chloride 0.9 % 100 mL IVPB, 2 g, Intravenous, Once, Scar Luna MD  •  midazolam (VERSED) injection 1 mg, 1 mg, Intravenous, Q10 Min PRN, Jam Sweet,   •  [COMPLETED] Insert peripheral IV, , , Once **AND** [MAR Hold] sodium chloride 0.9 % flush 10 mL, 10 mL, Intravenous, PRN, Brown, Darleen, APRN  •  sodium chloride 0.9 % flush 10 mL, 10 mL, Intravenous, Q12H, Jam Sweet,   •  sodium chloride 0.9 % flush 10 mL, 10 mL, Intravenous, PRN, Jam Sweet, DO  •  sodium chloride 0.9 % infusion, 75 mL/hr, Intravenous, Continuous, Brown, Darleen, APRN, Last Rate: 75 mL/hr at 06/20/22 0947, 75 mL/hr at 06/20/22 0947    Allergies:   No Known Allergies    Problem:   Patient Active Problem List   Diagnosis   • Right ureteral stone   • Hematuria   • BRBPR (bright red blood per rectum)   • COVID-19   • Ureteral calculus       Review of Systems:   Constitutional: Negative.  Negative for chills, fatigue and fever.   HENT: Negative.  Negative for sore throat.    Eyes: Negative.    Respiratory: Negative.  Negative for cough and shortness of breath.    Cardiovascular: Negative.  Negative for chest pain.   Gastrointestinal: Positive for nausea and vomiting. Negative for abdominal pain, anorexia, constipation, diarrhea, flatus, hematemesis, hematochezia and melena.   Endocrine: Negative.    Genitourinary: Positive for flank pain. Negative for dysuria, hematuria, vaginal bleeding and vaginal discharge.   Skin: Negative.    Allergic/Immunologic: Negative.    Neurological: Negative.    Hematological: Negative.    Psychiatric/Behavioral: Negative.    All other systems reviewed  and are negative.    Objective     Physical Exam:  Vital Signs:   Temp:  [97.6 °F (36.4 °C)-98 °F (36.7 °C)] 97.6 °F (36.4 °C)  Heart Rate:  [63-72] 63  Resp:  [18-20] 19  BP: (115-162)/() 131/88  97.5 kg (215 lb)  Body mass index is 31.75 kg/m².     Physical Exam  Vitals and nursing note reviewed.   Constitutional:       General: He is not in acute distress.     Appearance: He is well-developed. He is not diaphoretic.   HENT:      Head: Normocephalic and atraumatic.      Right Ear: External ear normal.      Left Ear: External ear normal.      Nose: Nose normal.   Eyes:      Conjunctiva/sclera: Conjunctivae normal.      Pupils: Pupils are equal, round, and reactive to light.   Neck:      Vascular: No JVD.      Trachea: No tracheal deviation.   Cardiovascular:      Rate and Rhythm: Normal rate and regular rhythm.      Heart sounds: Normal heart sounds. No murmur heard.  Pulmonary:      Effort: Pulmonary effort is normal. No respiratory distress.      Breath sounds: Normal breath sounds. No wheezing.   Abdominal:      General: Bowel sounds are normal.      Palpations: Abdomen is soft.      Tenderness: There is no abdominal tenderness. There is left CVA tenderness.   Musculoskeletal:         General: No deformity. Normal range of motion.      Cervical back: Normal range of motion and neck supple.   Skin:     General: Skin is warm and dry.      Capillary Refill: Capillary refill takes less than 2 seconds.      Coloration: Skin is not pale.      Findings: No erythema or rash.   Neurological:      General: No focal deficit present.      Mental Status: He is alert and oriented to person, place, and time. Mental status is at baseline.      Cranial Nerves: No cranial nerve deficit.   Psychiatric:         Behavior: Behavior normal.         Thought Content: Thought content normal.        I/O last 3 completed shifts:  In: 1000 [IV Piggyback:1000]  Out: -     Labs  Lab Results   Component Value Date    GLUCOSE 109 (H)  06/20/2022    CALCIUM 9.2 06/20/2022     06/20/2022    K 4.0 06/20/2022    CO2 23.7 06/20/2022     06/20/2022    BUN 18 06/20/2022    CREATININE 1.09 06/20/2022    EGFRIFNONA 98 03/10/2021    BCR 16.5 06/20/2022    ANIONGAP 13.3 06/20/2022       Lab Results   Component Value Date    WBC 8.17 06/20/2022    HGB 15.4 06/20/2022    HCT 44.5 06/20/2022    MCV 84.8 06/20/2022     06/20/2022       No results found for: URINECX    Brief Urine Lab Results     None          Radiographic Studies  CT Abdomen Pelvis Stone Protocol    Result Date: 6/20/2022  1. 8 mm obstructing stone at the left ureteropelvic junction producing mild left-sided hydronephrosis. 2. Multiple bilateral nonobstructing intrarenal calculi. 3. Appendix not clearly visualized. 4. Cholecystectomy. Signer Name: Alex Solano MD  Signed: 6/20/2022 5:22 AM  Workstation Name: [email or web address]  [facility]      Results Review:   I reviewed the patient's new clinical results.     Imaging Results (Last 72 Hours)     Procedure Component Value Units Date/Time    FL Surgery Fluoro [[id number]] Resulted: 06/20/22 1039     Updated: 06/20/22 1039    CT Abdomen Pelvis Stone Protocol [[id number]] Collected: 06/20/22 0522     Updated: 06/20/22 0524    Narrative:      CT Abdomen Pelvis WO    INDICATION:   Left flank pain beginning this morning.    TECHNIQUE:   CT of the abdomen and pelvis without IV contrast. Coronal and sagittal reconstructions were obtained.  Radiation dose reduction techniques included automated exposure control or exposure modulation based on body size. Count of known CT and cardiac nuc  med studies performed in previous 12 months: 0.     COMPARISON:   CT abdomen pelvis 10/11/2018    FINDINGS:  Visualized lung bases are unremarkable.    Abdomen:   The liver is within normal limits. The spleen and pancreas are normal. Cholecystectomy. Both adrenal glands are normal. 8 mm obstructing stone at the left ureteropelvic  junction producing mild left-sided hydronephrosis. Multiple bilateral nonobstructing  intrarenal calculi. No right-sided ureteral stones or hydronephrosis. Abdominal aorta normal in course and caliber. Small bowel is unremarkable without obstruction. Appendix not clearly visualized. No pericecal inflammation. The colon is unremarkable. No  free fluid or free air.    Pelvis:   The urinary bladder is unremarkable.  The prostate gland is unremarkable. No free pelvic fluid.    No acute osseous abnormality.        Impression:        1. 8 mm obstructing stone at the left ureteropelvic junction producing mild left-sided hydronephrosis.  2. Multiple bilateral nonobstructing intrarenal calculi.  3. Appendix not clearly visualized.  4. Cholecystectomy.          Signer Name: Alex Solano MD   Signed: 6/20/2022 5:22 AM   Workstation Name: [email or web address]    [facility]          Assessment / Plan      Assessment/Plan:Mr. Griffin is a 46-year-old gentleman with a left 8 mm UPJ stone with severe flank pain, nausea, and vomiting.  I discussed his options with him which included medical expulsive therapy versus definitive stone management.  After hearing the specific risks and benefits of each he would like to move forward with ureteroscopy and laser lithotripsy.       1.  OR today for ureteroscopy and laser lithotripsy.    I discussed the patients findings and my recommendations with the patient.     Time: Total face-to-face/floor time. 60 min    Scar Luna MD   06/20/22  11:55 EDT

## 2022-06-20 NOTE — ED PROVIDER NOTES
Subjective   Patient is a 46-year-old male with past medical history significant for hypertension, hyperlipidemia, and kidney stones.  He presents to the ED today with complaints of left-sided flank pain and nausea with vomiting.  Patient states that he has had kidney stones in the past and is followed by Dr. Whitley with Urology.  Patient denies any other significant complaints.      History provided by:  Patient  Flank Pain  Pain location:  L flank  Pain quality: sharp, stabbing and throbbing    Pain radiates to:  Suprapubic region  Pain severity:  Severe  Onset quality:  Sudden  Timing:  Constant  Progression:  Unchanged  Chronicity:  New  Context: awakening from sleep    Context: not alcohol use, not diet changes, not eating, not laxative use, not medication withdrawal, not previous surgeries, not recent illness, not recent sexual activity, not recent travel, not retching, not sick contacts, not suspicious food intake and not trauma    Relieved by:  Nothing  Associated symptoms: nausea and vomiting    Associated symptoms: no anorexia, no belching, no chest pain, no chills, no constipation, no cough, no diarrhea, no dysuria, no fatigue, no fever, no flatus, no hematemesis, no hematochezia, no hematuria, no melena, no shortness of breath, no sore throat, no vaginal bleeding and no vaginal discharge    Risk factors: no alcohol abuse, no aspirin use, not elderly, has not had multiple surgeries, no NSAID use, not obese and no recent hospitalization        Review of Systems   Constitutional: Negative.  Negative for chills, fatigue and fever.   HENT: Negative.  Negative for sore throat.    Eyes: Negative.    Respiratory: Negative.  Negative for cough and shortness of breath.    Cardiovascular: Negative.  Negative for chest pain.   Gastrointestinal: Positive for nausea and vomiting. Negative for abdominal pain, anorexia, constipation, diarrhea, flatus, hematemesis, hematochezia and melena.   Endocrine: Negative.     Genitourinary: Positive for flank pain. Negative for dysuria, hematuria, vaginal bleeding and vaginal discharge.   Skin: Negative.    Allergic/Immunologic: Negative.    Neurological: Negative.    Hematological: Negative.    Psychiatric/Behavioral: Negative.    All other systems reviewed and are negative.      Past Medical History:   Diagnosis Date   • Elevated cholesterol    • Hyperlipidemia    • Hypertension    • Kidney stone    • PONV (postoperative nausea and vomiting)        No Known Allergies    Past Surgical History:   Procedure Laterality Date   • APPENDECTOMY     • CHOLECYSTECTOMY     • COLONOSCOPY N/A 3/16/2021    Procedure: COLONOSCOPY WITH BIOPSY;  Surgeon: Mary Cosme MD;  Location: [facility];  Service: Gastroenterology;  Laterality: N/A;   • EXTRACORPOREAL SHOCK WAVE LITHOTRIPSY (ESWL) Right 10/12/2018    Procedure: EXTRACORPOREAL SHOCKWAVE LITHOTRIPSY;  Surgeon: Elvin Whitley MD;  Location: [facility];  Service: Urology   • VOCAL CORD BIOPSY         Family History   Problem Relation Age of Onset   • Hypertension Father    • Arthritis Father    • Heart disease Mother    • Hypertension Mother    • Diabetes Mother    • Cancer Maternal Grandmother    • Cancer Maternal Grandfather    • Stroke Maternal Grandfather    • Cancer Paternal Grandfather        Social History     Socioeconomic History   • Marital status:    Tobacco Use   • Smoking status: Never Smoker   • Smokeless tobacco: Never Used   Substance and Sexual Activity   • Alcohol use: No   • Drug use: No   • Sexual activity: Defer           Objective   Physical Exam  Vitals and nursing note reviewed.   Constitutional:       General: He is not in acute distress.     Appearance: He is well-developed. He is not diaphoretic.   HENT:      Head: Normocephalic and atraumatic.      Right Ear: External ear normal.      Left Ear: External ear normal.      Nose: Nose normal.   Eyes:      Conjunctiva/sclera: Conjunctivae normal.       Pupils: Pupils are equal, round, and reactive to light.   Neck:      Vascular: No JVD.      Trachea: No tracheal deviation.   Cardiovascular:      Rate and Rhythm: Normal rate and regular rhythm.      Heart sounds: Normal heart sounds. No murmur heard.  Pulmonary:      Effort: Pulmonary effort is normal. No respiratory distress.      Breath sounds: Normal breath sounds. No wheezing.   Abdominal:      General: Bowel sounds are normal.      Palpations: Abdomen is soft.      Tenderness: There is no abdominal tenderness. There is left CVA tenderness.   Musculoskeletal:         General: No deformity. Normal range of motion.      Cervical back: Normal range of motion and neck supple.   Skin:     General: Skin is warm and dry.      Capillary Refill: Capillary refill takes less than 2 seconds.      Coloration: Skin is not pale.      Findings: No erythema or rash.   Neurological:      General: No focal deficit present.      Mental Status: He is alert and oriented to person, place, and time. Mental status is at baseline.      Cranial Nerves: No cranial nerve deficit.   Psychiatric:         Behavior: Behavior normal.         Thought Content: Thought content normal.         Procedures       Results for orders placed or performed during the hospital encounter of 06/20/22   COVID-19 and FLU A/B PCR - Swab, Nasopharynx    Specimen: Nasopharynx; Swab   Result Value Ref Range    COVID19 Not Detected Not Detected - Ref. Range    Influenza A PCR Not Detected Not Detected    Influenza B PCR Not Detected Not Detected   Urine Culture - Urine, Kidney, Left    Specimen: Kidney, Left; Urine   Result Value Ref Range    Urine Culture No growth    Urine Culture - Urine, Urinary Bladder    Specimen: Urinary Bladder; Urine   Result Value Ref Range    Urine Culture No growth    Comprehensive Metabolic Panel    Specimen: Arm, Left; Blood   Result Value Ref Range    Glucose 109 (H) 65 - 99 mg/dL    BUN 18 6 - 20 mg/dL    Creatinine 1.09 0.76 -  1.27 mg/dL    Sodium 144 136 - 145 mmol/L    Potassium 4.0 3.5 - 5.2 mmol/L    Chloride 107 98 - 107 mmol/L    CO2 23.7 22.0 - 29.0 mmol/L    Calcium 9.2 8.6 - 10.5 mg/dL    Total Protein 7.1 6.0 - 8.5 g/dL    Albumin 4.12 3.50 - 5.20 g/dL    ALT (SGPT) 29 1 - 41 U/L    AST (SGOT) 29 1 - 40 U/L    Alkaline Phosphatase 88 39 - 117 U/L    Total Bilirubin 0.9 0.0 - 1.2 mg/dL    Globulin 3.0 gm/dL    A/G Ratio 1.4 g/dL    BUN/Creatinine Ratio 16.5 7.0 - 25.0    Anion Gap 13.3 5.0 - 15.0 mmol/L    eGFR 85.8 >60.0 mL/min/1.73   Lipase    Specimen: Arm, Left; Blood   Result Value Ref Range    Lipase 55 13 - 60 U/L   Troponin    Specimen: Arm, Left; Blood   Result Value Ref Range    Troponin T <0.010 0.000 - 0.030 ng/mL   C-reactive Protein    Specimen: Arm, Left; Blood   Result Value Ref Range    C-Reactive Protein 0.44 0.00 - 0.50 mg/dL   Lactic Acid, Plasma    Specimen: Arm, Left; Blood   Result Value Ref Range    Lactate 1.9 0.5 - 2.0 mmol/L   CBC Auto Differential    Specimen: Arm, Left; Blood   Result Value Ref Range    WBC 8.17 3.40 - 10.80 10*3/mm3    RBC 5.25 4.14 - 5.80 10*6/mm3    Hemoglobin 15.4 13.0 - 17.7 g/dL    Hematocrit 44.5 37.5 - 51.0 %    MCV 84.8 79.0 - 97.0 fL    MCH 29.3 26.6 - 33.0 pg    MCHC 34.6 31.5 - 35.7 g/dL    RDW 12.6 12.3 - 15.4 %    RDW-SD 38.5 37.0 - 54.0 fl    MPV 9.4 6.0 - 12.0 fL    Platelets 340 140 - 450 10*3/mm3    Neutrophil % 44.2 42.7 - 76.0 %    Lymphocyte % 43.1 19.6 - 45.3 %    Monocyte % 8.8 5.0 - 12.0 %    Eosinophil % 2.6 0.3 - 6.2 %    Basophil % 0.9 0.0 - 1.5 %    Immature Grans % 0.4 0.0 - 0.5 %    Neutrophils, Absolute 3.62 1.70 - 7.00 10*3/mm3    Lymphocytes, Absolute 3.52 (H) 0.70 - 3.10 10*3/mm3    Monocytes, Absolute 0.72 0.10 - 0.90 10*3/mm3    Eosinophils, Absolute 0.21 0.00 - 0.40 10*3/mm3    Basophils, Absolute 0.07 0.00 - 0.20 10*3/mm3    Immature Grans, Absolute 0.03 0.00 - 0.05 10*3/mm3    nRBC 0.0 0.0 - 0.2 /100 WBC   Green Top (Gel)   Result Value Ref Range     Extra Tube Hold for add-ons.    Lavender Top   Result Value Ref Range    Extra Tube hold for add-on    Gold Top - SST   Result Value Ref Range    Extra Tube Hold for add-ons.    Light Blue Top   Result Value Ref Range    Extra Tube Hold for add-ons.        ED Course  ED Course as of 06/21/22 2320 Mon Jun 20, 2022   0559 CT Abdomen Pelvis Stone Protocol  IMPRESSION:     1. 8 mm obstructing stone at the left ureteropelvic junction producing mild left-sided hydronephrosis.  2. Multiple bilateral nonobstructing intrarenal calculi.  3. Appendix not clearly visualized.  4. Cholecystectomy.    [MB]   0651 Case d/w Dr. Luna, will come see patient in ED this afternoon with plans to take to OR. Will place him on his list. [MB]      ED Course User Index  [MB] Darleen Back APRN                                                 [facility]    Final diagnoses:   Kidney stone       ED Disposition  ED Disposition     ED Disposition   Send to OR    Condition   --    Comment   --             Holly Rodriguez MD  [address]  [address]  [place] [address]  [phone]               Medication List      New Prescriptions    cephalexin 500 MG capsule  Commonly known as: KEFLEX  Take 1 capsule by mouth 2 (Two) Times a Day for 10 days.     phenazopyridine 200 MG tablet  Commonly known as: Pyridium  Take 1 tablet by mouth 3 (Three) Times a Day As Needed for Bladder Spasms.        Changed    tamsulosin 0.4 MG capsule 24 hr capsule  Commonly known as: FLOMAX  Take 1 capsule by mouth Daily.  What changed: when to take this        Stop    HYDROcodone-acetaminophen  MG per tablet  Commonly known as: Norco     promethazine 25 MG tablet  Commonly known as: PHENERGAN           Where to Get Your Medications      These medications were sent to [facility]  [address] [address]    Hours: 8AM-6PM Mon-Fri Phone: [phone]   · cephalexin 500 MG capsule  · ketorolac 10 MG tablet  · phenazopyridine 200 MG  tablet  · tamsulosin 0.4 MG capsule 24 hr capsule          Darleen Back, APRN  06/21/22 9243

## 2022-06-20 NOTE — ANESTHESIA POSTPROCEDURE EVALUATION
Patient: Francois Griffin    Procedure Summary     Date: 06/20/22 Room / Location: [facility] OR 05 /  [facility] OR    Anesthesia Start: 1210 Anesthesia Stop: 1255    Procedure: CYSTOSCOPY RETROGRADE PYELOGRAM AND STENT INSERTION (Left ) Diagnosis:       Left ureteral stone      (Left ureteral stone [N20.1])    Surgeons: Scar Luna MD Provider: Jam Sweet DO    Anesthesia Type: general ASA Status: 2          Anesthesia Type: general    Vitals  Vitals Value Taken Time   /93 06/20/22 1330   Temp 97.3 °F (36.3 °C) 06/20/22 1255   Pulse 84 06/20/22 1330   Resp 15 06/20/22 1330   SpO2 98 % 06/20/22 1330           Post Anesthesia Care and Evaluation    Patient location during evaluation: PHASE II  Patient participation: complete - patient participated  Level of consciousness: awake and alert  Pain score: 1  Pain management: adequate    Airway patency: patent  Anesthetic complications: No anesthetic complications  PONV Status: controlled  Cardiovascular status: acceptable  Respiratory status: acceptable  Hydration status: acceptable

## 2022-06-20 NOTE — H&P (VIEW-ONLY)
Urology Hospital Consult Note     Date of Consult: 2022     Patient Name: Francois Griffin  MRN: [id number]   : 1975     Referring Provider: * No referring provider recorded for this case *    Care Team: Patient Care Team:  Holly Rodriguez MD as PCP - General (Family Medicine)     Reason for Hospitalization: left flank pain    History of Present Illness: Was contacted for hospital consultation on Francois Griffin a 46 y.o. male who presents to the hospital for left flank pain.  He reports his pain started overnight progressively got worse.  He was then seen in the emergency room found to have a millimeter left UPJ stone.  He denies any dysuria or gross hematuria.  No fever, chills.  He has been having nausea and vomiting.  He has passed at least 6 or 7 stones previously.  He has had 1 shockwave lithotripsy..      Subjective      Pertinent items are noted in HPI.     Past Medical History:   Past Medical History:   Diagnosis Date   • Elevated cholesterol    • Hyperlipidemia    • Hypertension    • Kidney stone    • PONV (postoperative nausea and vomiting)        Past Surgical History:   Past Surgical History:   Procedure Laterality Date   • APPENDECTOMY     • CHOLECYSTECTOMY     • COLONOSCOPY N/A 3/16/2021    Procedure: COLONOSCOPY WITH BIOPSY;  Surgeon: Mary Cosme MD;  Location: [facility];  Service: Gastroenterology;  Laterality: N/A;   • EXTRACORPOREAL SHOCK WAVE LITHOTRIPSY (ESWL) Right 10/12/2018    Procedure: EXTRACORPOREAL SHOCKWAVE LITHOTRIPSY;  Surgeon: Elvin Whitley MD;  Location: [facility];  Service: Urology   • VOCAL CORD BIOPSY         Family History:   Family History   Problem Relation Age of Onset   • Hypertension Father    • Arthritis Father    • Heart disease Mother    • Hypertension Mother    • Diabetes Mother    • Cancer Maternal Grandmother    • Cancer Maternal Grandfather    • Stroke Maternal Grandfather    • Cancer Paternal Grandfather        Social History:    Social History     Socioeconomic History   • Marital status:    Tobacco Use   • Smoking status: Never Smoker   • Smokeless tobacco: Never Used   Substance and Sexual Activity   • Alcohol use: No   • Drug use: No   • Sexual activity: Defer       Medications:     Current Facility-Administered Medications:   •  ceFAZolin (ANCEF) 2 g in sodium chloride 0.9 % 100 mL IVPB, 2 g, Intravenous, Once, Scar Luna MD  •  midazolam (VERSED) injection 1 mg, 1 mg, Intravenous, Q10 Min PRN, Jam Sweet,   •  [COMPLETED] Insert peripheral IV, , , Once **AND** [MAR Hold] sodium chloride 0.9 % flush 10 mL, 10 mL, Intravenous, PRN, Brown, Darleen, APRN  •  sodium chloride 0.9 % flush 10 mL, 10 mL, Intravenous, Q12H, Jam Sweet,   •  sodium chloride 0.9 % flush 10 mL, 10 mL, Intravenous, PRN, Jam Sweet, DO  •  sodium chloride 0.9 % infusion, 75 mL/hr, Intravenous, Continuous, Brown, Darleen, APRN, Last Rate: 75 mL/hr at 06/20/22 0947, 75 mL/hr at 06/20/22 0947    Allergies:   No Known Allergies    Problem:   Patient Active Problem List   Diagnosis   • Right ureteral stone   • Hematuria   • BRBPR (bright red blood per rectum)   • COVID-19   • Ureteral calculus       Review of Systems:   Constitutional: Negative.  Negative for chills, fatigue and fever.   HENT: Negative.  Negative for sore throat.    Eyes: Negative.    Respiratory: Negative.  Negative for cough and shortness of breath.    Cardiovascular: Negative.  Negative for chest pain.   Gastrointestinal: Positive for nausea and vomiting. Negative for abdominal pain, anorexia, constipation, diarrhea, flatus, hematemesis, hematochezia and melena.   Endocrine: Negative.    Genitourinary: Positive for flank pain. Negative for dysuria, hematuria, vaginal bleeding and vaginal discharge.   Skin: Negative.    Allergic/Immunologic: Negative.    Neurological: Negative.    Hematological: Negative.    Psychiatric/Behavioral: Negative.    All other systems reviewed  and are negative.    Objective     Physical Exam:  Vital Signs:   Temp:  [97.6 °F (36.4 °C)-98 °F (36.7 °C)] 97.6 °F (36.4 °C)  Heart Rate:  [63-72] 63  Resp:  [18-20] 19  BP: (115-162)/() 131/88  97.5 kg (215 lb)  Body mass index is 31.75 kg/m².     Physical Exam  Vitals and nursing note reviewed.   Constitutional:       General: He is not in acute distress.     Appearance: He is well-developed. He is not diaphoretic.   HENT:      Head: Normocephalic and atraumatic.      Right Ear: External ear normal.      Left Ear: External ear normal.      Nose: Nose normal.   Eyes:      Conjunctiva/sclera: Conjunctivae normal.      Pupils: Pupils are equal, round, and reactive to light.   Neck:      Vascular: No JVD.      Trachea: No tracheal deviation.   Cardiovascular:      Rate and Rhythm: Normal rate and regular rhythm.      Heart sounds: Normal heart sounds. No murmur heard.  Pulmonary:      Effort: Pulmonary effort is normal. No respiratory distress.      Breath sounds: Normal breath sounds. No wheezing.   Abdominal:      General: Bowel sounds are normal.      Palpations: Abdomen is soft.      Tenderness: There is no abdominal tenderness. There is left CVA tenderness.   Musculoskeletal:         General: No deformity. Normal range of motion.      Cervical back: Normal range of motion and neck supple.   Skin:     General: Skin is warm and dry.      Capillary Refill: Capillary refill takes less than 2 seconds.      Coloration: Skin is not pale.      Findings: No erythema or rash.   Neurological:      General: No focal deficit present.      Mental Status: He is alert and oriented to person, place, and time. Mental status is at baseline.      Cranial Nerves: No cranial nerve deficit.   Psychiatric:         Behavior: Behavior normal.         Thought Content: Thought content normal.        I/O last 3 completed shifts:  In: 1000 [IV Piggyback:1000]  Out: -     Labs  Lab Results   Component Value Date    GLUCOSE 109 (H)  06/20/2022    CALCIUM 9.2 06/20/2022     06/20/2022    K 4.0 06/20/2022    CO2 23.7 06/20/2022     06/20/2022    BUN 18 06/20/2022    CREATININE 1.09 06/20/2022    EGFRIFNONA 98 03/10/2021    BCR 16.5 06/20/2022    ANIONGAP 13.3 06/20/2022       Lab Results   Component Value Date    WBC 8.17 06/20/2022    HGB 15.4 06/20/2022    HCT 44.5 06/20/2022    MCV 84.8 06/20/2022     06/20/2022       No results found for: URINECX    Brief Urine Lab Results     None          Radiographic Studies  CT Abdomen Pelvis Stone Protocol    Result Date: 6/20/2022  1. 8 mm obstructing stone at the left ureteropelvic junction producing mild left-sided hydronephrosis. 2. Multiple bilateral nonobstructing intrarenal calculi. 3. Appendix not clearly visualized. 4. Cholecystectomy. Signer Name: Alex Solano MD  Signed: 6/20/2022 5:22 AM  Workstation Name: [id number]  [facility]      Results Review:   I reviewed the patient's new clinical results.     Imaging Results (Last 72 Hours)     Procedure Component Value Units Date/Time    FL Surgery Fluoro [[id number]] Resulted: 06/20/22 1039     Updated: 06/20/22 1039    CT Abdomen Pelvis Stone Protocol [[id number]] Collected: 06/20/22 0522     Updated: 06/20/22 0524    Narrative:      CT Abdomen Pelvis WO    INDICATION:   Left flank pain beginning this morning.    TECHNIQUE:   CT of the abdomen and pelvis without IV contrast. Coronal and sagittal reconstructions were obtained.  Radiation dose reduction techniques included automated exposure control or exposure modulation based on body size. Count of known CT and cardiac nuc  med studies performed in previous 12 months: 0.     COMPARISON:   CT abdomen pelvis 10/11/2018    FINDINGS:  Visualized lung bases are unremarkable.    Abdomen:   The liver is within normal limits. The spleen and pancreas are normal. Cholecystectomy. Both adrenal glands are normal. 8 mm obstructing stone at the left ureteropelvic  junction producing mild left-sided hydronephrosis. Multiple bilateral nonobstructing  intrarenal calculi. No right-sided ureteral stones or hydronephrosis. Abdominal aorta normal in course and caliber. Small bowel is unremarkable without obstruction. Appendix not clearly visualized. No pericecal inflammation. The colon is unremarkable. No  free fluid or free air.    Pelvis:   The urinary bladder is unremarkable.  The prostate gland is unremarkable. No free pelvic fluid.    No acute osseous abnormality.        Impression:        1. 8 mm obstructing stone at the left ureteropelvic junction producing mild left-sided hydronephrosis.  2. Multiple bilateral nonobstructing intrarenal calculi.  3. Appendix not clearly visualized.  4. Cholecystectomy.          Signer Name: Alex Solano MD   Signed: 6/20/2022 5:22 AM   Work[facility]: [email or web address]    [facility]          Assessment / Plan      Assessment/Plan:Mr. Griffin is a 46-year-old gentleman with a left 8 mm UPJ stone with severe flank pain, nausea, and vomiting.  I discussed his options with him which included medical expulsive therapy versus definitive stone management.  After hearing the specific risks and benefits of each he would like to move forward with ureteroscopy and laser lithotripsy.       1.  OR today for ureteroscopy and laser lithotripsy.    I discussed the patients findings and my recommendations with the patient.     Time: Total face-to-face/floor time. 60 min    Scar Luna MD   06/20/22  11:55 EDT

## 2022-06-20 NOTE — CONSULTS
Urology Hospital Consult Note     Date of Consult: 2022     Patient Name: Francois Griffin  MRN: [id number]   : 1975     Referring Provider: * No referring provider recorded for this case *    Care Team: Patient Care Team:  Holly Rodriguez MD as PCP - General (Family Medicine)     Reason for Hospitalization: left flank pain    History of Present Illness: Was contacted for hospital consultation on Francois Griffin a 46 y.o. male who presents to the hospital for left flank pain.  He reports his pain started overnight progressively got worse.  He was then seen in the emergency room found to have a millimeter left UPJ stone.  He denies any dysuria or gross hematuria.  No fever, chills.  He has been having nausea and vomiting.  He has passed at least 6 or 7 stones previously.  He has had 1 shockwave lithotripsy..      Subjective      Pertinent items are noted in HPI.     Past Medical History:   Past Medical History:   Diagnosis Date   • Elevated cholesterol    • Hyperlipidemia    • Hypertension    • Kidney stone    • PONV (postoperative nausea and vomiting)        Past Surgical History:   Past Surgical History:   Procedure Laterality Date   • APPENDECTOMY     • CHOLECYSTECTOMY     • COLONOSCOPY N/A 3/16/2021    Procedure: COLONOSCOPY WITH BIOPSY;  Surgeon: Mary Cosme MD;  Location: [facility];  Service: Gastroenterology;  Laterality: N/A;   • EXTRACORPOREAL SHOCK WAVE LITHOTRIPSY (ESWL) Right 10/12/2018    Procedure: EXTRACORPOREAL SHOCKWAVE LITHOTRIPSY;  Surgeon: Elvin Whitley MD;  Location: [facility];  Service: Urology   • VOCAL CORD BIOPSY         Family History:   Family History   Problem Relation Age of Onset   • Hypertension Father    • Arthritis Father    • Heart disease Mother    • Hypertension Mother    • Diabetes Mother    • Cancer Maternal Grandmother    • Cancer Maternal Grandfather    • Stroke Maternal Grandfather    • Cancer Paternal Grandfather        Social History:    Social History     Socioeconomic History   • Marital status:    Tobacco Use   • Smoking status: Never Smoker   • Smokeless tobacco: Never Used   Substance and Sexual Activity   • Alcohol use: No   • Drug use: No   • Sexual activity: Defer       Medications:     Current Facility-Administered Medications:   •  ceFAZolin (ANCEF) 2 g in sodium chloride 0.9 % 100 mL IVPB, 2 g, Intravenous, Once, Scar Luna MD  •  midazolam (VERSED) injection 1 mg, 1 mg, Intravenous, Q10 Min PRN, Jam Sweet,   •  [COMPLETED] Insert peripheral IV, , , Once **AND** [MAR Hold] sodium chloride 0.9 % flush 10 mL, 10 mL, Intravenous, PRN, Brown, Darleen, APRN  •  sodium chloride 0.9 % flush 10 mL, 10 mL, Intravenous, Q12H, Jam Sweet,   •  sodium chloride 0.9 % flush 10 mL, 10 mL, Intravenous, PRN, Jam Sweet, DO  •  sodium chloride 0.9 % infusion, 75 mL/hr, Intravenous, Continuous, Brown, Darleen, APRN, Last Rate: 75 mL/hr at 06/20/22 0947, 75 mL/hr at 06/20/22 0947    Allergies:   No Known Allergies    Problem:   Patient Active Problem List   Diagnosis   • Right ureteral stone   • Hematuria   • BRBPR (bright red blood per rectum)   • COVID-19   • Ureteral calculus       Review of Systems:   Constitutional: Negative.  Negative for chills, fatigue and fever.   HENT: Negative.  Negative for sore throat.    Eyes: Negative.    Respiratory: Negative.  Negative for cough and shortness of breath.    Cardiovascular: Negative.  Negative for chest pain.   Gastrointestinal: Positive for nausea and vomiting. Negative for abdominal pain, anorexia, constipation, diarrhea, flatus, hematemesis, hematochezia and melena.   Endocrine: Negative.    Genitourinary: Positive for flank pain. Negative for dysuria, hematuria, vaginal bleeding and vaginal discharge.   Skin: Negative.    Allergic/Immunologic: Negative.    Neurological: Negative.    Hematological: Negative.    Psychiatric/Behavioral: Negative.    All other systems reviewed  and are negative.    Objective     Physical Exam:  Vital Signs:   Temp:  [97.6 °F (36.4 °C)-98 °F (36.7 °C)] 97.6 °F (36.4 °C)  Heart Rate:  [63-72] 63  Resp:  [18-20] 19  BP: (115-162)/() 131/88  97.5 kg (215 lb)  Body mass index is 31.75 kg/m².     Physical Exam  Vitals and nursing note reviewed.   Constitutional:       General: He is not in acute distress.     Appearance: He is well-developed. He is not diaphoretic.   HENT:      Head: Normocephalic and atraumatic.      Right Ear: External ear normal.      Left Ear: External ear normal.      Nose: Nose normal.   Eyes:      Conjunctiva/sclera: Conjunctivae normal.      Pupils: Pupils are equal, round, and reactive to light.   Neck:      Vascular: No JVD.      Trachea: No tracheal deviation.   Cardiovascular:      Rate and Rhythm: Normal rate and regular rhythm.      Heart sounds: Normal heart sounds. No murmur heard.  Pulmonary:      Effort: Pulmonary effort is normal. No respiratory distress.      Breath sounds: Normal breath sounds. No wheezing.   Abdominal:      General: Bowel sounds are normal.      Palpations: Abdomen is soft.      Tenderness: There is no abdominal tenderness. There is left CVA tenderness.   Musculoskeletal:         General: No deformity. Normal range of motion.      Cervical back: Normal range of motion and neck supple.   Skin:     General: Skin is warm and dry.      Capillary Refill: Capillary refill takes less than 2 seconds.      Coloration: Skin is not pale.      Findings: No erythema or rash.   Neurological:      General: No focal deficit present.      Mental Status: He is alert and oriented to person, place, and time. Mental status is at baseline.      Cranial Nerves: No cranial nerve deficit.   Psychiatric:         Behavior: Behavior normal.         Thought Content: Thought content normal.        I/O last 3 completed shifts:  In: 1000 [IV Piggyback:1000]  Out: -     Labs  Lab Results   Component Value Date    GLUCOSE 109 (H)  06/20/2022    CALCIUM 9.2 06/20/2022     06/20/2022    K 4.0 06/20/2022    CO2 23.7 06/20/2022     06/20/2022    BUN 18 06/20/2022    CREATININE 1.09 06/20/2022    EGFRIFNONA 98 03/10/2021    BCR 16.5 06/20/2022    ANIONGAP 13.3 06/20/2022       Lab Results   Component Value Date    WBC 8.17 06/20/2022    HGB 15.4 06/20/2022    HCT 44.5 06/20/2022    MCV 84.8 06/20/2022     06/20/2022       No results found for: URINECX    Brief Urine Lab Results     None          Radiographic Studies  CT Abdomen Pelvis Stone Protocol    Result Date: 6/20/2022  1. 8 mm obstructing stone at the left ureteropelvic junction producing mild left-sided hydronephrosis. 2. Multiple bilateral nonobstructing intrarenal calculi. 3. Appendix not clearly visualized. 4. Cholecystectomy. Signer Name: Alex Solano MD  Signed: 6/20/2022 5:22 AM  Workstation Name: [id number]  [facility]      Results Review:   I reviewed the patient's new clinical results.     Imaging Results (Last 72 Hours)     Procedure Component Value Units Date/Time    FL Surgery Fluoro [[id number]] Resulted: 06/20/22 1039     Updated: 06/20/22 1039    CT Abdomen Pelvis Stone Protocol [[id number]] Collected: 06/20/22 0522     Updated: 06/20/22 0524    Narrative:      CT Abdomen Pelvis WO    INDICATION:   Left flank pain beginning this morning.    TECHNIQUE:   CT of the abdomen and pelvis without IV contrast. Coronal and sagittal reconstructions were obtained.  Radiation dose reduction techniques included automated exposure control or exposure modulation based on body size. Count of known CT and cardiac nuc  med studies performed in previous 12 months: 0.     COMPARISON:   CT abdomen pelvis 10/11/2018    FINDINGS:  Visualized lung bases are unremarkable.    Abdomen:   The liver is within normal limits. The spleen and pancreas are normal. Cholecystectomy. Both adrenal glands are normal. 8 mm obstructing stone at the left ureteropelvic  junction producing mild left-sided hydronephrosis. Multiple bilateral nonobstructing  intrarenal calculi. No right-sided ureteral stones or hydronephrosis. Abdominal aorta normal in course and caliber. Small bowel is unremarkable without obstruction. Appendix not clearly visualized. No pericecal inflammation. The colon is unremarkable. No  free fluid or free air.    Pelvis:   The urinary bladder is unremarkable.  The prostate gland is unremarkable. No free pelvic fluid.    No acute osseous abnormality.        Impression:        1. 8 mm obstructing stone at the left ureteropelvic junction producing mild left-sided hydronephrosis.  2. Multiple bilateral nonobstructing intrarenal calculi.  3. Appendix not clearly visualized.  4. Cholecystectomy.          Signer Name: Alex Solano MD   Signed: 6/20/2022 5:22 AM   [facility]station [facility]: [email or web address]    [facility]          Assessment / Plan      Assessment/Plan:Mr. Griffin is a 46-year-old gentleman with a left 8 mm UPJ stone with severe flank pain, nausea, and vomiting.  I discussed his options with him which included medical expulsive therapy versus definitive stone management.  After hearing the specific risks and benefits of each he would like to move forward with ureteroscopy and laser lithotripsy.       1.  OR today for ureteroscopy and laser lithotripsy.    I discussed the patients findings and my recommendations with the patient.     Time: Total face-to-face/floor time. 60 min    Scar Luna MD   06/20/22  11:55 EDT

## 2022-06-22 DIAGNOSIS — N20.0 NEPHROLITHIASIS: Primary | ICD-10-CM

## 2022-06-22 LAB
BACTERIA SPEC AEROBE CULT: NO GROWTH
BACTERIA SPEC AEROBE CULT: NO GROWTH

## 2022-06-22 RX ORDER — CEFDINIR 300 MG/1
300 CAPSULE ORAL 2 TIMES DAILY
Qty: 20 CAPSULE | Refills: 0 | Status: SHIPPED | OUTPATIENT
Start: 2022-06-22 | End: 2022-08-30

## 2022-06-23 ENCOUNTER — HOSPITAL ENCOUNTER (OUTPATIENT)
Facility: HOSPITAL | Age: 47
Setting detail: HOSPITAL OUTPATIENT SURGERY
End: 2022-06-23
Attending: UROLOGY | Admitting: UROLOGY

## 2022-06-23 DIAGNOSIS — N20.1 LEFT URETERAL STONE: Primary | ICD-10-CM

## 2022-06-23 RX ORDER — GENTAMICIN SULFATE 80 MG/100ML
80 INJECTION, SOLUTION INTRAVENOUS ONCE
Status: ACTIVE | OUTPATIENT
Start: 2022-06-23

## 2022-06-23 RX ORDER — OXYBUTYNIN CHLORIDE 5 MG/1
5 TABLET ORAL 3 TIMES DAILY PRN
Qty: 24 TABLET | Refills: 0 | Status: SHIPPED | OUTPATIENT
Start: 2022-06-23 | End: 2022-08-30

## 2022-06-27 ENCOUNTER — HOSPITAL ENCOUNTER (OUTPATIENT)
Facility: HOSPITAL | Age: 47
Setting detail: HOSPITAL OUTPATIENT SURGERY
Discharge: HOME | End: 2022-06-27
Attending: UROLOGY | Admitting: UROLOGY

## 2022-06-27 ENCOUNTER — ANESTHESIA EVENT (OUTPATIENT)
Dept: PERIOP | Facility: HOSPITAL | Age: 47
End: 2022-06-27

## 2022-06-27 ENCOUNTER — ANESTHESIA (OUTPATIENT)
Dept: PERIOP | Facility: HOSPITAL | Age: 47
End: 2022-06-27

## 2022-06-27 ENCOUNTER — APPOINTMENT (OUTPATIENT)
Dept: GENERAL RADIOLOGY | Facility: HOSPITAL | Age: 47
End: 2022-06-27

## 2022-06-27 ENCOUNTER — LAB (OUTPATIENT)
Dept: LAB | Facility: HOSPITAL | Age: 47
End: 2022-06-27

## 2022-06-27 VITALS
DIASTOLIC BLOOD PRESSURE: 76 MMHG | HEIGHT: 69 IN | BODY MASS INDEX: 32.14 KG/M2 | OXYGEN SATURATION: 98 % | SYSTOLIC BLOOD PRESSURE: 120 MMHG | HEART RATE: 55 BPM | RESPIRATION RATE: 18 BRPM | WEIGHT: 217 LBS | TEMPERATURE: 97.4 F

## 2022-06-27 DIAGNOSIS — N20.1 URETERAL CALCULUS: Primary | ICD-10-CM

## 2022-06-27 DIAGNOSIS — N20.1 URETERAL CALCULUS: ICD-10-CM

## 2022-06-27 LAB — SARS-COV-2 RNA RESP QL NAA+PROBE: NOT DETECTED

## 2022-06-27 PROCEDURE — 0 MEPERIDINE PER 100 MG: Performed by: NURSE ANESTHETIST, CERTIFIED REGISTERED

## 2022-06-27 PROCEDURE — 25010000002 DEXAMETHASONE PER 1 MG: Performed by: NURSE ANESTHETIST, CERTIFIED REGISTERED

## 2022-06-27 PROCEDURE — 52356 CYSTO/URETERO W/LITHOTRIPSY: CPT | Performed by: UROLOGY

## 2022-06-27 PROCEDURE — 25010000002 GENTAMICIN PER 80 MG: Performed by: UROLOGY

## 2022-06-27 PROCEDURE — 25010000002 PROCHLORPERAZINE 10 MG/2ML SOLUTION: Performed by: NURSE ANESTHETIST, CERTIFIED REGISTERED

## 2022-06-27 PROCEDURE — 76000 FLUOROSCOPY <1 HR PHYS/QHP: CPT

## 2022-06-27 PROCEDURE — 25010000002 MIDAZOLAM PER 1 MG: Performed by: NURSE ANESTHETIST, CERTIFIED REGISTERED

## 2022-06-27 PROCEDURE — 25010000002 HYDROMORPHONE 1 MG/ML SOLUTION: Performed by: UROLOGY

## 2022-06-27 PROCEDURE — 25010000002 HYDROMORPHONE PER 4 MG: Performed by: NURSE ANESTHETIST, CERTIFIED REGISTERED

## 2022-06-27 PROCEDURE — 25010000002 ONDANSETRON PER 1 MG: Performed by: NURSE ANESTHETIST, CERTIFIED REGISTERED

## 2022-06-27 PROCEDURE — 87635 SARS-COV-2 COVID-19 AMP PRB: CPT | Performed by: UROLOGY

## 2022-06-27 PROCEDURE — 76000 FLUOROSCOPY <1 HR PHYS/QHP: CPT | Performed by: RADIOLOGY

## 2022-06-27 PROCEDURE — 25010000002 FENTANYL CITRATE (PF) 50 MCG/ML SOLUTION: Performed by: NURSE ANESTHETIST, CERTIFIED REGISTERED

## 2022-06-27 PROCEDURE — C1769 GUIDE WIRE: HCPCS | Performed by: UROLOGY

## 2022-06-27 PROCEDURE — C2617 STENT, NON-COR, TEM W/O DEL: HCPCS | Performed by: UROLOGY

## 2022-06-27 PROCEDURE — C1894 INTRO/SHEATH, NON-LASER: HCPCS | Performed by: UROLOGY

## 2022-06-27 PROCEDURE — 25010000002 KETOROLAC TROMETHAMINE PER 15 MG: Performed by: NURSE ANESTHETIST, CERTIFIED REGISTERED

## 2022-06-27 DEVICE — URETERAL STENT
Type: IMPLANTABLE DEVICE | Site: URETER | Status: FUNCTIONAL
Brand: POLARIS™ ULTRA

## 2022-06-27 RX ORDER — KETOROLAC TROMETHAMINE 30 MG/ML
INJECTION, SOLUTION INTRAMUSCULAR; INTRAVENOUS AS NEEDED
Status: DISCONTINUED | OUTPATIENT
Start: 2022-06-27 | End: 2022-06-27 | Stop reason: SURG

## 2022-06-27 RX ORDER — KETOROLAC TROMETHAMINE 30 MG/ML
30 INJECTION, SOLUTION INTRAMUSCULAR; INTRAVENOUS EVERY 6 HOURS PRN
Status: DISCONTINUED | OUTPATIENT
Start: 2022-06-27 | End: 2022-06-27 | Stop reason: HOSPADM

## 2022-06-27 RX ORDER — PROCHLORPERAZINE EDISYLATE 5 MG/ML
INJECTION INTRAMUSCULAR; INTRAVENOUS AS NEEDED
Status: DISCONTINUED | OUTPATIENT
Start: 2022-06-27 | End: 2022-06-27 | Stop reason: SURG

## 2022-06-27 RX ORDER — MEPERIDINE HYDROCHLORIDE 25 MG/ML
INJECTION INTRAMUSCULAR; INTRAVENOUS; SUBCUTANEOUS AS NEEDED
Status: DISCONTINUED | OUTPATIENT
Start: 2022-06-27 | End: 2022-06-27 | Stop reason: SURG

## 2022-06-27 RX ORDER — MIDAZOLAM HYDROCHLORIDE 1 MG/ML
1 INJECTION INTRAMUSCULAR; INTRAVENOUS
Status: DISCONTINUED | OUTPATIENT
Start: 2022-06-27 | End: 2022-06-27 | Stop reason: HOSPADM

## 2022-06-27 RX ORDER — SODIUM CHLORIDE 0.9 % (FLUSH) 0.9 %
10 SYRINGE (ML) INJECTION AS NEEDED
Status: DISCONTINUED | OUTPATIENT
Start: 2022-06-27 | End: 2022-06-27 | Stop reason: HOSPADM

## 2022-06-27 RX ORDER — MIDAZOLAM HYDROCHLORIDE 1 MG/ML
INJECTION INTRAMUSCULAR; INTRAVENOUS AS NEEDED
Status: DISCONTINUED | OUTPATIENT
Start: 2022-06-27 | End: 2022-06-27 | Stop reason: SURG

## 2022-06-27 RX ORDER — ATROPA BELLADONNA AND OPIUM 16.2; 3 MG/1; MG/1
SUPPOSITORY RECTAL AS NEEDED
Status: DISCONTINUED | OUTPATIENT
Start: 2022-06-27 | End: 2022-06-27 | Stop reason: HOSPADM

## 2022-06-27 RX ORDER — OXYCODONE AND ACETAMINOPHEN 10; 325 MG/1; MG/1
1 TABLET ORAL EVERY 4 HOURS PRN
Qty: 12 TABLET | Refills: 0 | Status: SHIPPED | OUTPATIENT
Start: 2022-06-27 | End: 2022-08-30

## 2022-06-27 RX ORDER — ONDANSETRON 2 MG/ML
INJECTION INTRAMUSCULAR; INTRAVENOUS AS NEEDED
Status: DISCONTINUED | OUTPATIENT
Start: 2022-06-27 | End: 2022-06-27 | Stop reason: SURG

## 2022-06-27 RX ORDER — HYDROMORPHONE HCL 110MG/55ML
PATIENT CONTROLLED ANALGESIA SYRINGE INTRAVENOUS AS NEEDED
Status: DISCONTINUED | OUTPATIENT
Start: 2022-06-27 | End: 2022-06-27 | Stop reason: SURG

## 2022-06-27 RX ORDER — MAGNESIUM HYDROXIDE 1200 MG/15ML
LIQUID ORAL AS NEEDED
Status: DISCONTINUED | OUTPATIENT
Start: 2022-06-27 | End: 2022-06-27 | Stop reason: HOSPADM

## 2022-06-27 RX ORDER — SCOLOPAMINE TRANSDERMAL SYSTEM 1 MG/1
1 PATCH, EXTENDED RELEASE TRANSDERMAL ONCE
Status: DISCONTINUED | OUTPATIENT
Start: 2022-06-27 | End: 2022-06-27 | Stop reason: HOSPADM

## 2022-06-27 RX ORDER — FENTANYL CITRATE 50 UG/ML
INJECTION, SOLUTION INTRAMUSCULAR; INTRAVENOUS AS NEEDED
Status: DISCONTINUED | OUTPATIENT
Start: 2022-06-27 | End: 2022-06-27 | Stop reason: SURG

## 2022-06-27 RX ORDER — OXYCODONE HYDROCHLORIDE AND ACETAMINOPHEN 5; 325 MG/1; MG/1
1 TABLET ORAL ONCE AS NEEDED
Status: COMPLETED | OUTPATIENT
Start: 2022-06-27 | End: 2022-06-27

## 2022-06-27 RX ORDER — IPRATROPIUM BROMIDE AND ALBUTEROL SULFATE 2.5; .5 MG/3ML; MG/3ML
3 SOLUTION RESPIRATORY (INHALATION) ONCE AS NEEDED
Status: DISCONTINUED | OUTPATIENT
Start: 2022-06-27 | End: 2022-06-27 | Stop reason: HOSPADM

## 2022-06-27 RX ORDER — SODIUM CHLORIDE 0.9 % (FLUSH) 0.9 %
10 SYRINGE (ML) INJECTION EVERY 12 HOURS SCHEDULED
Status: DISCONTINUED | OUTPATIENT
Start: 2022-06-27 | End: 2022-06-27 | Stop reason: HOSPADM

## 2022-06-27 RX ORDER — FENTANYL CITRATE 50 UG/ML
50 INJECTION, SOLUTION INTRAMUSCULAR; INTRAVENOUS
Status: DISCONTINUED | OUTPATIENT
Start: 2022-06-27 | End: 2022-06-27 | Stop reason: HOSPADM

## 2022-06-27 RX ORDER — GENTAMICIN SULFATE 80 MG/100ML
80 INJECTION, SOLUTION INTRAVENOUS ONCE
Status: CANCELLED | OUTPATIENT
Start: 2022-06-27 | End: 2022-06-27

## 2022-06-27 RX ORDER — DEXAMETHASONE SODIUM PHOSPHATE 10 MG/ML
INJECTION INTRAMUSCULAR; INTRAVENOUS AS NEEDED
Status: DISCONTINUED | OUTPATIENT
Start: 2022-06-27 | End: 2022-06-27 | Stop reason: SURG

## 2022-06-27 RX ORDER — EPHEDRINE SULFATE 50 MG/ML
INJECTION, SOLUTION INTRAVENOUS AS NEEDED
Status: DISCONTINUED | OUTPATIENT
Start: 2022-06-27 | End: 2022-06-27 | Stop reason: SURG

## 2022-06-27 RX ORDER — SODIUM CHLORIDE, SODIUM LACTATE, POTASSIUM CHLORIDE, CALCIUM CHLORIDE 600; 310; 30; 20 MG/100ML; MG/100ML; MG/100ML; MG/100ML
INJECTION, SOLUTION INTRAVENOUS CONTINUOUS PRN
Status: DISCONTINUED | OUTPATIENT
Start: 2022-06-27 | End: 2022-06-27 | Stop reason: SURG

## 2022-06-27 RX ORDER — MEPERIDINE HYDROCHLORIDE 25 MG/ML
12.5 INJECTION INTRAMUSCULAR; INTRAVENOUS; SUBCUTANEOUS
Status: COMPLETED | OUTPATIENT
Start: 2022-06-27 | End: 2022-06-27

## 2022-06-27 RX ORDER — ONDANSETRON 2 MG/ML
4 INJECTION INTRAMUSCULAR; INTRAVENOUS AS NEEDED
Status: DISCONTINUED | OUTPATIENT
Start: 2022-06-27 | End: 2022-06-27 | Stop reason: HOSPADM

## 2022-06-27 RX ORDER — GENTAMICIN SULFATE 80 MG/100ML
80 INJECTION, SOLUTION INTRAVENOUS ONCE
Status: COMPLETED | OUTPATIENT
Start: 2022-06-27 | End: 2022-06-27

## 2022-06-27 RX ORDER — FAMOTIDINE 10 MG/ML
INJECTION, SOLUTION INTRAVENOUS AS NEEDED
Status: DISCONTINUED | OUTPATIENT
Start: 2022-06-27 | End: 2022-06-27 | Stop reason: SURG

## 2022-06-27 RX ORDER — SODIUM CHLORIDE, SODIUM LACTATE, POTASSIUM CHLORIDE, CALCIUM CHLORIDE 600; 310; 30; 20 MG/100ML; MG/100ML; MG/100ML; MG/100ML
125 INJECTION, SOLUTION INTRAVENOUS ONCE
Status: COMPLETED | OUTPATIENT
Start: 2022-06-27 | End: 2022-06-27

## 2022-06-27 RX ORDER — SODIUM CHLORIDE, SODIUM LACTATE, POTASSIUM CHLORIDE, CALCIUM CHLORIDE 600; 310; 30; 20 MG/100ML; MG/100ML; MG/100ML; MG/100ML
100 INJECTION, SOLUTION INTRAVENOUS ONCE AS NEEDED
Status: DISCONTINUED | OUTPATIENT
Start: 2022-06-27 | End: 2022-06-27 | Stop reason: HOSPADM

## 2022-06-27 RX ADMIN — HYDROMORPHONE HYDROCHLORIDE 0.5 MG: 2 INJECTION, SOLUTION INTRAMUSCULAR; INTRAVENOUS; SUBCUTANEOUS at 10:01

## 2022-06-27 RX ADMIN — MEPERIDINE HYDROCHLORIDE 12.5 MG: 25 INJECTION INTRAMUSCULAR; INTRAVENOUS; SUBCUTANEOUS at 10:48

## 2022-06-27 RX ADMIN — MIDAZOLAM 2 MG: 1 INJECTION INTRAMUSCULAR; INTRAVENOUS at 09:40

## 2022-06-27 RX ADMIN — SODIUM CHLORIDE, POTASSIUM CHLORIDE, SODIUM LACTATE AND CALCIUM CHLORIDE 125 ML/HR: 600; 310; 30; 20 INJECTION, SOLUTION INTRAVENOUS at 09:10

## 2022-06-27 RX ADMIN — HYDROMORPHONE HYDROCHLORIDE 0.5 MG: 2 INJECTION, SOLUTION INTRAMUSCULAR; INTRAVENOUS; SUBCUTANEOUS at 10:24

## 2022-06-27 RX ADMIN — FENTANYL CITRATE 100 MCG: 50 INJECTION INTRAMUSCULAR; INTRAVENOUS at 09:40

## 2022-06-27 RX ADMIN — SODIUM CHLORIDE, POTASSIUM CHLORIDE, SODIUM LACTATE AND CALCIUM CHLORIDE: 600; 310; 30; 20 INJECTION, SOLUTION INTRAVENOUS at 09:40

## 2022-06-27 RX ADMIN — HYDROMORPHONE HYDROCHLORIDE 0.5 MG: 2 INJECTION, SOLUTION INTRAMUSCULAR; INTRAVENOUS; SUBCUTANEOUS at 10:16

## 2022-06-27 RX ADMIN — HYDROMORPHONE HYDROCHLORIDE 1 MG: 1 INJECTION, SOLUTION INTRAMUSCULAR; INTRAVENOUS; SUBCUTANEOUS at 11:11

## 2022-06-27 RX ADMIN — OXYCODONE HYDROCHLORIDE AND ACETAMINOPHEN 1 TABLET: 5; 325 TABLET ORAL at 11:20

## 2022-06-27 RX ADMIN — MEPERIDINE HYDROCHLORIDE 12.5 MG: 25 INJECTION INTRAMUSCULAR; INTRAVENOUS; SUBCUTANEOUS at 10:54

## 2022-06-27 RX ADMIN — HYDROMORPHONE HYDROCHLORIDE 1 MG: 1 INJECTION, SOLUTION INTRAMUSCULAR; INTRAVENOUS; SUBCUTANEOUS at 11:00

## 2022-06-27 RX ADMIN — MEPERIDINE HYDROCHLORIDE 25 MG: 25 INJECTION INTRAMUSCULAR; INTRAVENOUS; SUBCUTANEOUS at 09:40

## 2022-06-27 RX ADMIN — FENTANYL CITRATE 50 MCG: 50 INJECTION INTRAMUSCULAR; INTRAVENOUS at 10:45

## 2022-06-27 RX ADMIN — FAMOTIDINE 20 MG: 10 INJECTION INTRAVENOUS at 09:40

## 2022-06-27 RX ADMIN — PROCHLORPERAZINE EDISYLATE 2.5 MG: 5 INJECTION INTRAMUSCULAR; INTRAVENOUS at 10:21

## 2022-06-27 RX ADMIN — EPHEDRINE SULFATE 10 MG: 50 INJECTION, SOLUTION INTRAVENOUS at 10:04

## 2022-06-27 RX ADMIN — DEXAMETHASONE SODIUM PHOSPHATE 8 MG: 10 INJECTION INTRAMUSCULAR; INTRAVENOUS at 10:21

## 2022-06-27 RX ADMIN — FENTANYL CITRATE 50 MCG: 50 INJECTION INTRAMUSCULAR; INTRAVENOUS at 10:52

## 2022-06-27 RX ADMIN — KETOROLAC TROMETHAMINE 30 MG: 30 INJECTION, SOLUTION INTRAMUSCULAR at 09:40

## 2022-06-27 RX ADMIN — ONDANSETRON 4 MG: 2 INJECTION INTRAMUSCULAR; INTRAVENOUS at 09:40

## 2022-06-27 RX ADMIN — HYDROMORPHONE HYDROCHLORIDE 0.5 MG: 2 INJECTION, SOLUTION INTRAMUSCULAR; INTRAVENOUS; SUBCUTANEOUS at 09:56

## 2022-06-27 RX ADMIN — SCOPALAMINE 1 PATCH: 1 PATCH, EXTENDED RELEASE TRANSDERMAL at 09:10

## 2022-06-27 RX ADMIN — GENTAMICIN SULFATE 80 MG: 80 INJECTION, SOLUTION INTRAVENOUS at 09:46

## 2022-06-27 NOTE — ANESTHESIA POSTPROCEDURE EVALUATION
Patient: Francois Griffin    Procedure Summary     Date: 06/27/22 Room / Location: [facility] OR 06 /  [facility] OR    Anesthesia Start: 0941 Anesthesia Stop: 1029    Procedure: CYSTOSCOPY URETEROSCOPY LASER LITHOTRIPSY WITH STENT INSERTION (Left ) Diagnosis:       Ureteral calculus      (Ureteral calculus [N20.1])    Surgeons: Elvin Whitley MD Provider: Zenon Bauer MD    Anesthesia Type: general ASA Status: 2          Anesthesia Type: general    Vitals  Vitals Value Taken Time   /82 06/27/22 1111   Temp 97.2 °F (36.2 °C) 06/27/22 1030   Pulse 53 06/27/22 1111   Resp 16 06/27/22 1111   SpO2 95 % 06/27/22 1111           Post Anesthesia Care and Evaluation    Patient location during evaluation: PACU  Patient participation: complete - patient participated  Level of consciousness: awake  Pain score: 0  Reason for not using neuraxial anesthesia or a peripheral nerve block: regional anesthesia not indicated:  Airway patency: patent  Anesthetic complications: No anesthetic complications  PONV Status: none  Cardiovascular status: hemodynamically stable  Respiratory status: nasal cannula  Hydration status: acceptable

## 2022-06-27 NOTE — OP NOTE
Francois Griffin  6/27/2022    Pre-op Diagnosis:   Ureteral calculus [N20.1]    Post-op Diagnosis:     Post-Op Diagnosis Codes:     * Ureteral calculus [N20.1]    Procedure/CPT® Codes:    47-year-old white male well-known to me with a history of fairly metabolically active stone disease presented about 2 weeks ago with a left 8 mm upper ureteral stone.  He had a stent placed and apparently at the time of surgery it was difficult to get up the ureter.  He has been very uncomfortable with the stone.  As well as the stent he has been to the emergency room.  He called this morning and I went ahead and added him on for surgery he has a left 8 mm stone stents in good position.  Following an informed consent I went ahead and did a cystoscopy with a 21 Russian cystoscope extracted the stent I attempted a full 4.5 Russian but could only get to the distal third of the ureter I placed an angiographic guidewire an 8 ureteral access sheath used the flexible scope identified the stone in the renal pelvis use the laser broken up and move more stymied tiny pieces I inspected each calyx.  No complications were encountered because of the amount of surgery I went ahead and placed a 5 x 24 double-J stent with an attached lanyard no plan to remove it tomorrow a belladonna and opium suppository was inserted for postop spasm.  He tolerated the procedure well  Procedure(s):  CYSTOSCOPY URETEROSCOPY LASER LITHOTRIPSY WITH STENT removal and  INSERTION    Surgeon(s):  Elvin Whitley MD    Anesthesia: see anesthesia record    Staff:   Circulator: Codey Mcghee RN; Virginia Neil RN  Scrub Person: Kimberlyn Back LPN; Christa Rubio    Estimated Blood Loss: none  Urine Voided: * No values recorded between 6/27/2022  9:41 AM and 6/27/2022 10:29 AM *    Specimens:                None      Drains: 5 x 24 double-J stent with attached and lanyard   Findings: excellent fragmentation of a calcium oxalate mono and dihydrate  stone.      Blood: N/A    Complications: None    Grafts and Implants: None    Elvin Whitley MD     Date: 6/27/2022  Time: 10:36 EDT

## 2022-06-27 NOTE — ANESTHESIA PREPROCEDURE EVALUATION
Anesthesia Evaluation     Patient summary reviewed and Nursing notes reviewed   history of anesthetic complications: PONV  NPO Solid Status: > 8 hours  NPO Liquid Status: > 8 hours           Airway   Mallampati: II  TM distance: >3 FB  Neck ROM: full  Dental - normal exam     Pulmonary - negative pulmonary ROS and normal exam   Cardiovascular   Exercise tolerance: good (4-7 METS)    Rhythm: regular  Rate: normal    (+) hypertension, hyperlipidemia,       Neuro/Psych- negative ROS  GI/Hepatic/Renal/Endo    (+) obesity,   renal disease,   (-) GI bleed    Musculoskeletal (-) negative ROS    Abdominal  - normal exam    Abdomen: soft.   Substance History - negative use     OB/GYN          Other - negative ROS                       Anesthesia Plan    ASA 2     general     intravenous induction     Anesthetic plan, risks, benefits, and alternatives have been provided, discussed and informed consent has been obtained with: patient.  Use of blood products discussed with consented to blood products.   Plan discussed with CRNA.        CODE STATUS:

## 2022-06-27 NOTE — ANESTHESIA PROCEDURE NOTES
Airway  Urgency: elective    Date/Time: 6/27/2022 9:44 AM  Airway not difficult    General Information and Staff    Patient location during procedure: OR  CRNA/CAA: Maryellen Santiago CRNA    Indications and Patient Condition  Indications for airway management: airway protection    Preoxygenated: yes  Mask difficulty assessment: 0 - not attempted    Final Airway Details  Final airway type: supraglottic airway      Successful airway: unique  Size 4    Number of attempts at approach: 1  Assessment: lips, teeth, and gum same as pre-op

## 2022-06-28 ENCOUNTER — OFFICE VISIT (OUTPATIENT)
Dept: UROLOGY | Facility: CLINIC | Age: 47
End: 2022-06-28

## 2022-06-28 ENCOUNTER — HOSPITAL ENCOUNTER (OUTPATIENT)
Dept: GENERAL RADIOLOGY | Facility: HOSPITAL | Age: 47
Discharge: HOME | End: 2022-06-28
Admitting: UROLOGY

## 2022-06-28 VITALS — HEIGHT: 69 IN | BODY MASS INDEX: 32.14 KG/M2 | WEIGHT: 217 LBS

## 2022-06-28 DIAGNOSIS — N20.1 URETERAL CALCULUS: ICD-10-CM

## 2022-06-28 DIAGNOSIS — N20.1 URETERAL CALCULUS: Primary | ICD-10-CM

## 2022-06-28 PROCEDURE — 99213 OFFICE O/P EST LOW 20 MIN: CPT | Performed by: UROLOGY

## 2022-06-28 PROCEDURE — 74018 RADEX ABDOMEN 1 VIEW: CPT

## 2022-06-28 PROCEDURE — 74018 RADEX ABDOMEN 1 VIEW: CPT | Performed by: RADIOLOGY

## 2022-08-30 ENCOUNTER — OFFICE VISIT (OUTPATIENT)
Dept: FAMILY MEDICINE CLINIC | Facility: CLINIC | Age: 47
End: 2022-08-30

## 2022-08-30 VITALS
OXYGEN SATURATION: 96 % | HEIGHT: 69 IN | WEIGHT: 212.8 LBS | SYSTOLIC BLOOD PRESSURE: 128 MMHG | BODY MASS INDEX: 31.52 KG/M2 | TEMPERATURE: 97.3 F | HEART RATE: 75 BPM | DIASTOLIC BLOOD PRESSURE: 84 MMHG

## 2022-08-30 DIAGNOSIS — M79.89 LEG SWELLING: Primary | ICD-10-CM

## 2022-08-30 DIAGNOSIS — R60.0 BILATERAL LEG EDEMA: ICD-10-CM

## 2022-08-30 DIAGNOSIS — Z13.220 ENCOUNTER FOR LIPID SCREENING FOR CARDIOVASCULAR DISEASE: ICD-10-CM

## 2022-08-30 DIAGNOSIS — Z13.6 ENCOUNTER FOR LIPID SCREENING FOR CARDIOVASCULAR DISEASE: ICD-10-CM

## 2022-08-30 PROCEDURE — 99214 OFFICE O/P EST MOD 30 MIN: CPT | Performed by: FAMILY MEDICINE

## 2022-08-30 PROCEDURE — 93000 ELECTROCARDIOGRAM COMPLETE: CPT | Performed by: FAMILY MEDICINE

## 2022-08-30 RX ORDER — FUROSEMIDE 20 MG/1
20 TABLET ORAL DAILY PRN
Qty: 90 TABLET | Refills: 1 | Status: SHIPPED | OUTPATIENT
Start: 2022-08-30

## 2022-09-13 ENCOUNTER — TELEPHONE (OUTPATIENT)
Dept: FAMILY MEDICINE CLINIC | Facility: CLINIC | Age: 47
End: 2022-09-13

## 2022-09-13 NOTE — TELEPHONE ENCOUNTER
Because I forgot to press sign. They are in now. Needs to be fasting. Sorry.    Patient notified & verbalized understanding.

## 2022-09-13 NOTE — TELEPHONE ENCOUNTER
Caller: Francois Griffin    Relationship: Self    Best call back number:  [phone]    What orders are you requesting (i.e. lab or imaging):  BLOOD WORK        Additional notes:  PATIENT WAS SEEN IN THE OFFICE ON 8/30 AND DOCTOR WOULD SUPPOSED TO PUT IN BLOOD WORK ORDERS.  STILL NOT BEEN DONE    PLEASE CALL PATIENT WHEN ORDERS HAVE BEEN PUT IN

## 2022-09-19 ENCOUNTER — LAB (OUTPATIENT)
Dept: LAB | Facility: HOSPITAL | Age: 47
End: 2022-09-19

## 2022-09-19 DIAGNOSIS — M79.89 LEG SWELLING: ICD-10-CM

## 2022-09-19 DIAGNOSIS — R60.0 BILATERAL LEG EDEMA: ICD-10-CM

## 2022-09-19 DIAGNOSIS — Z13.6 ENCOUNTER FOR LIPID SCREENING FOR CARDIOVASCULAR DISEASE: ICD-10-CM

## 2022-09-19 DIAGNOSIS — Z13.220 ENCOUNTER FOR LIPID SCREENING FOR CARDIOVASCULAR DISEASE: ICD-10-CM

## 2022-09-19 LAB
ALBUMIN SERPL-MCNC: 4.13 G/DL (ref 3.5–5.2)
ALBUMIN/GLOB SERPL: 1.5 G/DL
ALP SERPL-CCNC: 76 U/L (ref 39–117)
ALT SERPL W P-5'-P-CCNC: 20 U/L (ref 1–41)
ANION GAP SERPL CALCULATED.3IONS-SCNC: 8.3 MMOL/L (ref 5–15)
AST SERPL-CCNC: 16 U/L (ref 1–40)
BASOPHILS # BLD AUTO: 0.05 10*3/MM3 (ref 0–0.2)
BASOPHILS NFR BLD AUTO: 0.9 % (ref 0–1.5)
BILIRUB SERPL-MCNC: 0.9 MG/DL (ref 0–1.2)
BUN SERPL-MCNC: 16 MG/DL (ref 6–20)
BUN/CREAT SERPL: 17 (ref 7–25)
CALCIUM SPEC-SCNC: 9 MG/DL (ref 8.6–10.5)
CHLORIDE SERPL-SCNC: 105 MMOL/L (ref 98–107)
CHOLEST SERPL-MCNC: 213 MG/DL (ref 0–200)
CO2 SERPL-SCNC: 26.7 MMOL/L (ref 22–29)
CREAT SERPL-MCNC: 0.94 MG/DL (ref 0.76–1.27)
DEPRECATED RDW RBC AUTO: 37.1 FL (ref 37–54)
EGFRCR SERPLBLD CKD-EPI 2021: 101.9 ML/MIN/1.73
EOSINOPHIL # BLD AUTO: 0.13 10*3/MM3 (ref 0–0.4)
EOSINOPHIL NFR BLD AUTO: 2.3 % (ref 0.3–6.2)
ERYTHROCYTE [DISTWIDTH] IN BLOOD BY AUTOMATED COUNT: 12.2 % (ref 12.3–15.4)
GLOBULIN UR ELPH-MCNC: 2.8 GM/DL
GLUCOSE SERPL-MCNC: 111 MG/DL (ref 65–99)
HCT VFR BLD AUTO: 43.5 % (ref 37.5–51)
HDLC SERPL-MCNC: 38 MG/DL (ref 40–60)
HGB BLD-MCNC: 15.2 G/DL (ref 13–17.7)
IMM GRANULOCYTES # BLD AUTO: 0.02 10*3/MM3 (ref 0–0.05)
IMM GRANULOCYTES NFR BLD AUTO: 0.4 % (ref 0–0.5)
LDLC SERPL CALC-MCNC: 147 MG/DL (ref 0–100)
LDLC/HDLC SERPL: 3.78 {RATIO}
LYMPHOCYTES # BLD AUTO: 2.35 10*3/MM3 (ref 0.7–3.1)
LYMPHOCYTES NFR BLD AUTO: 41.6 % (ref 19.6–45.3)
MAGNESIUM SERPL-MCNC: 2.2 MG/DL (ref 1.6–2.6)
MCH RBC QN AUTO: 29.5 PG (ref 26.6–33)
MCHC RBC AUTO-ENTMCNC: 34.9 G/DL (ref 31.5–35.7)
MCV RBC AUTO: 84.5 FL (ref 79–97)
MONOCYTES # BLD AUTO: 0.44 10*3/MM3 (ref 0.1–0.9)
MONOCYTES NFR BLD AUTO: 7.8 % (ref 5–12)
NEUTROPHILS NFR BLD AUTO: 2.66 10*3/MM3 (ref 1.7–7)
NEUTROPHILS NFR BLD AUTO: 47 % (ref 42.7–76)
NRBC BLD AUTO-RTO: 0 /100 WBC (ref 0–0.2)
NT-PROBNP SERPL-MCNC: 17.8 PG/ML (ref 0–450)
PLATELET # BLD AUTO: 306 10*3/MM3 (ref 140–450)
PMV BLD AUTO: 9.6 FL (ref 6–12)
POTASSIUM SERPL-SCNC: 4.2 MMOL/L (ref 3.5–5.2)
PROT SERPL-MCNC: 6.9 G/DL (ref 6–8.5)
RBC # BLD AUTO: 5.15 10*6/MM3 (ref 4.14–5.8)
SODIUM SERPL-SCNC: 140 MMOL/L (ref 136–145)
TRIGL SERPL-MCNC: 156 MG/DL (ref 0–150)
TSH SERPL DL<=0.05 MIU/L-ACNC: 1.47 UIU/ML (ref 0.27–4.2)
VIT B12 BLD-MCNC: 378 PG/ML (ref 211–946)
VLDLC SERPL-MCNC: 28 MG/DL (ref 5–40)
WBC NRBC COR # BLD: 5.65 10*3/MM3 (ref 3.4–10.8)

## 2022-09-19 PROCEDURE — 83735 ASSAY OF MAGNESIUM: CPT

## 2022-09-19 PROCEDURE — 80050 GENERAL HEALTH PANEL: CPT | Performed by: FAMILY MEDICINE

## 2022-09-19 PROCEDURE — 82607 VITAMIN B-12: CPT

## 2022-09-19 PROCEDURE — 83880 ASSAY OF NATRIURETIC PEPTIDE: CPT

## 2022-09-19 PROCEDURE — 36415 COLL VENOUS BLD VENIPUNCTURE: CPT

## 2022-09-19 PROCEDURE — 80061 LIPID PANEL: CPT

## 2022-09-20 ENCOUNTER — LAB (OUTPATIENT)
Dept: LAB | Facility: HOSPITAL | Age: 47
End: 2022-09-20

## 2022-09-20 DIAGNOSIS — R73.09 ELEVATED GLUCOSE: ICD-10-CM

## 2022-09-20 DIAGNOSIS — R73.09 ELEVATED GLUCOSE: Primary | ICD-10-CM

## 2022-09-20 LAB — HBA1C MFR BLD: 5.3 % (ref 4.8–5.6)

## 2022-09-20 PROCEDURE — 83036 HEMOGLOBIN GLYCOSYLATED A1C: CPT

## 2022-09-20 PROCEDURE — 36415 COLL VENOUS BLD VENIPUNCTURE: CPT

## 2022-09-20 NOTE — TELEPHONE ENCOUNTER
----- Message from Holly Rodriguez MD sent at 9/20/2022  8:18 AM EDT -----  His glucose is a little bit elevated. Is there any way to add on an A1C? The order is in.    The 10-year ASCVD risk score (Edson HANKINS Jr., et al., 2013) is: 3.7%    Values used to calculate the score:      Age: 47 years      Sex: Male      Is Non- : No      Diabetic: No      Tobacco smoker: No      Systolic Blood Pressure: 128 mmHg      Is BP treated: Yes      HDL Cholesterol: 38 mg/dL      Total Cholesterol: 213 mg/dL    Couldn't add A1C on but spoke with patient he will stop by and have it done.

## 2022-09-21 ENCOUNTER — TELEPHONE (OUTPATIENT)
Dept: FAMILY MEDICINE CLINIC | Facility: CLINIC | Age: 47
End: 2022-09-21

## 2022-09-21 NOTE — TELEPHONE ENCOUNTER
----- Message from Holly Rodriguez MD sent at 9/21/2022 12:38 AM EDT -----  Please call. Let him know that all labs are okay. How are his legs now? I'm thinking his work days are starting to take a toll on him. Is the lasix helping?       Spoke with patient & he verbalized understanding,reports the swelling is improved with the Lasix.

## 2022-10-03 ENCOUNTER — TELEPHONE (OUTPATIENT)
Dept: FAMILY MEDICINE CLINIC | Facility: CLINIC | Age: 47
End: 2022-10-03

## 2022-10-03 NOTE — TELEPHONE ENCOUNTER
----- Message from Holly Rodriguez MD sent at 10/2/2022 11:22 PM EDT -----  Please call Marcial. Let him know that all labs are fine; however, his cholesterol has jumped up about 20 points over the last year. No meds; he is borderline, but please work on a low fat diet and exercise.     The 10-year ASCVD risk score (Edson HANKINS Jr., et al., 2013) is: 3.7%    Values used to calculate the score:      Age: 47 years      Sex: Male      Is Non- : No      Diabetic: No      Tobacco smoker: No      Systolic Blood Pressure: 128 mmHg      Is BP treated: Yes      HDL Cholesterol: 38 mg/dL      Total Cholesterol: 213 mg/dL    Patient notified & verbalized understanding.

## 2022-11-10 ENCOUNTER — TELEPHONE (OUTPATIENT)
Dept: FAMILY MEDICINE CLINIC | Facility: CLINIC | Age: 47
End: 2022-11-10

## 2022-11-10 DIAGNOSIS — I10 ESSENTIAL HYPERTENSION: ICD-10-CM

## 2022-11-10 RX ORDER — ATENOLOL 50 MG/1
50 TABLET ORAL DAILY
Qty: 180 TABLET | Refills: 1 | Status: CANCELLED | OUTPATIENT
Start: 2022-11-10

## 2022-11-10 RX ORDER — ATENOLOL 50 MG/1
50 TABLET ORAL 2 TIMES DAILY
Qty: 180 TABLET | Refills: 1 | Status: SHIPPED | OUTPATIENT
Start: 2022-11-10

## 2022-11-10 NOTE — TELEPHONE ENCOUNTER
Caller: Francois Griffin    Relationship: Self    Best call back number: [phone]    Requested Prescriptions:   Requested Prescriptions      No prescriptions requested or ordered in this encounter      atenolol (TENORMIN) 50 MG tablet    Pharmacy where request should be sent:      [facility]     Does the patient have less than a 3 day supply:  [] Yes  [x] No    Judith Manuel Rep   11/10/22 14:25 EST

## 2022-11-11 NOTE — TELEPHONE ENCOUNTER
Attempted to contact the pt but he did not answer. Will have to attempt again at another time.      Patient notified.

## 2022-11-23 ENCOUNTER — HOSPITAL ENCOUNTER (OUTPATIENT)
Dept: GENERAL RADIOLOGY | Facility: HOSPITAL | Age: 47
Discharge: HOME | End: 2022-11-23

## 2022-11-23 ENCOUNTER — OFFICE VISIT (OUTPATIENT)
Dept: FAMILY MEDICINE CLINIC | Facility: CLINIC | Age: 47
End: 2022-11-23

## 2022-11-23 VITALS
WEIGHT: 213.4 LBS | BODY MASS INDEX: 31.61 KG/M2 | HEIGHT: 69 IN | DIASTOLIC BLOOD PRESSURE: 84 MMHG | TEMPERATURE: 96.8 F | HEART RATE: 85 BPM | OXYGEN SATURATION: 99 % | SYSTOLIC BLOOD PRESSURE: 118 MMHG

## 2022-11-23 DIAGNOSIS — I10 ESSENTIAL HYPERTENSION: ICD-10-CM

## 2022-11-23 DIAGNOSIS — M79.671 RIGHT FOOT PAIN: Primary | ICD-10-CM

## 2022-11-23 PROCEDURE — 73610 X-RAY EXAM OF ANKLE: CPT

## 2022-11-23 PROCEDURE — 99214 OFFICE O/P EST MOD 30 MIN: CPT | Performed by: FAMILY MEDICINE

## 2022-11-23 PROCEDURE — 73610 X-RAY EXAM OF ANKLE: CPT | Performed by: RADIOLOGY

## 2022-11-23 PROCEDURE — 73630 X-RAY EXAM OF FOOT: CPT

## 2022-11-23 PROCEDURE — 73630 X-RAY EXAM OF FOOT: CPT | Performed by: RADIOLOGY

## 2022-12-07 ENCOUNTER — TELEPHONE (OUTPATIENT)
Dept: FAMILY MEDICINE CLINIC | Facility: CLINIC | Age: 47
End: 2022-12-07

## 2022-12-07 NOTE — TELEPHONE ENCOUNTER
We had discussed the air boot, but I hadn't offered him one secondary to the frenetic pace of his job. If he wants one to try to take the pressure off, I would be glad to send a script to somewhere (or he could get one off of [employer], which honestly is probably cheaper).    Does he have anything metallic in him? The MRI order is flagging it and giving me a hard stop?

## 2022-12-07 NOTE — TELEPHONE ENCOUNTER
We had discussed the air boot, but I hadn't offered him one secondary to the frenetic pace of his job. If he wants one to try to take the pressure off, I would be glad to send a script to somewhere (or he could get one off of [employer], which honestly is probably cheaper).    Does he have anything metallic in him? The MRI order is flagging it and giving me a hard stop?    Spoke with patient he has no metal in his body,will get the boot from [employer].

## 2022-12-07 NOTE — TELEPHONE ENCOUNTER
----- Message from Holly Rodriguez MD sent at 12/7/2022  2:38 AM EST -----  Please call Marical. Xrays of foot were normal. Did he get the air boot? Is it still hurting? Thinking of getting a MRI.        Spoke with patient & he verbalized understanding,reports he did not have an order for an air boot?,still hurting,agreeable to MRI.

## 2022-12-08 DIAGNOSIS — M79.671 RIGHT FOOT PAIN: Primary | ICD-10-CM

## 2022-12-08 DIAGNOSIS — M25.571 ACUTE RIGHT ANKLE PAIN: ICD-10-CM

## 2022-12-14 DIAGNOSIS — N40.0 BENIGN PROSTATIC HYPERPLASIA WITHOUT LOWER URINARY TRACT SYMPTOMS: Primary | ICD-10-CM

## 2022-12-14 RX ORDER — TADALAFIL 20 MG/1
20 TABLET ORAL AS NEEDED
Qty: 20 TABLET | Refills: 1 | Status: SHIPPED | OUTPATIENT
Start: 2022-12-14

## 2022-12-17 ENCOUNTER — APPOINTMENT (OUTPATIENT)
Dept: CT IMAGING | Facility: HOSPITAL | Age: 47
End: 2022-12-17

## 2022-12-17 ENCOUNTER — HOSPITAL ENCOUNTER (EMERGENCY)
Facility: HOSPITAL | Age: 47
Discharge: HOME | End: 2022-12-17
Attending: EMERGENCY MEDICINE | Admitting: EMERGENCY MEDICINE

## 2022-12-17 ENCOUNTER — TRANSCRIBE ORDERS (OUTPATIENT)
Dept: ADMINISTRATIVE | Facility: HOSPITAL | Age: 47
End: 2022-12-17

## 2022-12-17 VITALS
BODY MASS INDEX: 31.4 KG/M2 | SYSTOLIC BLOOD PRESSURE: 117 MMHG | HEIGHT: 69 IN | OXYGEN SATURATION: 98 % | RESPIRATION RATE: 16 BRPM | HEART RATE: 68 BPM | WEIGHT: 212 LBS | DIASTOLIC BLOOD PRESSURE: 77 MMHG | TEMPERATURE: 97.1 F

## 2022-12-17 DIAGNOSIS — M25.512 LEFT SHOULDER PAIN, UNSPECIFIED CHRONICITY: ICD-10-CM

## 2022-12-17 DIAGNOSIS — N20.1 URETEROLITHIASIS: Primary | ICD-10-CM

## 2022-12-17 DIAGNOSIS — M54.50 LEFT LOW BACK PAIN, UNSPECIFIED CHRONICITY, UNSPECIFIED WHETHER SCIATICA PRESENT: Primary | ICD-10-CM

## 2022-12-17 LAB
ALBUMIN SERPL-MCNC: 4.21 G/DL (ref 3.5–5.2)
ALBUMIN/GLOB SERPL: 1.4 G/DL
ALP SERPL-CCNC: 83 U/L (ref 39–117)
ALT SERPL W P-5'-P-CCNC: 30 U/L (ref 1–41)
ANION GAP SERPL CALCULATED.3IONS-SCNC: 11.7 MMOL/L (ref 5–15)
AST SERPL-CCNC: 23 U/L (ref 1–40)
BASOPHILS # BLD AUTO: 0.06 10*3/MM3 (ref 0–0.2)
BASOPHILS NFR BLD AUTO: 0.6 % (ref 0–1.5)
BILIRUB SERPL-MCNC: 1 MG/DL (ref 0–1.2)
BUN SERPL-MCNC: 14 MG/DL (ref 6–20)
BUN/CREAT SERPL: 13.5 (ref 7–25)
CALCIUM SPEC-SCNC: 9.4 MG/DL (ref 8.6–10.5)
CHLORIDE SERPL-SCNC: 103 MMOL/L (ref 98–107)
CO2 SERPL-SCNC: 21.3 MMOL/L (ref 22–29)
CREAT SERPL-MCNC: 1.04 MG/DL (ref 0.76–1.27)
DEPRECATED RDW RBC AUTO: 38.1 FL (ref 37–54)
EGFRCR SERPLBLD CKD-EPI 2021: 90.2 ML/MIN/1.73
EOSINOPHIL # BLD AUTO: 0.05 10*3/MM3 (ref 0–0.4)
EOSINOPHIL NFR BLD AUTO: 0.5 % (ref 0.3–6.2)
ERYTHROCYTE [DISTWIDTH] IN BLOOD BY AUTOMATED COUNT: 12.6 % (ref 12.3–15.4)
GLOBULIN UR ELPH-MCNC: 3.1 GM/DL
GLUCOSE SERPL-MCNC: 107 MG/DL (ref 65–99)
HCT VFR BLD AUTO: 44.9 % (ref 37.5–51)
HGB BLD-MCNC: 15.8 G/DL (ref 13–17.7)
IMM GRANULOCYTES # BLD AUTO: 0.03 10*3/MM3 (ref 0–0.05)
IMM GRANULOCYTES NFR BLD AUTO: 0.3 % (ref 0–0.5)
LYMPHOCYTES # BLD AUTO: 1.91 10*3/MM3 (ref 0.7–3.1)
LYMPHOCYTES NFR BLD AUTO: 19.4 % (ref 19.6–45.3)
MCH RBC QN AUTO: 29.7 PG (ref 26.6–33)
MCHC RBC AUTO-ENTMCNC: 35.2 G/DL (ref 31.5–35.7)
MCV RBC AUTO: 84.4 FL (ref 79–97)
MONOCYTES # BLD AUTO: 0.55 10*3/MM3 (ref 0.1–0.9)
MONOCYTES NFR BLD AUTO: 5.6 % (ref 5–12)
NEUTROPHILS NFR BLD AUTO: 7.23 10*3/MM3 (ref 1.7–7)
NEUTROPHILS NFR BLD AUTO: 73.6 % (ref 42.7–76)
NRBC BLD AUTO-RTO: 0 /100 WBC (ref 0–0.2)
PLATELET # BLD AUTO: 330 10*3/MM3 (ref 140–450)
PMV BLD AUTO: 9.8 FL (ref 6–12)
POTASSIUM SERPL-SCNC: 4.4 MMOL/L (ref 3.5–5.2)
PROT SERPL-MCNC: 7.3 G/DL (ref 6–8.5)
RBC # BLD AUTO: 5.32 10*6/MM3 (ref 4.14–5.8)
SODIUM SERPL-SCNC: 136 MMOL/L (ref 136–145)
WBC NRBC COR # BLD: 9.83 10*3/MM3 (ref 3.4–10.8)

## 2022-12-17 PROCEDURE — 80053 COMPREHEN METABOLIC PANEL: CPT | Performed by: NURSE PRACTITIONER

## 2022-12-17 PROCEDURE — 25010000002 ONDANSETRON PER 1 MG: Performed by: NURSE PRACTITIONER

## 2022-12-17 PROCEDURE — 74176 CT ABD & PELVIS W/O CONTRAST: CPT | Performed by: RADIOLOGY

## 2022-12-17 PROCEDURE — 25010000002 HYDROMORPHONE 1 MG/ML SOLUTION: Performed by: NURSE PRACTITIONER

## 2022-12-17 PROCEDURE — 96374 THER/PROPH/DIAG INJ IV PUSH: CPT

## 2022-12-17 PROCEDURE — 96361 HYDRATE IV INFUSION ADD-ON: CPT

## 2022-12-17 PROCEDURE — 85025 COMPLETE CBC W/AUTO DIFF WBC: CPT | Performed by: NURSE PRACTITIONER

## 2022-12-17 PROCEDURE — 99283 EMERGENCY DEPT VISIT LOW MDM: CPT

## 2022-12-17 PROCEDURE — 74176 CT ABD & PELVIS W/O CONTRAST: CPT

## 2022-12-17 PROCEDURE — 25010000002 KETOROLAC TROMETHAMINE PER 15 MG: Performed by: NURSE PRACTITIONER

## 2022-12-17 PROCEDURE — 96375 TX/PRO/DX INJ NEW DRUG ADDON: CPT

## 2022-12-17 RX ORDER — ONDANSETRON 2 MG/ML
4 INJECTION INTRAMUSCULAR; INTRAVENOUS ONCE
Status: COMPLETED | OUTPATIENT
Start: 2022-12-17 | End: 2022-12-17

## 2022-12-17 RX ORDER — KETOROLAC TROMETHAMINE 30 MG/ML
30 INJECTION, SOLUTION INTRAMUSCULAR; INTRAVENOUS ONCE
Status: COMPLETED | OUTPATIENT
Start: 2022-12-17 | End: 2022-12-17

## 2022-12-17 RX ORDER — TAMSULOSIN HYDROCHLORIDE 0.4 MG/1
1 CAPSULE ORAL DAILY
Qty: 15 CAPSULE | Refills: 0 | Status: SHIPPED | OUTPATIENT
Start: 2022-12-17

## 2022-12-17 RX ORDER — CEPHALEXIN 500 MG/1
500 CAPSULE ORAL 3 TIMES DAILY
Qty: 15 CAPSULE | Refills: 0 | Status: SHIPPED | OUTPATIENT
Start: 2022-12-17 | End: 2022-12-22

## 2022-12-17 RX ORDER — SODIUM CHLORIDE 0.9 % (FLUSH) 0.9 %
10 SYRINGE (ML) INJECTION AS NEEDED
Status: DISCONTINUED | OUTPATIENT
Start: 2022-12-17 | End: 2022-12-17 | Stop reason: HOSPADM

## 2022-12-17 RX ORDER — ONDANSETRON 4 MG/1
4 TABLET, ORALLY DISINTEGRATING ORAL EVERY 6 HOURS PRN
Qty: 12 TABLET | Refills: 0 | Status: SHIPPED | OUTPATIENT
Start: 2022-12-17 | End: 2023-02-03

## 2022-12-17 RX ORDER — OXYCODONE HYDROCHLORIDE AND ACETAMINOPHEN 5; 325 MG/1; MG/1
1-2 TABLET ORAL EVERY 6 HOURS PRN
Qty: 16 TABLET | Refills: 0 | Status: SHIPPED | OUTPATIENT
Start: 2022-12-17

## 2022-12-17 RX ADMIN — HYDROMORPHONE HYDROCHLORIDE 1 MG: 1 INJECTION, SOLUTION INTRAMUSCULAR; INTRAVENOUS; SUBCUTANEOUS at 11:26

## 2022-12-17 RX ADMIN — SODIUM CHLORIDE 1000 ML: 9 INJECTION, SOLUTION INTRAVENOUS at 11:26

## 2022-12-17 RX ADMIN — KETOROLAC TROMETHAMINE 30 MG: 30 INJECTION, SOLUTION INTRAMUSCULAR; INTRAVENOUS at 11:47

## 2022-12-17 RX ADMIN — ONDANSETRON 4 MG: 2 INJECTION INTRAMUSCULAR; INTRAVENOUS at 11:26

## 2022-12-17 NOTE — ED PROVIDER NOTES
Subjective     Flank Pain  Pain location:  R flank  Pain quality: sharp    Pain severity:  Severe  Onset quality:  Gradual  Timing:  Constant  Progression:  Worsening  Chronicity:  Recurrent (Patient has a history of ureterolithiasis)  Context: not alcohol use and not diet changes    Relieved by:  Nothing  Worsened by:  Nothing  Ineffective treatments:  None tried  Associated symptoms: nausea and vomiting    Associated symptoms: no anorexia, no chest pain, no cough, no fatigue, no flatus and no hematochezia    Risk factors: no alcohol abuse, no NSAID use and not pregnant        Review of Systems   Constitutional: Negative.  Negative for fatigue.   HENT: Negative.    Eyes: Negative.    Respiratory: Negative.  Negative for cough.    Cardiovascular: Negative.  Negative for chest pain.   Gastrointestinal: Positive for nausea and vomiting. Negative for anorexia, flatus and hematochezia.   Endocrine: Negative.    Genitourinary: Positive for flank pain.   Skin: Negative.    Allergic/Immunologic: Negative.    Neurological: Negative.    Hematological: Negative.    Psychiatric/Behavioral: Negative.        Past Medical History:   Diagnosis Date   • Elevated cholesterol    • Hyperlipidemia    • Hypertension    • Kidney stone    • PONV (postoperative nausea and vomiting)        No Known Allergies    Past Surgical History:   Procedure Laterality Date   • APPENDECTOMY     • CHOLECYSTECTOMY     • COLONOSCOPY N/A 3/16/2021    Procedure: COLONOSCOPY WITH BIOPSY;  Surgeon: Mary Cosme MD;  Location: [facility];  Service: Gastroenterology;  Laterality: N/A;   • CYSTOSCOPY URETEROSCOPY LASER LITHOTRIPSY Left 6/27/2022    Procedure: CYSTOSCOPY URETEROSCOPY LASER LITHOTRIPSY WITH STENT INSERTION;  Surgeon: Elvin Whitley MD;  Location: [facility];  Service: Urology;  Laterality: Left;   • CYSTOSCOPY, RETROGRADE PYELOGRAM AND STENT INSERTION Left 6/20/2022    Procedure: CYSTOSCOPY RETROGRADE PYELOGRAM AND STENT  INSERTION;  Surgeon: Scar Luna MD;  Location: [facility] OR;  Service: Urology;  Laterality: Left;   • EXTRACORPOREAL SHOCK WAVE LITHOTRIPSY (ESWL) Right 10/12/2018    Procedure: EXTRACORPOREAL SHOCKWAVE LITHOTRIPSY;  Surgeon: Elvin Whitley MD;  Location: [facility] OR;  Service: Urology   • VOCAL CORD BIOPSY         Family History   Problem Relation Age of Onset   • Heart disease Mother    • Hypertension Mother    • Diabetes Mother    • Hypertension Father    • Arthritis Father    • Skin cancer Father    • Cancer Maternal Grandmother         colon   • Cancer Maternal Grandfather         Lung   • Stroke Maternal Grandfather    • Breast cancer Paternal Grandmother         Gastric cancer   • Cancer Paternal Grandfather         Mets to bone, unknown primary   • Lymphoma Paternal Uncle        Social History     Socioeconomic History   • Marital status:    Tobacco Use   • Smoking status: Never   • Smokeless tobacco: Never   Substance and Sexual Activity   • Alcohol use: No   • Drug use: No   • Sexual activity: Defer           Objective   Physical Exam  Vitals and nursing note reviewed.   Constitutional:       Appearance: He is well-developed.   HENT:      Head: Normocephalic.      Right Ear: External ear normal.      Left Ear: External ear normal.   Eyes:      Conjunctiva/sclera: Conjunctivae normal.      Pupils: Pupils are equal, round, and reactive to light.   Cardiovascular:      Rate and Rhythm: Normal rate and regular rhythm.      Heart sounds: Normal heart sounds.   Pulmonary:      Effort: Pulmonary effort is normal.      Breath sounds: Normal breath sounds.   Abdominal:      General: Bowel sounds are normal.      Palpations: Abdomen is soft.   Musculoskeletal:         General: Normal range of motion.      Cervical back: Normal range of motion and neck supple.   Skin:     General: Skin is warm and dry.      Capillary Refill: Capillary refill takes less than 2 seconds.   Neurological:      Mental  Status: He is alert and oriented to person, place, and time.   Psychiatric:         Behavior: Behavior normal.         Thought Content: Thought content normal.         Procedures           ED Course                                           MDM    Final diagnoses:   Ureterolithiasis       ED Disposition  ED Disposition     ED Disposition   Discharge    Condition   Stable    Comment   --             Holly Rodriguez MD  [address] [place] [address]  [phone]    Schedule an appointment as soon as possible for a visit   For further evaluation         Medication List      New Prescriptions    cephalexin 500 MG capsule  Commonly known as: KEFLEX  Take 1 capsule by mouth 3 (Three) Times a Day for 5 days.     ondansetron ODT 4 MG disintegrating tablet  Commonly known as: ZOFRAN-ODT  Place 1 tablet on the tongue Every 6 (Six) Hours As Needed for Vomiting or Nausea.     oxyCODONE-acetaminophen 5-325 MG per tablet  Commonly known as: PERCOCET  Take 1-2 tablets by mouth Every 6 (Six) Hours As Needed for Severe Pain.     tamsulosin 0.4 MG capsule 24 hr capsule  Commonly known as: FLOMAX  Take 1 capsule by mouth Daily.           Where to Get Your Medications      These medications were sent to [facility] [address] [place] [address] - [phone]  - [phone] [address] [address]    Phone: [phone]   · cephalexin 500 MG capsule  · ondansetron ODT 4 MG disintegrating tablet  · oxyCODONE-acetaminophen 5-325 MG per tablet  · tamsulosin 0.4 MG capsule 24 hr capsule          Seun Keller P, APRN  12/17/22 [id number]

## 2023-01-09 DIAGNOSIS — N20.0 KIDNEY STONE: Primary | ICD-10-CM

## 2023-02-01 ENCOUNTER — TELEPHONE (OUTPATIENT)
Dept: FAMILY MEDICINE CLINIC | Facility: CLINIC | Age: 48
End: 2023-02-01
Payer: COMMERCIAL

## 2023-02-03 RX ORDER — ONDANSETRON 4 MG/1
4 TABLET, ORALLY DISINTEGRATING ORAL EVERY 6 HOURS PRN
Qty: 30 TABLET | Refills: 2 | Status: SHIPPED | OUTPATIENT
Start: 2023-02-03

## 2023-04-18 DIAGNOSIS — I10 ESSENTIAL HYPERTENSION: ICD-10-CM

## 2023-04-18 RX ORDER — ATENOLOL 50 MG/1
50 TABLET ORAL 2 TIMES DAILY
Qty: 180 TABLET | Refills: 0 | Status: SHIPPED | OUTPATIENT
Start: 2023-04-18

## 2023-09-13 DIAGNOSIS — N40.0 BENIGN PROSTATIC HYPERPLASIA WITHOUT LOWER URINARY TRACT SYMPTOMS: ICD-10-CM

## 2023-09-13 RX ORDER — TADALAFIL 20 MG/1
20 TABLET ORAL AS NEEDED
Qty: 20 TABLET | Refills: 1 | Status: SHIPPED | OUTPATIENT
Start: 2023-09-13

## 2023-09-13 RX ORDER — ONDANSETRON 4 MG/1
4 TABLET, ORALLY DISINTEGRATING ORAL EVERY 6 HOURS PRN
Qty: 30 TABLET | Refills: 2 | Status: SHIPPED | OUTPATIENT
Start: 2023-09-13

## 2023-09-22 ENCOUNTER — TELEPHONE (OUTPATIENT)
Dept: FAMILY MEDICINE CLINIC | Facility: CLINIC | Age: 48
End: 2023-09-22
Payer: COMMERCIAL

## 2023-09-22 DIAGNOSIS — G54.2 CERVICAL NEUROPATHY: Primary | ICD-10-CM

## 2023-09-22 RX ORDER — METHYLPREDNISOLONE 4 MG/1
1 TABLET ORAL DAILY
Qty: 21 TABLET | Refills: 0 | Status: SHIPPED | OUTPATIENT
Start: 2023-09-22

## 2023-09-22 NOTE — TELEPHONE ENCOUNTER
"Marcial called requesting that Dr. Rodriguez place an order for an MRI. States that he has what he originally \"thought\" was crick in his neck, but now pain is radiating down his back and arms, making his hands go numb. Please advise  "

## 2023-09-22 NOTE — TELEPHONE ENCOUNTER
Patient states he has never had a MRI but as far as he knows there is no reason he can't he has only ever had his gallbladder out and dental implant.

## 2023-09-22 NOTE — TELEPHONE ENCOUNTER
His chart Is being flagged for something metallic in his body? But he's gotten MRIs previously. Please clarify. Thanks.

## 2023-09-24 ENCOUNTER — HOSPITAL ENCOUNTER (OUTPATIENT)
Dept: MRI IMAGING | Facility: HOSPITAL | Age: 48
Discharge: HOME | End: 2023-09-24
Admitting: FAMILY MEDICINE

## 2023-09-24 DIAGNOSIS — G54.2 CERVICAL NEUROPATHY: ICD-10-CM

## 2023-09-24 PROCEDURE — 72141 MRI NECK SPINE W/O DYE: CPT

## 2023-09-24 PROCEDURE — 72141 MRI NECK SPINE W/O DYE: CPT | Performed by: RADIOLOGY

## 2023-09-25 ENCOUNTER — TELEPHONE (OUTPATIENT)
Dept: FAMILY MEDICINE CLINIC | Facility: CLINIC | Age: 48
End: 2023-09-25

## 2023-09-25 ENCOUNTER — OFFICE VISIT (OUTPATIENT)
Dept: FAMILY MEDICINE CLINIC | Facility: CLINIC | Age: 48
End: 2023-09-25
Payer: COMMERCIAL

## 2023-09-25 VITALS
WEIGHT: 214.2 LBS | OXYGEN SATURATION: 96 % | SYSTOLIC BLOOD PRESSURE: 128 MMHG | HEIGHT: 69 IN | DIASTOLIC BLOOD PRESSURE: 86 MMHG | BODY MASS INDEX: 31.73 KG/M2 | HEART RATE: 87 BPM | TEMPERATURE: 97.8 F

## 2023-09-25 DIAGNOSIS — I10 ESSENTIAL HYPERTENSION: ICD-10-CM

## 2023-09-25 DIAGNOSIS — M50.30 ANNULAR TEAR OF CERVICAL DISC: Primary | ICD-10-CM

## 2023-09-25 PROCEDURE — 99214 OFFICE O/P EST MOD 30 MIN: CPT | Performed by: FAMILY MEDICINE

## 2023-09-25 RX ORDER — HYDROCODONE BITARTRATE AND ACETAMINOPHEN 5; 325 MG/1; MG/1
1 TABLET ORAL EVERY 8 HOURS PRN
Qty: 60 TABLET | Refills: 0 | Status: SHIPPED | OUTPATIENT
Start: 2023-09-25

## 2023-09-25 RX ORDER — CYCLOBENZAPRINE HCL 10 MG
10 TABLET ORAL 2 TIMES DAILY PRN
Qty: 60 TABLET | Refills: 0 | Status: SHIPPED | OUTPATIENT
Start: 2023-09-25

## 2023-09-25 RX ORDER — ATENOLOL 50 MG/1
50 TABLET ORAL 2 TIMES DAILY
Qty: 180 TABLET | Refills: 1 | Status: SHIPPED | OUTPATIENT
Start: 2023-09-25

## 2023-09-25 RX ORDER — NABUMETONE 500 MG/1
500 TABLET, FILM COATED ORAL 2 TIMES DAILY PRN
Qty: 60 TABLET | Refills: 0 | Status: SHIPPED | OUTPATIENT
Start: 2023-09-25

## 2023-09-25 NOTE — PROGRESS NOTES
"Francois Griffin     VITALS: Blood pressure 128/86, pulse 87, temperature 97.8 øF (36.6 øC), temperature source Temporal, height 175.3 cm (69\"), weight 97.2 kg (214 lb 3.2 oz), SpO2 96 %.    Subjective  Chief Complaint  MRI Results    Subjective          History of Present Illness:  Patient is a 48 y.o.  male with medical conditions significant for hypertension and allergic rhinitis who presents to clinic secondary to medical followup.     He has been having paresthesia and tingling in the bilateral upper extremities and lower extremities.    Paresthesia of bilateral upper extremities and lower extremities  The steroids have been helpful. He is feeling better today than it has been. He was prescribed a 3-to-4-day steroid pack. It has been having issues for about 2 to 3 months. The patient's symptoms went away and then it came back about 2 weeks ago. He had an MRI 09/24/2023. He could hardly move his arm. He notes these symptoms scared him as 2 of his digits started feeling swollen. He would rather finish the steroid pack , take anti-inflammatories, and attend physical therapy. The patient does not want to pursue back surgeries and neck surgeries. He would like to try a more conservative treatment path right now. He has been taking over-the-counter Advil. When the patient called the office on 09/22/2023, his digits were numb and the pain was radiating down his upper extremities down into his humerus and into his wrists.  He has had numbness in his right thigh for years. He could stab himself with a knife in his thigh and he would not feel it. The patient's left thigh is fine.      No complaints about any of the medications.    The following portions of the patient's history were reviewed and updated as appropriate: allergies, current medications, past family history, past medical history, past social history, past surgical history and problem list.    Past Medical History  Past Medical History:   Diagnosis Date    " Elevated cholesterol     Hyperlipidemia     Hypertension     Kidney stone     PONV (postoperative nausea and vomiting)        Surgical History  Past Surgical History:   Procedure Laterality Date    APPENDECTOMY      CHOLECYSTECTOMY      COLONOSCOPY N/A 3/16/2021    Procedure: COLONOSCOPY WITH BIOPSY;  Surgeon: Mary Cosme MD;  Location: [facility];  Service: Gastroenterology;  Laterality: N/A;    CYSTOSCOPY URETEROSCOPY LASER LITHOTRIPSY Left 6/27/2022    Procedure: CYSTOSCOPY URETEROSCOPY LASER LITHOTRIPSY WITH STENT INSERTION;  Surgeon: Elvin Whitley MD;  Location: [facility];  Service: Urology;  Laterality: Left;    CYSTOSCOPY, RETROGRADE PYELOGRAM AND STENT INSERTION Left 6/20/2022    Procedure: CYSTOSCOPY RETROGRADE PYELOGRAM AND STENT INSERTION;  Surgeon: Scar Luna MD;  Location: [facility];  Service: Urology;  Laterality: Left;    EXTRACORPOREAL SHOCK WAVE LITHOTRIPSY (ESWL) Right 10/12/2018    Procedure: EXTRACORPOREAL SHOCKWAVE LITHOTRIPSY;  Surgeon: Elvin Whitley MD;  Location: [facility];  Service: Urology    VOCAL CORD BIOPSY         Family History  Family History   Problem Relation Age of Onset    Heart disease Mother     Hypertension Mother     Diabetes Mother     Hypertension Father     Arthritis Father     Skin cancer Father     Cancer Maternal Grandmother         colon    Cancer Maternal Grandfather         Lung    Stroke Maternal Grandfather     Breast cancer Paternal Grandmother         Gastric cancer    Cancer Paternal Grandfather         Mets to bone, unknown primary    Lymphoma Paternal Uncle        Social History  Social History     Socioeconomic History    Marital status:    Tobacco Use    Smoking status: Never    Smokeless tobacco: Never   Substance and Sexual Activity    Alcohol use: No    Drug use: No    Sexual activity: Defer       Objective   Vital Signs:   /86 (BP Location: Right arm, Patient Position: Sitting, Cuff Size: Adult)   Pulse  "87   Temp 97.8 øF (36.6 øC) (Temporal)   Ht 175.3 cm (69\")   Wt 97.2 kg (214 lb 3.2 oz)   SpO2 96%   BMI 31.63 kg/mý     Physical Exam     Gen: Patient in NAD. Pleasant and answers appropriately. A&Ox3.    Skin: Warm and dry with normal turgor. No purpura, rashes, or unusual pigmentation noted. Hair is normal in appearance and distribution.    HEENT: NC/AT. No lesions noted. Conjunctiva clear, sclera nonicteric. PERRL. EOMI without nystagmus or strabismus. Fundi appear benign. No hemorrhages or exudates of eyes. Auditory canals are patent bilaterally without lesions. TMs intact,  nonerythematous, nonbulging without lesions. Nasal mucosa pink, nonerythematous, and nonedematous. Frontal and maxillary sinuses are nontender. O/P nonerythematous and moist without exudate.    Neck: Supple without lymph nodes palpated. FROM.     Lungs: CTA B/L without rales, rhonchi, crackles, or wheezes.    Heart: RRR. S1 and S2 normal. No S3 or S4. No MRGT.    Abd: Soft, nontender,nondistended. (+)BSx4 quadrants.     Extrem: No CCE. Radial pulses 2+/4 and equal B/L. FROMx4.  Diffuse positive neuropathy in the bilateral lower extremities.    Back: Decreased range of motion.    Procedures    Result Review :   The following data was reviewed by: Holly Rodriguez MD  on 09/25/2023:              Assessment and Plan    Francois Griffin is a 48 y.o. here for medical followup.  Diagnoses and all orders for this visit:    1. Annular tear of cervical disc (Primary)  -     HYDROcodone-acetaminophen (NORCO) 5-325 MG per tablet; Take 1 tablet by mouth Every 8 (Eight) Hours As Needed for Moderate Pain.  Dispense: 60 tablet; Refill: 0  -     Ambulatory Referral to Neurosurgery  -     cyclobenzaprine (FLEXERIL) 10 MG tablet; Take 1 tablet by mouth 2 (Two) Times a Day As Needed for Muscle Spasms.  Dispense: 60 tablet; Refill: 0  -     nabumetone (RELAFEN) 500 MG tablet; Take 1 tablet by mouth 2 (Two) Times a Day As Needed for Mild Pain.  Dispense: 60 " tablet; Refill: 0    2. Essential hypertension  -     atenolol (TENORMIN) 50 MG tablet; Take 1 tablet by mouth 2 (Two) Times a Day.  Dispense: 180 tablet; Refill: 1      Problem List Items Addressed This Visit    None  Visit Diagnoses       Annular tear of cervical disc    -  Primary    Relevant Medications    HYDROcodone-acetaminophen (NORCO) 5-325 MG per tablet    Other Relevant Orders    Ambulatory Referral to Neurosurgery    Essential hypertension        Relevant Medications    atenolol (TENORMIN) 50 MG tablet            1. Paranesthesia  - We discussed the patient's MRI results in detail.  - We discussed treatment options including observation, physical therapy, and referral to neurosurgery.  - The patient would like to proceed with conservative treatment at this time.  - I will prescribe Norco to be taken as needed for pain.  - I will also prescribe Flexeril to be taken at night.  - I advised the patient to use a wedge pillow.  - I advised the patient to alternate Relafen and Norco together.  - He is let me know immediately if he suddenly loses the ability to urinate, incontinence, or bowel movements.  - Refer the patient to neurosurgery.    2. Hypertension.  - The patient's blood pressure is well controlled at this time.  - He will continue his current medication regimen.    3. Allergic rhinitis.  - The patient will continue his current medication regimen.    4. Paranesthesia in right thigh.  - The patient will continue to monitor his symptoms.  - He will let me know immediately if he loses the ability to urinate, incontinence, or bowels.          I spent 40 minutes caring for Francois on this date of service. This time includes time spent by me in the following activities:obtaining and/or reviewing a separately obtained history, performing a medically appropriate examination and/or evaluation , and counseling and educating the patient/family/caregiver  Follow Up   Return in about 4 weeks (around  10/23/2023).  Findings and plans discussed with patient who verbalizes understanding and agreement. Will followup with patient once results are in. Patient was given instructions and counseling regarding his condition or for health maintenance advice. Please see specific information pulled into the AVS if appropriate.       Holly Rodriguez MD       I have reviewed and confirmed the accuracy of the ROS as documented by the MA/LPN/RN Kelsey Watts

## 2023-09-25 NOTE — TELEPHONE ENCOUNTER
Can he come in today and discuss options?    Spoke with patient and he is scheduled to come in at 1:00 today to discuss

## 2023-09-25 NOTE — TELEPHONE ENCOUNTER
"Caller: Francois Griffin \"Marcial\"    Relationship: Self    Best call back number: [phone]    Caller requesting test results: SELF    What test was performed: MRI    When was the test performed: 09/22/2023    Additional notes: PATIENT SAW RESULTS IN MY CHART AND WOULD LIKE TO KNOW WHAT THE NEXT STEP IS. PLEASE ADVISE        "

## 2023-10-12 ENCOUNTER — OFFICE VISIT (OUTPATIENT)
Dept: NEUROSURGERY | Facility: CLINIC | Age: 48
End: 2023-10-12
Payer: COMMERCIAL

## 2023-10-12 VITALS — WEIGHT: 216 LBS | RESPIRATION RATE: 17 BRPM | HEIGHT: 69 IN | BODY MASS INDEX: 31.99 KG/M2

## 2023-10-12 DIAGNOSIS — M50.10 HERNIATION OF CERVICAL INTERVERTEBRAL DISC WITH RADICULOPATHY: Primary | ICD-10-CM

## 2023-10-12 PROCEDURE — 99204 OFFICE O/P NEW MOD 45 MIN: CPT | Performed by: NEUROLOGICAL SURGERY

## 2023-10-12 NOTE — PROGRESS NOTES
NAME: TONY SPARKS   DOS: 10/12/2023  : 1975  PCP: Holly Rodriguez MD    Chief Complaint:    Chief Complaint   Patient presents with    Neck & left arm pain    Right shoulder pain       History of Present Illness:  48 y.o. male     I saw this very pleasant nurse in neurosurgical consultation he is from the [place] area.  He expresses a classic history of a left-sided ruptured disc at C6 involving numbness and pain in his hand and left scapular area.  He denies weakness in his lower extremities he is experienced a marked improvement on steroids and is about to undergo some therapy.  He is here for evaluation he is accompanied by his mother  PMHX  Allergies:  No Known Allergies  Medications    Current Outpatient Medications:     atenolol (TENORMIN) 50 MG tablet, Take 1 tablet by mouth 2 (Two) Times a Day., Disp: 180 tablet, Rfl: 1    cyclobenzaprine (FLEXERIL) 10 MG tablet, Take 1 tablet by mouth 2 (Two) Times a Day As Needed for Muscle Spasms., Disp: 60 tablet, Rfl: 0    furosemide (LASIX) 20 MG tablet, Take 1 tablet by mouth Daily As Needed (edema). (Patient not taking: Reported on 2023), Disp: 90 tablet, Rfl: 1    HYDROcodone-acetaminophen (NORCO) 5-325 MG per tablet, Take 1 tablet by mouth Every 8 (Eight) Hours As Needed for Moderate Pain., Disp: 60 tablet, Rfl: 0    nabumetone (RELAFEN) 500 MG tablet, Take 1 tablet by mouth 2 (Two) Times a Day As Needed for Mild Pain., Disp: 60 tablet, Rfl: 0    tadalafil (Cialis) 20 MG tablet, Take 1 tablet by mouth as needed for erectile dysfunction. (maximum of 20mg per dose), Disp: 20 tablet, Rfl: 1    tamsulosin (FLOMAX) 0.4 MG capsule 24 hr capsule, Take 1 capsule by mouth Daily., Disp: 15 capsule, Rfl: 0  No current facility-administered medications for this visit.    Facility-Administered Medications Ordered in Other Visits:     gentamicin (GARAMYCIN) IVPB 80 mg, 80 mg, Intravenous, Once, Scar Luna MD  Past Medical History:  Past Medical History:    Diagnosis Date    Elevated cholesterol     Hyperlipidemia     Hypertension     Kidney stone     PONV (postoperative nausea and vomiting)      Past Surgical History:  Past Surgical History:   Procedure Laterality Date    APPENDECTOMY      CHOLECYSTECTOMY      COLONOSCOPY N/A 3/16/2021    Procedure: COLONOSCOPY WITH BIOPSY;  Surgeon: Mary Cosme MD;  Location: [facility];  Service: Gastroenterology;  Laterality: N/A;    CYSTOSCOPY URETEROSCOPY LASER LITHOTRIPSY Left 6/27/2022    Procedure: CYSTOSCOPY URETEROSCOPY LASER LITHOTRIPSY WITH STENT INSERTION;  Surgeon: Elvin Whitley MD;  Location: [facility];  Service: Urology;  Laterality: Left;    CYSTOSCOPY, RETROGRADE PYELOGRAM AND STENT INSERTION Left 6/20/2022    Procedure: CYSTOSCOPY RETROGRADE PYELOGRAM AND STENT INSERTION;  Surgeon: Scar Luna MD;  Location: [facility];  Service: Urology;  Laterality: Left;    EXTRACORPOREAL SHOCK WAVE LITHOTRIPSY (ESWL) Right 10/12/2018    Procedure: EXTRACORPOREAL SHOCKWAVE LITHOTRIPSY;  Surgeon: Elvin Whitley MD;  Location: [facility];  Service: Urology    VOCAL CORD BIOPSY       Social Hx:  Social History     Tobacco Use    Smoking status: Never    Smokeless tobacco: Never   Substance Use Topics    Alcohol use: No    Drug use: No     Family Hx:  Family History   Problem Relation Age of Onset    Heart disease Mother     Hypertension Mother     Diabetes Mother     Hypertension Father     Arthritis Father     Skin cancer Father     Cancer Maternal Grandmother         colon    Cancer Maternal Grandfather         Lung    Stroke Maternal Grandfather     Breast cancer Paternal Grandmother         Gastric cancer    Cancer Paternal Grandfather         Mets to bone, unknown primary    Lymphoma Paternal Uncle      Review of Systems:        Review of Systems   Constitutional:  Negative for activity change, appetite change, chills, diaphoresis, fatigue, fever and unexpected weight change.   HENT:   Negative for congestion, dental problem, drooling, ear discharge, ear pain, facial swelling, hearing loss, mouth sores, nosebleeds, postnasal drip, rhinorrhea, sinus pressure, sneezing, sore throat, tinnitus, trouble swallowing and voice change.    Eyes:  Negative for photophobia, pain, discharge, redness, itching and visual disturbance.   Respiratory:  Negative for apnea, cough, choking, chest tightness, shortness of breath, wheezing and stridor.    Cardiovascular:  Negative for chest pain, palpitations and leg swelling.   Gastrointestinal:  Negative for abdominal distention, abdominal pain, anal bleeding, blood in stool, constipation, diarrhea, nausea, rectal pain and vomiting.   Endocrine: Negative for cold intolerance, heat intolerance, polydipsia, polyphagia and polyuria.   Genitourinary:  Negative for decreased urine volume, difficulty urinating, dysuria, enuresis, flank pain, frequency, genital sores, hematuria and urgency.   Musculoskeletal:  Positive for arthralgias, myalgias and neck pain. Negative for back pain, gait problem, joint swelling and neck stiffness.   Skin:  Negative for color change, pallor, rash and wound.   Allergic/Immunologic: Negative for environmental allergies, food allergies and immunocompromised state.   Neurological:  Negative for dizziness, tremors, seizures, syncope, facial asymmetry, speech difficulty, weakness, light-headedness, numbness and headaches.   Hematological:  Negative for adenopathy. Does not bruise/bleed easily.   Psychiatric/Behavioral:  Negative for agitation, behavioral problems, confusion, decreased concentration, dysphoric mood, hallucinations, self-injury, sleep disturbance and suicidal ideas. The patient is not nervous/anxious and is not hyperactive.    All other systems reviewed and are negative.     I have reviewed this note template and all pertinent parts of the review of systems social, family history, surgical history and medication list      Physical  Examination:  Vitals:    10/12/23 1128   Resp: 17      General Appearance:   Well developed, well nourished, well groomed, alert, and cooperative.  Neurological examination:  Neurologic Exam  Vital signs were reviewed and documented in the chart  Patient appeared in good neurologic function with normal comprehension fluent speech  Mood and affect are normal  Sense of smell deferred    Pupils symmetric equally reactive funduscopic exam not visualized   Visual fields intact to confrontation  Extraocular movements intact  Face motor function is symmetric  Facial sensations normal  Hearing intact to finger rub hearing intact to finger rub  Tongue is midline  Palate symmetric  Swallowing normal  Shoulder shrug normal    Muscle bulk and tone normal  5 out of 5 strength no motor drift I detect no flagrant weakness  Gait normal intact    No clonus long tract signs or myelopathy    Reflexes symmetric  No edema noted and extremities skin appears normal            Review of Imaging/DATA:  I personally reviewed an MRI and interpreted the findings he is got a nice sizable C5-6 disc rupture with annular tear with some lateral intraforaminal components that likely are explanatory of most of his symptoms the remainder of the spine appears okay there is no evidence of high-grade central cord compression  Diagnoses/Plan:    Mr. Griffin is a 48 y.o. male   1.  Cervical DJD    2.  Left-sided C5-6 disc herniation with C6 radiculopathy-currently resolving still pesky symptoms some intermittent numbness    I explained the risk benefits and expected outcome of major elective surgery for their problem, complications from approach, and infection, the risk of neurologic implications after surgery as well as need for repeat surgeries and most importantly failure to achieve quality of life improvement from the surgery to the patient.    I explained the long-term care for cervical degenerative changes he would be a candidate for anterior cervical  discectomy and fusion I explained what that surgery would look like as well as recovery and/or arthroplasty.    In the meanwhile given his relative reduction in pain absence of weakness I think we can continue conservatism but I did explain I be happy to see him back for surgical consideration should the symptoms persist    Plan will be   To give him a contact point regarding interventional pain therapy.  I think is reasonable to consider injection should he have persistent neck pain.  He is on call me if he wishes to pursue surgical intervention but I suspect he will do fine conservatism

## 2023-10-24 ENCOUNTER — OFFICE VISIT (OUTPATIENT)
Dept: FAMILY MEDICINE CLINIC | Facility: CLINIC | Age: 48
End: 2023-10-24
Payer: COMMERCIAL

## 2023-10-24 VITALS
WEIGHT: 221.8 LBS | SYSTOLIC BLOOD PRESSURE: 128 MMHG | TEMPERATURE: 97.7 F | HEIGHT: 69 IN | BODY MASS INDEX: 32.85 KG/M2 | HEART RATE: 73 BPM | DIASTOLIC BLOOD PRESSURE: 82 MMHG | OXYGEN SATURATION: 99 %

## 2023-10-24 DIAGNOSIS — Z13.6 ENCOUNTER FOR LIPID SCREENING FOR CARDIOVASCULAR DISEASE: ICD-10-CM

## 2023-10-24 DIAGNOSIS — Z13.220 ENCOUNTER FOR LIPID SCREENING FOR CARDIOVASCULAR DISEASE: ICD-10-CM

## 2023-10-24 DIAGNOSIS — M50.30 ANNULAR TEAR OF CERVICAL DISC: Primary | ICD-10-CM

## 2023-10-24 PROCEDURE — 99214 OFFICE O/P EST MOD 30 MIN: CPT | Performed by: FAMILY MEDICINE

## 2023-10-24 RX ORDER — METHYLPREDNISOLONE 4 MG/1
TABLET ORAL
Qty: 21 TABLET | Refills: 0 | Status: SHIPPED | OUTPATIENT
Start: 2023-10-24

## 2023-10-24 RX ORDER — METAXALONE 800 MG/1
800 TABLET ORAL 2 TIMES DAILY PRN
Qty: 60 TABLET | Refills: 2 | Status: SHIPPED | OUTPATIENT
Start: 2023-10-24

## 2023-10-24 NOTE — PROGRESS NOTES
"Francosi Griffin     VITALS: Blood pressure 128/82, pulse 73, temperature 97.7 °F (36.5 °C), temperature source Temporal, height 175.3 cm (69\"), weight 101 kg (221 lb 12.8 oz), SpO2 99%.    Subjective  Chief Complaint  Annual tear of cervical disc    Subjective          History of Present Illness:  Patient is a 48 y.o.  male with medical conditions significant for hypertension and allergic rhinitis who presents to clinic secondary to medical follow-up. He had put out his back several weeks ago and has seen neurosurgery since.    The patient is feeling about 90 percent better. The steroids helped with inflammation. He was told by neurosurgery that his injury warrants surgery if he wanted to do it. However, considering the patient's age and his profession, a wait and see approach is acceptable.  He was told that it will take about 2 years to heal. He was told that most of his relief will be in the next 90 days.  His employer is taking good care of him and not requiring him to do patient transfers.      After the steroid injection, he had about 2 or 3 more days of discomfort.   He is happy with current medications. He would like to get another Medrol pack just in case something happens.  Relafen is working great. He would like to try the Skelaxin.     No complaints about any of the medications.    The following portions of the patient's history were reviewed and updated as appropriate: allergies, current medications, past family history, past medical history, past social history, past surgical history and problem list.    Past Medical History  Past Medical History:   Diagnosis Date    Elevated cholesterol     Hyperlipidemia     Hypertension     Kidney stone     PONV (postoperative nausea and vomiting)        Surgical History  Past Surgical History:   Procedure Laterality Date    APPENDECTOMY      CHOLECYSTECTOMY      COLONOSCOPY N/A 3/16/2021    Procedure: COLONOSCOPY WITH BIOPSY;  Surgeon: Mary Cosme MD;  " "Location: [facility];  Service: Gastroenterology;  Laterality: N/A;    CYSTOSCOPY URETEROSCOPY LASER LITHOTRIPSY Left 6/27/2022    Procedure: CYSTOSCOPY URETEROSCOPY LASER LITHOTRIPSY WITH STENT INSERTION;  Surgeon: Elvin Whitley MD;  Location: [facility];  Service: Urology;  Laterality: Left;    CYSTOSCOPY, RETROGRADE PYELOGRAM AND STENT INSERTION Left 6/20/2022    Procedure: CYSTOSCOPY RETROGRADE PYELOGRAM AND STENT INSERTION;  Surgeon: Scar Luna MD;  Location: [facility];  Service: Urology;  Laterality: Left;    EXTRACORPOREAL SHOCK WAVE LITHOTRIPSY (ESWL) Right 10/12/2018    Procedure: EXTRACORPOREAL SHOCKWAVE LITHOTRIPSY;  Surgeon: Elvin Whitley MD;  Location: [facility];  Service: Urology    VOCAL CORD BIOPSY         Family History  Family History   Problem Relation Age of Onset    Heart disease Mother     Hypertension Mother     Diabetes Mother     Hypertension Father     Arthritis Father     Skin cancer Father     Cancer Maternal Grandmother         colon    Cancer Maternal Grandfather         Lung    Stroke Maternal Grandfather     Breast cancer Paternal Grandmother         Gastric cancer    Cancer Paternal Grandfather         Mets to bone, unknown primary    Lymphoma Paternal Uncle        Social History  Social History     Socioeconomic History    Marital status:    Tobacco Use    Smoking status: Never    Smokeless tobacco: Never   Substance and Sexual Activity    Alcohol use: No    Drug use: No    Sexual activity: Defer       Objective   Vital Signs:   /82 (BP Location: Right arm, Patient Position: Sitting, Cuff Size: Adult)   Pulse 73   Temp 97.7 °F (36.5 °C) (Temporal)   Ht 175.3 cm (69\")   Wt 101 kg (221 lb 12.8 oz)   SpO2 99%   BMI 32.75 kg/m²     Physical Exam     Gen: Patient in NAD. Pleasant and answers appropriately. A&Ox3.    Skin: Warm and dry with normal turgor. No purpura, rashes, or unusual pigmentation noted. Hair is normal in appearance and " distribution.    HEENT: NC/AT. No lesions noted. Conjunctiva clear, sclera nonicteric. PERRL. EOMI without nystagmus or strabismus. Fundi appear benign. No hemorrhages or exudates of eyes. Auditory canals are patent bilaterally without lesions. TMs intact,  nonerythematous, nonbulging without lesions. Nasal mucosa pink, nonerythematous, and nonedematous. Frontal and maxillary sinuses are nontender. O/P nonerythematous and moist without exudate.    Neck: Supple without lymph nodes palpated. FROM.     Lungs: CTA B/L without rales, rhonchi, crackles, or wheezes.    Heart: RRR. S1 and S2 normal. No S3 or S4. No MRGT.    Abd: Soft, nontender,nondistended. (+)BSx4 quadrants.     Extrem: No CCE. Radial pulses 2+/4 and equal B/L. FROMx4.     Back: Decreased range of motion.    Neuro: No focal motor/sensory deficits.    Procedures    Result Review :   The following data was reviewed by: Holly Rodriguez MD  on 10/24/2023:                Assessment and Plan    Francois Griffin is a 48 y.o. here for medical followup.    Diagnoses and all orders for this visit:    1. Annular tear of cervical disc (Primary)  Comments:  Prescribed Medrol Dosepak.  Prescribed Skelaxin.  Orders:  -     metaxalone (Skelaxin) 800 MG tablet; Take 1 tablet by mouth 2 (Two) Times a Day As Needed for Muscle Spasms.  Dispense: 60 tablet; Refill: 2  -     methylPREDNISolone (MEDROL) 4 MG dose pack; Take by mouth as directed on package instructions.  Dispense: 21 tablet; Refill: 0  -     Comprehensive Metabolic Panel; Future    2. Encounter for lipid screening for cardiovascular disease  Comments:  Ordered annual laboratory studies  Orders:  -     Lipid Panel; Future          Problem List Items Addressed This Visit    None  Visit Diagnoses       Annular tear of cervical disc    -  Primary    Relevant Medications    metaxalone (Skelaxin) 800 MG tablet    methylPREDNISolone (MEDROL) 4 MG dose pack    Other Relevant Orders    Comprehensive Metabolic Panel     Encounter for lipid screening for cardiovascular disease        Relevant Orders    Lipid Panel          Follow up in 6 months.                I spent 31 minutes caring for Francois on this date of service. This time includes time spent by me in the following activities:performing a medically appropriate examination and/or evaluation  and counseling and educating the patient/family/caregiver  Follow Up   Return in about 6 months (around 4/24/2024).  Findings and plans discussed with patient who verbalizes understanding and agreement. Will followup with patient once results are in. Patient was given instructions and counseling regarding his condition or for health maintenance advice. Please see specific information pulled into the AVS if appropriate.       Transcribed from ambient dictation for Holly Rodriguez MD by Kelsey Watts.  10/24/23   16:57 EDT    Patient or patient representative verbalized consent to the visit recording.  I have personally performed the services described in this document as transcribed by the above individual, and it is both accurate and complete.

## 2023-11-14 ENCOUNTER — TELEPHONE (OUTPATIENT)
Dept: FAMILY MEDICINE CLINIC | Facility: CLINIC | Age: 48
End: 2023-11-14
Payer: COMMERCIAL

## 2023-11-14 NOTE — TELEPHONE ENCOUNTER
"  Caller: Francois Griffin \"Marcial\"    Relationship: Self    Best call back number: [phone]     Who are you requesting to speak with (clinical staff, provider,  specific staff member): CLINICAL      What was the call regarding: HAS BEEN HAVING INTOLERANCE TO EGGS, SHOULD HE GET THE FLU VACCINE    Is it okay if the provider responds through MyChart: YES      "

## 2023-11-14 NOTE — TELEPHONE ENCOUNTER
"  Caller: Francois Griffin \"Marcial\"    Relationship: Self    Best call back number: [phone]     Who are you requesting to speak with (clinical staff, provider,  specific staff member): CLINICAL      What was the call regarding: HAS BEEN HAVING INTOLERANCE TO EGGS, SHOULD HE GET THE FLU VACCINE    Is it okay if the provider responds through MyChart: YES      Spoke with patient he reports every time he eats anything containing eggs he has Diarrhea,has taken the flu shot in the past with no problems.    "

## 2023-11-27 ENCOUNTER — TELEPHONE (OUTPATIENT)
Dept: FAMILY MEDICINE CLINIC | Facility: CLINIC | Age: 48
End: 2023-11-27
Payer: COMMERCIAL

## 2023-11-28 ENCOUNTER — LAB (OUTPATIENT)
Dept: LAB | Facility: HOSPITAL | Age: 48
End: 2023-11-28
Payer: COMMERCIAL

## 2023-11-28 DIAGNOSIS — Z13.220 ENCOUNTER FOR LIPID SCREENING FOR CARDIOVASCULAR DISEASE: ICD-10-CM

## 2023-11-28 DIAGNOSIS — M50.30 ANNULAR TEAR OF CERVICAL DISC: ICD-10-CM

## 2023-11-28 DIAGNOSIS — Z13.6 ENCOUNTER FOR LIPID SCREENING FOR CARDIOVASCULAR DISEASE: ICD-10-CM

## 2023-11-28 LAB
ALBUMIN SERPL-MCNC: 4.2 G/DL (ref 3.5–5.2)
ALBUMIN/GLOB SERPL: 1.6 G/DL
ALP SERPL-CCNC: 77 U/L (ref 39–117)
ALT SERPL W P-5'-P-CCNC: 20 U/L (ref 1–41)
ANION GAP SERPL CALCULATED.3IONS-SCNC: 8.2 MMOL/L (ref 5–15)
AST SERPL-CCNC: 17 U/L (ref 1–40)
BILIRUB SERPL-MCNC: 1.1 MG/DL (ref 0–1.2)
BUN SERPL-MCNC: 17 MG/DL (ref 6–20)
BUN/CREAT SERPL: 16.5 (ref 7–25)
CALCIUM SPEC-SCNC: 9.2 MG/DL (ref 8.6–10.5)
CHLORIDE SERPL-SCNC: 105 MMOL/L (ref 98–107)
CHOLEST SERPL-MCNC: 219 MG/DL (ref 0–200)
CO2 SERPL-SCNC: 26.8 MMOL/L (ref 22–29)
CREAT SERPL-MCNC: 1.03 MG/DL (ref 0.76–1.27)
EGFRCR SERPLBLD CKD-EPI 2021: 90.7 ML/MIN/1.73
GLOBULIN UR ELPH-MCNC: 2.6 GM/DL
GLUCOSE SERPL-MCNC: 106 MG/DL (ref 65–99)
HDLC SERPL-MCNC: 37 MG/DL (ref 40–60)
LDLC SERPL CALC-MCNC: 134 MG/DL (ref 0–100)
LDLC/HDLC SERPL: 3.49 {RATIO}
POTASSIUM SERPL-SCNC: 4.4 MMOL/L (ref 3.5–5.2)
PROT SERPL-MCNC: 6.8 G/DL (ref 6–8.5)
SODIUM SERPL-SCNC: 140 MMOL/L (ref 136–145)
TRIGL SERPL-MCNC: 265 MG/DL (ref 0–150)
VLDLC SERPL-MCNC: 48 MG/DL (ref 5–40)

## 2023-11-28 PROCEDURE — 36415 COLL VENOUS BLD VENIPUNCTURE: CPT

## 2023-11-28 PROCEDURE — 80061 LIPID PANEL: CPT

## 2023-11-28 PROCEDURE — 80053 COMPREHEN METABOLIC PANEL: CPT

## 2024-02-08 RX ORDER — OSELTAMIVIR PHOSPHATE 75 MG/1
75 CAPSULE ORAL DAILY
Qty: 7 CAPSULE | Refills: 0 | Status: SHIPPED | OUTPATIENT
Start: 2024-02-08 | End: 2024-02-15

## 2024-03-15 ENCOUNTER — HOSPITAL ENCOUNTER (OUTPATIENT)
Dept: GENERAL RADIOLOGY | Facility: HOSPITAL | Age: 49
Discharge: HOME | End: 2024-03-15
Admitting: UROLOGY
Payer: COMMERCIAL

## 2024-03-15 DIAGNOSIS — N20.1 RIGHT URETERAL STONE: Primary | ICD-10-CM

## 2024-03-15 DIAGNOSIS — N20.1 URETERAL CALCULUS: Primary | ICD-10-CM

## 2024-03-15 DIAGNOSIS — N20.1 RIGHT URETERAL STONE: ICD-10-CM

## 2024-03-15 PROCEDURE — 74018 RADEX ABDOMEN 1 VIEW: CPT | Performed by: RADIOLOGY

## 2024-03-15 PROCEDURE — 74018 RADEX ABDOMEN 1 VIEW: CPT

## 2024-03-15 RX ORDER — KETOROLAC TROMETHAMINE 10 MG/1
10 TABLET, FILM COATED ORAL EVERY 6 HOURS PRN
Qty: 16 TABLET | Refills: 2 | Status: SHIPPED | OUTPATIENT
Start: 2024-03-15

## 2024-03-15 RX ORDER — ONDANSETRON 4 MG/1
4 TABLET, FILM COATED ORAL DAILY PRN
Qty: 30 TABLET | Refills: 1 | Status: SHIPPED | OUTPATIENT
Start: 2024-03-15

## 2024-04-15 DIAGNOSIS — I10 ESSENTIAL HYPERTENSION: ICD-10-CM

## 2024-04-16 RX ORDER — ATENOLOL 50 MG/1
50 TABLET ORAL 2 TIMES DAILY
Qty: 180 TABLET | Refills: 1 | Status: SHIPPED | OUTPATIENT
Start: 2024-04-16

## 2024-06-05 RX ORDER — AMOXICILLIN AND CLAVULANATE POTASSIUM 875; 125 MG/1; MG/1
1 TABLET, FILM COATED ORAL 2 TIMES DAILY
Qty: 14 TABLET | Refills: 0 | Status: SHIPPED | OUTPATIENT
Start: 2024-06-05

## 2024-08-20 DIAGNOSIS — N40.0 BENIGN PROSTATIC HYPERPLASIA WITHOUT LOWER URINARY TRACT SYMPTOMS: ICD-10-CM

## 2024-08-20 RX ORDER — TADALAFIL 20 MG/1
20 TABLET ORAL AS NEEDED
Qty: 20 TABLET | Refills: 1 | Status: SHIPPED | OUTPATIENT
Start: 2024-08-20

## 2024-09-10 NOTE — OP NOTE
09/10/24 0500   Sleep Analysis   Sleep Report Good   Sleep/Wake Cycle Early morning awakening   Hours Slept 8        CYSTOSCOPY RETROGRADE PYELOGRAM AND STENT INSERTION  Procedure Report    Patient Name:  Francois Griffin  YOB: 1975    Date of Surgery:  6/20/2022     Indications:  Left UPJ sotne    Pre-op Diagnosis:   Left ureteral stone [N20.1]       Post-Op Diagnosis Codes:     * Left ureteral stone [N20.1]    Procedure/CPT® Codes:      Procedure(s):  CYSTOSCOPY RETROGRADE PYELOGRAM AND STENT INSERTION    Staff:  Surgeon(s):  Scar Luna MD         Anesthesia: * No anesthesia type entered *    Estimated Blood Loss: minimal    Implants:    Implant Name Type Inv. Item Serial No.  Lot No. LRB No. Used Action   STNT URETRL POLARIS ULTR NO GW [id number] - [id number] Stent STNT URETRL POLARIS ULTR NO GW [id number]  [employer] [id number] Left 1 Implanted       Specimen:          Specimens     ID Source Type Tests Collected By Collected At Frozen?    1 Kidney, Left Urine · URINE CULTURE   Scar Luna MD 6/20/22 1236     Description: Left Renal Pelvis Urine    2 Urinary Bladder Urine · URINE CULTURE   Scar Luna MD 6/20/22 1237               Findings:   1.  Cystoscopy showed no masses or lesions within the walls of the bladder  2.  Attempted ureteroscopy with narrowing at the pelvic brim.  We were unable to pass the ureteroscope beyond this area.  3.  Attempted ureteral dilation and then flexible ureteroscopy, however, we were still unable to pass the scope.  4.  Retrograde pyelogram confirmed placement in the left renal pelvis  5.  Successful placement of 5 Albanian by 24 cm double-J left ureteral stent    Complications: None immediate    Description of Procedure:   The patient was identified in the preoperative holding area where informed consent was reviewed and signed. He was transported to the operating room per anesthesia and placed supine on the operating table. Smooth endotracheal intubation was performed without issue after administration of general anesthesia. The patient was then placed in the  dorsal lithotomy position where genitals were prepped and draped in the usual sterile fashion. A brief timeout was performed identifying the correct patient procedure and laterality. Perioperative antibiotics were administered. All pressure points were padded.     Procedure began by inserting a 21 Maori cystoscope atraumatically per the patient's urethra, entering into the bladder where pancystoscopy was performed identifying bilateral orthotopic ureteral orifices and no evidence of bladder masses or other lesions. Attention was then turned to the left ureteral orifice. A 5 Maori open-ended ureteral catheter was inserted into the distal ureter and contrast dye was injected up the ureter.     A retrograde pyelogram was performed and the ureter and renal pelvis locations were identified under fluoroscopy. No filling defects were noted.  Minimal hydronephrosis.  Stone in the expected location.     An attempt was made to perform ureteroscopy and perform definitive stone therapy, however, he had a narrowing at the pelvic brim and we were unable to pass this area.  An attempt was made to dilate it and pass a flexible ureteroscope but we were still unable to pass the scope beyond this point.  At this point the decision was made to place a stent and have the patient follow-up for definitive stone management once the ureter has been dilated.    A Sensor wire was inserted through the working channel of the scope and advanced up the left ureteral orifice to the level of the renal pelvis on fluoroscopy. A 5 Maori x 24 cm double J ureteral stent was then advanced over the wire to the renal pelvis confirmed on fluoroscopy. The Sensor wire was then removed and a good distal curl was visualized on fluoroscopy, and a proximal curl was observed within the bladder on direct visualization. The patient's bladder was emptied. The cystoscope was then removed.  Lidocaine jelly was inserted into the patient's urethra for analgesia. The  patient was awoken from general anesthesia and transported to the PACU in stable condition.    Disposition: The patient will be discharged home once PACU criteria is met.  I will then have him scheduled in the operating room for definitive stone management.  He was given prescription for Toradol, tamsulosin, Pyridium, and Keflex.            Scar Luna MD     Date: 6/20/2022  Time: 12:53 EDT

## 2024-11-15 DIAGNOSIS — N40.0 BENIGN PROSTATIC HYPERPLASIA WITHOUT LOWER URINARY TRACT SYMPTOMS: ICD-10-CM

## 2024-11-15 DIAGNOSIS — I10 ESSENTIAL HYPERTENSION: ICD-10-CM

## 2024-11-15 RX ORDER — TADALAFIL 20 MG/1
20 TABLET ORAL AS NEEDED
Qty: 20 TABLET | Refills: 1 | Status: SHIPPED | OUTPATIENT
Start: 2024-11-15

## 2024-11-17 RX ORDER — ATENOLOL 50 MG/1
50 TABLET ORAL 2 TIMES DAILY
Qty: 180 TABLET | Refills: 1 | Status: SHIPPED | OUTPATIENT
Start: 2024-11-17

## 2025-02-06 DIAGNOSIS — N40.0 BENIGN PROSTATIC HYPERPLASIA WITHOUT LOWER URINARY TRACT SYMPTOMS: ICD-10-CM

## 2025-02-06 RX ORDER — TADALAFIL 20 MG/1
20 TABLET ORAL AS NEEDED
Qty: 20 TABLET | Refills: 1 | Status: SHIPPED | OUTPATIENT
Start: 2025-02-06

## 2025-03-07 ENCOUNTER — HOSPITAL ENCOUNTER (OUTPATIENT)
Dept: GENERAL RADIOLOGY | Facility: HOSPITAL | Age: 50
Discharge: HOME | End: 2025-03-07
Admitting: UROLOGY
Payer: COMMERCIAL

## 2025-03-07 DIAGNOSIS — N20.1 URETERAL CALCULUS: Primary | ICD-10-CM

## 2025-03-07 DIAGNOSIS — N20.1 URETERAL CALCULUS: ICD-10-CM

## 2025-03-07 PROCEDURE — 74018 RADEX ABDOMEN 1 VIEW: CPT

## 2025-03-07 RX ORDER — KETOROLAC TROMETHAMINE 10 MG/1
10 TABLET, FILM COATED ORAL EVERY 6 HOURS PRN
Qty: 16 TABLET | Refills: 2 | Status: SHIPPED | OUTPATIENT
Start: 2025-03-07

## 2025-03-07 RX ORDER — ONDANSETRON 4 MG/1
4 TABLET, FILM COATED ORAL DAILY PRN
Qty: 30 TABLET | Refills: 1 | Status: SHIPPED | OUTPATIENT
Start: 2025-03-07

## 2025-03-07 RX ORDER — HYDROCODONE BITARTRATE AND ACETAMINOPHEN 10; 325 MG/1; MG/1
1 TABLET ORAL EVERY 6 HOURS PRN
Qty: 20 TABLET | Refills: 0 | Status: SHIPPED | OUTPATIENT
Start: 2025-03-07

## 2025-03-19 DIAGNOSIS — N20.0 BILATERAL KIDNEY STONES: Primary | ICD-10-CM

## 2025-03-31 NOTE — DISCHARGE INSTRUCTIONS
4/4/25  ARRIVAL TIME PER DR  OFFICE    TAKE the following medications the morning of surgery:  All heart or blood pressure medications    HOLD all diabetic medications the morning of surgery as ordered by physician.    Please discontinue all blood thinners and anticoagulants (except aspirin) prior to surgery as per your surgeon and cardiologist instructions.  Aspirin may be continued up to the day prior to surgery.     CHLORHEXIDINE CLOTHS GIVEN WITH INSTRUCTIONS AND FORM TO RETURN TO HOSPITAL, IF APPLICABLE.    General Instructions:  Do not eat or drink after midnight: includes water, mints, or gum. You may brush your teeth.  Dental appliances that are removable must be taken out day of surgery.  Do not smoke, chew tobacco, or drink alcohol.  Bring medications in original bottles, any inhalers and if applicable your C-PAP/BI-PAP machine.  Bring any papers given to you in the doctor's office.  Wear clean comfortable clothes and socks.  Do not wear contact lenses or make-up. Bring a case for your glasses if applicable.  Bring crutches or walker if applicable.  Leave all other valuables and jewelry at home.    If you were given a blood bank ID arm band remember to bring it with you the day of surgery.    Preventing a Surgical Site Infection:  Shower the night before surgery (unless instructed other wise) using a fresh bar of anti-bacterial soap (such as Dial) and clean washcloth. Dry with a clean towel and dress in clean clothing.  For 2 to 3 days before surgery, avoid shaving with a razor near where you will have surgery because the razor can irritate skin and make it easier to develop an infection. Ask your surgeon if you will be receiving antibiotics prior to surgery.  Make sure you, your family, and all healthcare providers clear their hands with soap and water or an alcohol-based hand  before caring for you or your wound.  If at all possible, quit smoking as many days before surgery as you can.    Day of  surgery:  Upon arrival, a Pre-op nurse and Anesthesiologist will review your health history, obtain vital signs, and answer questions you may have. The only belongings needed at this time will be your home medications and if applicable your C-PAP/BI-PAP machine. If you are staying overnight your family can leave the rest of your belongings in the car and bring them to your room later. A Pre-op nurse will start an IV and you may receive medication in preparation for surgery, including something to help you relax. Your family will be able to see you in the Pre-op area. While you are in surgery your family should notify the waiting room  if they leave the waiting room area and provide a contact phone number.    Please be aware that surgery does come with discomfort. We want to make every effort to control your discomfort so please discuss any uncontrolled symptoms with your nurse. Your doctor will most likely have prescribed pain medications.    If you are going home after surgery you will receive individualized written care instructions before being discharged. A responsible adult must drive you to and from the hospital on the day of surgery and stay with you for 24 hours.    If you are staying overnight following surgery, you will be transported to your hospital room following the recovery period.     If you have any questions please call Pre-Admission Testing at [phone] Ext [phone] Monday-Friday 8:00-3:00  Deductibles and co-payments are collected on the day of service. Please be prepared to pay the required co-pay, deductible or deposit on the day of service as defined by your plan.    A RESPONSIBLE PERSON MUST REMAIN IN THE WAITING ROOM DURING YOUR PROCEDURE AND A RESPONSIBLE  MUST BE AVAILABLE UPON YOUR DISCHARGE.

## 2025-04-02 ENCOUNTER — PRE-ADMISSION TESTING (OUTPATIENT)
Dept: PREADMISSION TESTING | Facility: HOSPITAL | Age: 50
End: 2025-04-02
Payer: COMMERCIAL

## 2025-04-02 LAB
ANION GAP SERPL CALCULATED.3IONS-SCNC: 11.8 MMOL/L (ref 5–15)
BUN SERPL-MCNC: 15 MG/DL (ref 6–20)
BUN/CREAT SERPL: 15.2 (ref 7–25)
CALCIUM SPEC-SCNC: 9.2 MG/DL (ref 8.6–10.5)
CHLORIDE SERPL-SCNC: 100 MMOL/L (ref 98–107)
CO2 SERPL-SCNC: 27.2 MMOL/L (ref 22–29)
CREAT SERPL-MCNC: 0.99 MG/DL (ref 0.76–1.27)
DEPRECATED RDW RBC AUTO: 39.9 FL (ref 37–54)
EGFRCR SERPLBLD CKD-EPI 2021: 94.6 ML/MIN/1.73
ERYTHROCYTE [DISTWIDTH] IN BLOOD BY AUTOMATED COUNT: 12.7 % (ref 12.3–15.4)
GLUCOSE SERPL-MCNC: 88 MG/DL (ref 65–99)
HCT VFR BLD AUTO: 48.4 % (ref 37.5–51)
HGB BLD-MCNC: 16.1 G/DL (ref 13–17.7)
MCH RBC QN AUTO: 28.9 PG (ref 26.6–33)
MCHC RBC AUTO-ENTMCNC: 33.3 G/DL (ref 31.5–35.7)
MCV RBC AUTO: 86.7 FL (ref 79–97)
PLATELET # BLD AUTO: 345 10*3/MM3 (ref 140–450)
PMV BLD AUTO: 11.2 FL (ref 6–12)
POTASSIUM SERPL-SCNC: 4 MMOL/L (ref 3.5–5.2)
RBC # BLD AUTO: 5.58 10*6/MM3 (ref 4.14–5.8)
SODIUM SERPL-SCNC: 139 MMOL/L (ref 136–145)
WBC NRBC COR # BLD AUTO: 7.91 10*3/MM3 (ref 3.4–10.8)

## 2025-04-02 PROCEDURE — 85027 COMPLETE CBC AUTOMATED: CPT

## 2025-04-02 PROCEDURE — 80048 BASIC METABOLIC PNL TOTAL CA: CPT

## 2025-04-02 PROCEDURE — 36415 COLL VENOUS BLD VENIPUNCTURE: CPT

## 2025-04-03 ENCOUNTER — ANESTHESIA EVENT (OUTPATIENT)
Dept: PERIOP | Facility: HOSPITAL | Age: 50
End: 2025-04-03
Payer: COMMERCIAL

## 2025-04-04 ENCOUNTER — ANESTHESIA (OUTPATIENT)
Dept: PERIOP | Facility: HOSPITAL | Age: 50
End: 2025-04-04
Payer: COMMERCIAL

## 2025-04-04 ENCOUNTER — HOSPITAL ENCOUNTER (OUTPATIENT)
Facility: HOSPITAL | Age: 50
Setting detail: HOSPITAL OUTPATIENT SURGERY
Discharge: HOME | End: 2025-04-04
Attending: UROLOGY | Admitting: UROLOGY
Payer: COMMERCIAL

## 2025-04-04 VITALS
SYSTOLIC BLOOD PRESSURE: 124 MMHG | DIASTOLIC BLOOD PRESSURE: 74 MMHG | HEART RATE: 70 BPM | RESPIRATION RATE: 16 BRPM | BODY MASS INDEX: 32.58 KG/M2 | WEIGHT: 220 LBS | TEMPERATURE: 98 F | OXYGEN SATURATION: 97 % | HEIGHT: 69 IN

## 2025-04-04 DIAGNOSIS — N20.1 URETERAL CALCULUS: Primary | ICD-10-CM

## 2025-04-04 DIAGNOSIS — N20.0 BILATERAL KIDNEY STONES: ICD-10-CM

## 2025-04-04 PROCEDURE — 25010000002 PROPOFOL 200 MG/20ML EMULSION: Performed by: NURSE ANESTHETIST, CERTIFIED REGISTERED

## 2025-04-04 PROCEDURE — S0260 H&P FOR SURGERY: HCPCS | Performed by: UROLOGY

## 2025-04-04 PROCEDURE — 25010000002 MIDAZOLAM PER 1 MG: Performed by: NURSE ANESTHETIST, CERTIFIED REGISTERED

## 2025-04-04 PROCEDURE — 25010000002 ONDANSETRON PER 1 MG: Performed by: NURSE ANESTHETIST, CERTIFIED REGISTERED

## 2025-04-04 PROCEDURE — 25010000002 KETOROLAC TROMETHAMINE PER 15 MG: Performed by: NURSE ANESTHETIST, CERTIFIED REGISTERED

## 2025-04-04 PROCEDURE — 25010000002 MIDAZOLAM PER 1 MG: Performed by: ANESTHESIOLOGY

## 2025-04-04 PROCEDURE — 25010000002 HYDROMORPHONE 1 MG/ML SOLUTION: Performed by: ANESTHESIOLOGY

## 2025-04-04 PROCEDURE — 25010000002 GENTAMICIN PER 80 MG: Performed by: UROLOGY

## 2025-04-04 PROCEDURE — 25810000003 LACTATED RINGERS PER 1000 ML: Performed by: ANESTHESIOLOGY

## 2025-04-04 PROCEDURE — 25010000002 FAMOTIDINE (PF) 20 MG/2ML SOLUTION: Performed by: NURSE ANESTHETIST, CERTIFIED REGISTERED

## 2025-04-04 PROCEDURE — 50590 FRAGMENTING OF KIDNEY STONE: CPT | Performed by: UROLOGY

## 2025-04-04 PROCEDURE — 25010000002 FENTANYL CITRATE (PF) 50 MCG/ML SOLUTION: Performed by: NURSE ANESTHETIST, CERTIFIED REGISTERED

## 2025-04-04 RX ORDER — KETOROLAC TROMETHAMINE 30 MG/ML
INJECTION, SOLUTION INTRAMUSCULAR; INTRAVENOUS AS NEEDED
Status: DISCONTINUED | OUTPATIENT
Start: 2025-04-04 | End: 2025-04-04 | Stop reason: SURG

## 2025-04-04 RX ORDER — ONDANSETRON 2 MG/ML
4 INJECTION INTRAMUSCULAR; INTRAVENOUS AS NEEDED
Status: DISCONTINUED | OUTPATIENT
Start: 2025-04-04 | End: 2025-04-04 | Stop reason: HOSPADM

## 2025-04-04 RX ORDER — ONDANSETRON 2 MG/ML
INJECTION INTRAMUSCULAR; INTRAVENOUS AS NEEDED
Status: DISCONTINUED | OUTPATIENT
Start: 2025-04-04 | End: 2025-04-04 | Stop reason: SURG

## 2025-04-04 RX ORDER — FENTANYL CITRATE 50 UG/ML
INJECTION, SOLUTION INTRAMUSCULAR; INTRAVENOUS AS NEEDED
Status: DISCONTINUED | OUTPATIENT
Start: 2025-04-04 | End: 2025-04-04 | Stop reason: SURG

## 2025-04-04 RX ORDER — FENTANYL CITRATE 50 UG/ML
50 INJECTION, SOLUTION INTRAMUSCULAR; INTRAVENOUS
Status: COMPLETED | OUTPATIENT
Start: 2025-04-04 | End: 2025-04-04

## 2025-04-04 RX ORDER — KETOROLAC TROMETHAMINE 30 MG/ML
30 INJECTION, SOLUTION INTRAMUSCULAR; INTRAVENOUS EVERY 6 HOURS PRN
Status: DISCONTINUED | OUTPATIENT
Start: 2025-04-04 | End: 2025-04-04 | Stop reason: HOSPADM

## 2025-04-04 RX ORDER — MIDAZOLAM HYDROCHLORIDE 1 MG/ML
INJECTION, SOLUTION INTRAMUSCULAR; INTRAVENOUS AS NEEDED
Status: DISCONTINUED | OUTPATIENT
Start: 2025-04-04 | End: 2025-04-04 | Stop reason: SURG

## 2025-04-04 RX ORDER — SCOPOLAMINE 1 MG/3D
1 PATCH, EXTENDED RELEASE TRANSDERMAL ONCE
Status: DISCONTINUED | OUTPATIENT
Start: 2025-04-04 | End: 2025-04-04 | Stop reason: HOSPADM

## 2025-04-04 RX ORDER — GENTAMICIN SULFATE 80 MG/50ML
80 INJECTION, SOLUTION INTRAVENOUS ONCE
Status: DISCONTINUED | OUTPATIENT
Start: 2025-04-04 | End: 2025-04-04 | Stop reason: HOSPADM

## 2025-04-04 RX ORDER — MIDAZOLAM HYDROCHLORIDE 1 MG/ML
1 INJECTION, SOLUTION INTRAMUSCULAR; INTRAVENOUS
Status: COMPLETED | OUTPATIENT
Start: 2025-04-04 | End: 2025-04-04

## 2025-04-04 RX ORDER — SODIUM CHLORIDE 0.9 % (FLUSH) 0.9 %
10 SYRINGE (ML) INJECTION AS NEEDED
Status: DISCONTINUED | OUTPATIENT
Start: 2025-04-04 | End: 2025-04-04 | Stop reason: HOSPADM

## 2025-04-04 RX ORDER — OXYCODONE AND ACETAMINOPHEN 5; 325 MG/1; MG/1
1 TABLET ORAL ONCE AS NEEDED
Status: COMPLETED | OUTPATIENT
Start: 2025-04-04 | End: 2025-04-04

## 2025-04-04 RX ORDER — MEPERIDINE HYDROCHLORIDE 25 MG/ML
12.5 INJECTION INTRAMUSCULAR; INTRAVENOUS; SUBCUTANEOUS
Status: DISCONTINUED | OUTPATIENT
Start: 2025-04-04 | End: 2025-04-04 | Stop reason: HOSPADM

## 2025-04-04 RX ORDER — SODIUM CHLORIDE 0.9 % (FLUSH) 0.9 %
10 SYRINGE (ML) INJECTION EVERY 12 HOURS SCHEDULED
Status: DISCONTINUED | OUTPATIENT
Start: 2025-04-04 | End: 2025-04-04 | Stop reason: HOSPADM

## 2025-04-04 RX ORDER — PROPOFOL 10 MG/ML
INJECTION, EMULSION INTRAVENOUS AS NEEDED
Status: DISCONTINUED | OUTPATIENT
Start: 2025-04-04 | End: 2025-04-04 | Stop reason: SURG

## 2025-04-04 RX ORDER — SODIUM CHLORIDE 9 MG/ML
40 INJECTION, SOLUTION INTRAVENOUS AS NEEDED
Status: DISCONTINUED | OUTPATIENT
Start: 2025-04-04 | End: 2025-04-04 | Stop reason: HOSPADM

## 2025-04-04 RX ORDER — IPRATROPIUM BROMIDE AND ALBUTEROL SULFATE 2.5; .5 MG/3ML; MG/3ML
3 SOLUTION RESPIRATORY (INHALATION) ONCE AS NEEDED
Status: DISCONTINUED | OUTPATIENT
Start: 2025-04-04 | End: 2025-04-04 | Stop reason: HOSPADM

## 2025-04-04 RX ORDER — HYDROCODONE BITARTRATE AND ACETAMINOPHEN 10; 325 MG/1; MG/1
1 TABLET ORAL EVERY 6 HOURS PRN
Qty: 20 TABLET | Refills: 0 | Status: SHIPPED | OUTPATIENT
Start: 2025-04-04

## 2025-04-04 RX ORDER — SODIUM CHLORIDE, SODIUM LACTATE, POTASSIUM CHLORIDE, CALCIUM CHLORIDE 600; 310; 30; 20 MG/100ML; MG/100ML; MG/100ML; MG/100ML
125 INJECTION, SOLUTION INTRAVENOUS ONCE
Status: COMPLETED | OUTPATIENT
Start: 2025-04-04 | End: 2025-04-04

## 2025-04-04 RX ORDER — FAMOTIDINE 10 MG/ML
INJECTION, SOLUTION INTRAVENOUS AS NEEDED
Status: DISCONTINUED | OUTPATIENT
Start: 2025-04-04 | End: 2025-04-04 | Stop reason: SURG

## 2025-04-04 RX ADMIN — FAMOTIDINE 20 MG: 10 INJECTION, SOLUTION INTRAVENOUS at 12:42

## 2025-04-04 RX ADMIN — ONDANSETRON 4 MG: 2 INJECTION INTRAMUSCULAR; INTRAVENOUS at 13:58

## 2025-04-04 RX ADMIN — OXYCODONE HYDROCHLORIDE AND ACETAMINOPHEN 1 TABLET: 5; 325 TABLET ORAL at 13:58

## 2025-04-04 RX ADMIN — HYDROMORPHONE HYDROCHLORIDE 1 MG: 1 INJECTION, SOLUTION INTRAMUSCULAR; INTRAVENOUS; SUBCUTANEOUS at 14:00

## 2025-04-04 RX ADMIN — MIDAZOLAM HYDROCHLORIDE 1 MG: 1 INJECTION, SOLUTION INTRAMUSCULAR; INTRAVENOUS at 11:45

## 2025-04-04 RX ADMIN — MIDAZOLAM HYDROCHLORIDE 1 MG: 1 INJECTION, SOLUTION INTRAMUSCULAR; INTRAVENOUS at 12:10

## 2025-04-04 RX ADMIN — SODIUM CHLORIDE, POTASSIUM CHLORIDE, SODIUM LACTATE AND CALCIUM CHLORIDE: 600; 310; 30; 20 INJECTION, SOLUTION INTRAVENOUS at 12:42

## 2025-04-04 RX ADMIN — FENTANYL CITRATE 100 MCG: 50 INJECTION INTRAMUSCULAR; INTRAVENOUS at 12:42

## 2025-04-04 RX ADMIN — PROPOFOL 100 MG: 10 INJECTION, EMULSION INTRAVENOUS at 12:45

## 2025-04-04 RX ADMIN — FENTANYL CITRATE 50 MCG: 50 INJECTION, SOLUTION INTRAMUSCULAR; INTRAVENOUS at 13:58

## 2025-04-04 RX ADMIN — KETOROLAC TROMETHAMINE 30 MG: 30 INJECTION, SOLUTION INTRAMUSCULAR; INTRAVENOUS at 12:58

## 2025-04-04 RX ADMIN — HYDROMORPHONE HYDROCHLORIDE 1 MG: 1 INJECTION, SOLUTION INTRAMUSCULAR; INTRAVENOUS; SUBCUTANEOUS at 14:09

## 2025-04-04 RX ADMIN — SCOLOPAMINE TRANSDERMAL SYSTEM 1 PATCH: 1 PATCH, EXTENDED RELEASE TRANSDERMAL at 11:57

## 2025-04-04 RX ADMIN — FENTANYL CITRATE 50 MCG: 50 INJECTION, SOLUTION INTRAMUSCULAR; INTRAVENOUS at 13:31

## 2025-04-04 RX ADMIN — MIDAZOLAM HYDROCHLORIDE 2 MG: 1 INJECTION, SOLUTION INTRAMUSCULAR; INTRAVENOUS at 12:42

## 2025-04-04 RX ADMIN — GENTAMICIN SULFATE 80 MG: 80 INJECTION, SOLUTION INTRAVENOUS at 12:49

## 2025-04-04 RX ADMIN — ONDANSETRON 4 MG: 2 INJECTION INTRAMUSCULAR; INTRAVENOUS at 12:42

## 2025-04-04 NOTE — OP NOTE
EXTRACORPOREAL SHOCKWAVE LITHOTRIPSY  Procedure Note    Francois Griffin  4/4/2025    Pre-op Diagnosis:   Bilateral kidney stones [N20.0]    Post-op Diagnosis:     Post-Op Diagnosis Codes:     * Bilateral kidney stones [N20.0]  89-year-old white male with recurrent urolithiasis and 2 painful stones in the upper and lower pole calyces.  ESWL-the patient is a candidate for extracorporeal shockwave lithotripsy.  We discussed the type of stone and the complications associated with the procedure including, but not limited to, pain in the flank, hematoma, spontaneous renal hemorrhage, inadequate fragmentation of stones, the need for passage of the stones, the need for concomitant additional procedures in the range of 24%, the risk of a distal fragment in the range of 3% requiring ureteroscopic removal, and the fact that sometimes a stent is indicated based on the size and the density of the stone as determined on the CAT scan.  Additionally, we discussed percutaneous nephrostolithotomy.  Including the mini PERC.  With its attendant risks of anesthesia bleeding infection and the fact that is a invasive procedure with the remote possibility of a nephrectomy.  We also discussed the use of ureteroscopy which is a rigid or flexible instrument placed up into the kidney to break up stones with the laser beam and very likely a postop stent and a high likelihood of additional concomitant procedures.  Following an informed consent, he was brought to the operative suite and underwent induction of general endotracheal anesthetic.  The stone was localized at F2 and a total of 3000 shockwaves was administered without complication.  There was excellent fragmentation  He was awake and alert and returned to recovery room.       Procedure/CPT® Codes:  AL LITHOTRIPSY XTRCORP SHOCK WAVE [21604]    Procedure(s):  RIGHT EXTRACORPOREAL SHOCKWAVE LITHOTRIPSY    Surgeon(s):  Elvin Whitley MD    Anesthesia: see anesthesia record    Staff:    Circulator: Shoshana Chu RN  Scrub Person: Kimberlyn Back LPN; Christa Rubio  Vendor Representative: Brock Edgar    Estimated Blood Loss: none  Urine Voided: * No values recorded between 4/4/2025 12:43 PM and 4/4/2025  1:19 PM *    Specimens:                None      Drains: None    Findings: Excellent fragmentation    Blood: N/A    Complications: None    Grafts and Implants: None    Elvin Whitley MD     Date: 4/4/2025  Time: 13:51 EDT

## 2025-04-04 NOTE — H&P
Chief Complaint   Patient presents with    Follow-up       Surgery         HPI:   47 y.o. male.  Who presented with a left 8 mm stone emergently had a stent placement by Dr. Richardson.  This went well he was complicated by severe pain atraumatic urgently on Monday morning due to ureteroscopic removal of stones placed the stent attached to a lanyard and removed it today he is doing well we discussed avoidance of stones etc.  He has adequate pain pills he is due to go to his vacation in [place].  Other than that he is doing well     Past Medical History:         Medical History        Past Medical History:   Diagnosis Date    Elevated cholesterol      Hyperlipidemia      Hypertension      Kidney stone      PONV (postoperative nausea and vomiting)                 Current Meds:      Current Medications          Current Outpatient Medications   Medication Sig Dispense Refill    aspirin 81 MG chewable tablet Chew 81 mg Daily.        atenolol (TENORMIN) 50 MG tablet Take 1 tablet by mouth 2 (two) times a day. 180 tablet 1    cefdinir (OMNICEF) 300 MG capsule Take 1 capsule by mouth 2 (Two) Times a Day. 20 capsule 0    hydrocortisone (ANUSOL-HC) 25 MG suppository Unwrap & insert 1 suppository into the rectum 2 (Two) Times a Day As Needed for Hemorrhoids (rectal discomfort). 30 suppository 0    ibuprofen (ADVIL,MOTRIN) 800 MG tablet Take 1 tablet by mouth Every 6 (Six) Hours as needed for pain. 16 tablet 0    ketorolac (TORADOL) 10 MG tablet Take 1 tablet by mouth Every 6 (Six) Hours As Needed for Moderate Pain . 20 tablet 0    multivitamin (DAILY RAMBO) tablet tablet Take 1 tablet by mouth Daily.        oxybutynin (DITROPAN) 5 MG tablet Take 1 tablet by mouth 3 (Three) Times a Day As Needed (Bladder spasm). 24 tablet 0    oxyCODONE-acetaminophen (Percocet)  MG per tablet Take 1 tablet by mouth Every 4 (Four) Hours As Needed for Moderate Pain. 12 tablet 0    phenazopyridine (Pyridium) 200 MG tablet Take 1 tablet by  mouth 3 (Three) Times a Day As Needed for Bladder Spasms. 20 tablet 0    tamsulosin (FLOMAX) 0.4 MG capsule 24 hr capsule Take 1 capsule by mouth Daily. 30 capsule 3      No current facility-administered medications for this visit.                Facility-Administered Medications Ordered in Other Visits   Medication Dose Route Frequency Provider Last Rate Last Admin    gentamicin (GARAMYCIN) IVPB 80 mg  80 mg Intravenous Once Scar Luna MD                Allergies:       Allergies   No Known Allergies        Past Surgical History:      Surgical History         Past Surgical History:   Procedure Laterality Date    APPENDECTOMY        CHOLECYSTECTOMY        COLONOSCOPY N/A 3/16/2021     Procedure: COLONOSCOPY WITH BIOPSY;  Surgeon: Mary Cosme MD;  Location: [facility];  Service: Gastroenterology;  Laterality: N/A;    CYSTOSCOPY, RETROGRADE PYELOGRAM AND STENT INSERTION Left 6/20/2022     Procedure: CYSTOSCOPY RETROGRADE PYELOGRAM AND STENT INSERTION;  Surgeon: Scar Luna MD;  Location: [facility];  Service: Urology;  Laterality: Left;    EXTRACORPOREAL SHOCK WAVE LITHOTRIPSY (ESWL) Right 10/12/2018     Procedure: EXTRACORPOREAL SHOCKWAVE LITHOTRIPSY;  Surgeon: Elvin Whitley MD;  Location: [facility];  Service: Urology    VOCAL CORD BIOPSY                   Social History:      Social History   Social History           Socioeconomic History    Marital status:    Tobacco Use    Smoking status: Never Smoker    Smokeless tobacco: Never Used   Substance and Sexual Activity    Alcohol use: No    Drug use: No    Sexual activity: Defer            Family History:            Family History   Problem Relation Age of Onset    Hypertension Father      Arthritis Father      Heart disease Mother      Hypertension Mother      Diabetes Mother      Cancer Maternal Grandmother      Cancer Maternal Grandfather      Stroke Maternal Grandfather      Cancer Paternal Grandfather           Review of  Systems:      Review of Systems  Constitutional: Negative.    HENT: Negative.    Eyes: Negative.    Respiratory: Negative.    Cardiovascular: Negative.    Gastrointestinal: Negative.    Endocrine: Negative.    Musculoskeletal: Negative.    Allergic/Immunologic: Negative.    Neurological: Negative.    Hematological: Negative.    Psychiatric/Behavioral: Negative.          Physical Exam:      Physical Exam  Vitals and nursing note reviewed.   Constitutional:       Appearance: He is well-developed.   HENT:      Head: Normocephalic and atraumatic.   Eyes:      Conjunctiva/sclera: Conjunctivae normal.      Pupils: Pupils are equal, round, and reactive to light.   Cardiovascular:      Rate and Rhythm: Normal rate and regular rhythm.      Heart sounds: Normal heart sounds.   Pulmonary:      Effort: Pulmonary effort is normal.      Breath sounds: Normal breath sounds.   Abdominal:      General: Bowel sounds are normal.      Palpations: Abdomen is soft.   Musculoskeletal:         General: Normal range of motion.      Cervical back: Normal range of motion.   Skin:     General: Skin is warm and dry.   Neurological:      Mental Status: He is alert and oriented to person, place, and time.      Deep Tendon Reflexes: Reflexes are normal and symmetric.   Psychiatric:         Behavior: Behavior normal.         Thought Content: Thought content normal.         Judgment: Judgment normal.            I have reviewed the following portions of the patient's history: Allergies, current medications, past family history, past medical history, past social history, past surgical history, problem list, and ROS and confirm it is accurate.        Procedure:         Assessment/Plan:   Ureteral calculus-patient has been diagnosed with a ureteral calculus.  We have discussed the various parameters regarding spontaneous passage including the notion that a 2 mm stone has a high likelihood of spontaneous passage versus a larger stone being caught up in  the upper areas of the urinary tract.  We also discussed the medical management of stone disease and the use of medical expulsive therapy in the form of Flomax.  This is used in an off label setting.  We discussed the indicators for intervention including  absolute indicators such as sepsis and uncontrollable severe pain, as well as the relative indicators of moderate pain that is well-controlled with various analgesia.  I also talked about nonoperative management including ambulation and increasing fluids and hot tub as being an effective adjuncts in the treatment of a ureteral stone.  Status post successful ureteroscopic stone removal he has residual tiny pieces in the kidney he is can to push fluids.  His stent was removed via lanyard.

## 2025-04-04 NOTE — ANESTHESIA PROCEDURE NOTES
Airway  Reason: elective    Date/Time: 4/4/2025 12:46 PM  Airway not difficult    General Information and Staff    Patient location during procedure: OR  CRNA/CAA: Maryellen Remy CRNA    Indications and Patient Condition  Indications for airway management: airway protection    Preoxygenated: yes    Mask difficulty assessment: 0 - not attempted    Final Airway Details    Final airway type: supraglottic airway      Successful airway: unique  Size: 4   Number of attempts at approach: 1  Assessment: lips, teeth, and gum same as pre-op

## 2025-04-04 NOTE — ANESTHESIA PREPROCEDURE EVALUATION
Anesthesia Evaluation     Patient summary reviewed and Nursing notes reviewed   history of anesthetic complications:  PONV  NPO Solid Status: > 8 hours  NPO Liquid Status: > 8 hours           Airway   Mallampati: II  TM distance: >3 FB  Neck ROM: full  Dental - normal exam     Pulmonary - negative pulmonary ROS and normal exam   Cardiovascular   Exercise tolerance: good (4-7 METS)    Rhythm: regular  Rate: normal    (+) hypertension, hyperlipidemia      Neuro/Psych- negative ROS  GI/Hepatic/Renal/Endo    (+) obesity, GI bleeding , renal disease-    Musculoskeletal (-) negative ROS    Abdominal  - normal exam    Abdomen: soft.   Substance History - negative use     OB/GYN          Other - negative ROS                         Anesthesia Plan    ASA 2     general     intravenous induction     Anesthetic plan, risks, benefits, and alternatives have been provided, discussed and informed consent has been obtained with: patient.    Use of blood products discussed with  Consented to blood products.    Plan discussed with CRNA.        CODE STATUS:

## 2025-04-04 NOTE — ANESTHESIA POSTPROCEDURE EVALUATION
Patient: Francois Griffin    Procedure Summary       Date: 04/04/25 Room / Location: [facility] OR 09 /  [facility] OR    Anesthesia Start: 1243 Anesthesia Stop: 1319    Procedure: RIGHT EXTRACORPOREAL SHOCKWAVE LITHOTRIPSY (Right) Diagnosis:       Bilateral kidney stones      (Bilateral kidney stones [N20.0])    Surgeons: Elvin Whitley MD Provider: Maryellen Remy CRNA    Anesthesia Type: general ASA Status: 2            Anesthesia Type: general    Vitals  Vitals Value Taken Time   /90 04/04/25 13:47   Temp 97.6 °F (36.4 °C) 04/04/25 13:22   Pulse 60 04/04/25 13:49   Resp 14 04/04/25 13:47   SpO2 97 % 04/04/25 13:49   Vitals shown include unfiled device data.        Post Anesthesia Care and Evaluation    Patient location during evaluation: PHASE II  Patient participation: complete - patient participated  Level of consciousness: awake and alert  Pain score: 1  Pain management: adequate    Airway patency: patent  Anesthetic complications: No anesthetic complications  PONV Status: controlled  Cardiovascular status: acceptable  Respiratory status: acceptable  Hydration status: acceptable

## 2025-04-28 DIAGNOSIS — N40.0 BENIGN PROSTATIC HYPERPLASIA WITHOUT LOWER URINARY TRACT SYMPTOMS: ICD-10-CM

## 2025-04-29 RX ORDER — TADALAFIL 20 MG/1
20 TABLET ORAL AS NEEDED
Qty: 20 TABLET | Refills: 1 | Status: SHIPPED | OUTPATIENT
Start: 2025-04-29

## 2025-05-26 DIAGNOSIS — Z13.6 ENCOUNTER FOR LIPID SCREENING FOR CARDIOVASCULAR DISEASE: Primary | ICD-10-CM

## 2025-05-26 DIAGNOSIS — Z13.220 ENCOUNTER FOR LIPID SCREENING FOR CARDIOVASCULAR DISEASE: Primary | ICD-10-CM

## 2025-05-26 DIAGNOSIS — I10 ESSENTIAL HYPERTENSION: ICD-10-CM

## 2025-05-28 RX ORDER — ATENOLOL 50 MG/1
50 TABLET ORAL 2 TIMES DAILY
Qty: 180 TABLET | Refills: 1 | Status: SHIPPED | OUTPATIENT
Start: 2025-05-28

## 2025-05-30 ENCOUNTER — LAB (OUTPATIENT)
Dept: LAB | Facility: HOSPITAL | Age: 50
End: 2025-05-30
Payer: COMMERCIAL

## 2025-05-30 DIAGNOSIS — Z13.220 ENCOUNTER FOR LIPID SCREENING FOR CARDIOVASCULAR DISEASE: ICD-10-CM

## 2025-05-30 DIAGNOSIS — Z13.6 ENCOUNTER FOR LIPID SCREENING FOR CARDIOVASCULAR DISEASE: ICD-10-CM

## 2025-05-30 LAB
CHOLEST SERPL-MCNC: 202 MG/DL (ref 0–200)
HDLC SERPL-MCNC: 36 MG/DL (ref 40–60)
LDLC SERPL CALC-MCNC: 137 MG/DL (ref 0–100)
LDLC/HDLC SERPL: 3.71 {RATIO}
TRIGL SERPL-MCNC: 162 MG/DL (ref 0–150)
VLDLC SERPL-MCNC: 29 MG/DL (ref 5–40)

## 2025-05-30 PROCEDURE — 80061 LIPID PANEL: CPT

## 2025-05-30 PROCEDURE — 36415 COLL VENOUS BLD VENIPUNCTURE: CPT

## (undated) DEVICE — Device

## (undated) DEVICE — GW ZIPWIRE STD/SHFT STR TPR/3CM .035IN 150CM

## (undated) DEVICE — COR CYSTO: Brand: MEDLINE INDUSTRIES, INC.

## (undated) DEVICE — FIBR LASR FLEXIVAPULSE365 HOLMIUM FLT/TP 1P/U

## (undated) DEVICE — CONN Y IRR DISP 1P/U

## (undated) DEVICE — SYR LUERLOK 30CC

## (undated) DEVICE — SUCTION CANISTER, 1500CC, RIGID: Brand: DEROYAL

## (undated) DEVICE — FLEXIVA  PULSE  AND  FLEXIVA  PULSE  TRACTIP  LASER  FIBERS  ARE  HIGH  POWER  SINGLE-USE FIBER: Brand: FLEXIVA PULSE

## (undated) DEVICE — URETERAL ACCESS SHEATH SET: Brand: NAVIGATOR

## (undated) DEVICE — ENDOGATOR AUXILIARY WATER JET CONNECTOR: Brand: ENDOGATOR

## (undated) DEVICE — TUBING, SUCTION, 1/4" X 20', STRAIGHT: Brand: MEDLINE INDUSTRIES, INC.

## (undated) DEVICE — GOWN,REINF,POLY,ECL,PP SLV,XL: Brand: MEDLINE

## (undated) DEVICE — SINGLE PORT MANIFOLD: Brand: NEPTUNE 2

## (undated) DEVICE — Device: Brand: DEFENDO AIR/WATER/SUCTION AND BIOPSY VALVE

## (undated) DEVICE — CATH URETRL FLXITP POLLACK STD 5F 70CM